# Patient Record
Sex: MALE | Race: WHITE
[De-identification: names, ages, dates, MRNs, and addresses within clinical notes are randomized per-mention and may not be internally consistent; named-entity substitution may affect disease eponyms.]

---

## 2017-02-15 ENCOUNTER — HOSPITAL ENCOUNTER (INPATIENT)
Dept: HOSPITAL 53 - M ED | Age: 77
LOS: 18 days | Discharge: HOME HEALTH | DRG: 25 | End: 2017-03-05
Attending: INTERNAL MEDICINE | Admitting: INTERNAL MEDICINE
Payer: MEDICARE

## 2017-02-15 VITALS — SYSTOLIC BLOOD PRESSURE: 121 MMHG | DIASTOLIC BLOOD PRESSURE: 66 MMHG

## 2017-02-15 VITALS — DIASTOLIC BLOOD PRESSURE: 65 MMHG | SYSTOLIC BLOOD PRESSURE: 133 MMHG

## 2017-02-15 VITALS — HEIGHT: 70 IN | BODY MASS INDEX: 26.29 KG/M2 | WEIGHT: 183.65 LBS

## 2017-02-15 VITALS — DIASTOLIC BLOOD PRESSURE: 84 MMHG | SYSTOLIC BLOOD PRESSURE: 175 MMHG

## 2017-02-15 DIAGNOSIS — I95.9: ICD-10-CM

## 2017-02-15 DIAGNOSIS — R26.9: ICD-10-CM

## 2017-02-15 DIAGNOSIS — R32: ICD-10-CM

## 2017-02-15 DIAGNOSIS — Z79.84: ICD-10-CM

## 2017-02-15 DIAGNOSIS — F02.80: ICD-10-CM

## 2017-02-15 DIAGNOSIS — E55.9: ICD-10-CM

## 2017-02-15 DIAGNOSIS — M47.892: ICD-10-CM

## 2017-02-15 DIAGNOSIS — E86.0: ICD-10-CM

## 2017-02-15 DIAGNOSIS — E78.00: ICD-10-CM

## 2017-02-15 DIAGNOSIS — Z79.899: ICD-10-CM

## 2017-02-15 DIAGNOSIS — E11.42: ICD-10-CM

## 2017-02-15 DIAGNOSIS — N17.9: ICD-10-CM

## 2017-02-15 DIAGNOSIS — Z87.891: ICD-10-CM

## 2017-02-15 DIAGNOSIS — G93.5: ICD-10-CM

## 2017-02-15 DIAGNOSIS — G30.9: ICD-10-CM

## 2017-02-15 DIAGNOSIS — I10: ICD-10-CM

## 2017-02-15 DIAGNOSIS — E78.5: ICD-10-CM

## 2017-02-15 DIAGNOSIS — G93.0: Primary | ICD-10-CM

## 2017-02-15 DIAGNOSIS — M47.896: ICD-10-CM

## 2017-02-15 LAB
ALBUMIN SERPL BCG-MCNC: 3.8 GM/DL (ref 3.2–5.2)
ALBUMIN/GLOB SERPL: 1.03 {RATIO} (ref 1–1.93)
ALP SERPL-CCNC: 84 U/L (ref 45–117)
ALT SERPL W P-5'-P-CCNC: 30 U/L (ref 12–78)
ANION GAP SERPL CALC-SCNC: 6 MEQ/L (ref 8–16)
AST SERPL-CCNC: 23 U/L (ref 15–37)
BASOPHILS # BLD AUTO: 0 K/MM3 (ref 0–0.2)
BASOPHILS NFR BLD AUTO: 0.4 % (ref 0–1)
BILIRUB CONJ SERPL-MCNC: 0.1 MG/DL (ref 0–0.2)
BILIRUB SERPL-MCNC: 0.7 MG/DL (ref 0.2–1)
BUN SERPL-MCNC: 50 MG/DL (ref 7–18)
CALCIUM SERPL-MCNC: 9 MG/DL (ref 8.8–10.2)
CHLORIDE SERPL-SCNC: 101 MEQ/L (ref 98–107)
CO2 SERPL-SCNC: 33 MEQ/L (ref 21–32)
CREAT SERPL-MCNC: 1.58 MG/DL (ref 0.7–1.3)
EOSINOPHIL # BLD AUTO: 0.2 K/MM3 (ref 0–0.5)
EOSINOPHIL NFR BLD AUTO: 2.7 % (ref 0–3)
ERYTHROCYTE [DISTWIDTH] IN BLOOD BY AUTOMATED COUNT: 12.6 % (ref 11.5–14.5)
EST. AVERAGE GLUCOSE BLD GHB EST-MCNC: 134 MG/DL (ref 60–110)
GFR SERPL CREATININE-BSD FRML MDRD: 45.7 ML/MIN/{1.73_M2} (ref 42–?)
GLUCOSE SERPL-MCNC: 120 MG/DL (ref 83–110)
INR PPP: 0.95
LARGE UNSTAINED CELL #: 0.1 K/MM3 (ref 0–0.4)
LARGE UNSTAINED CELL %: 1 % (ref 0–4)
LYMPHOCYTES # BLD AUTO: 1.4 K/MM3 (ref 1.5–4.5)
LYMPHOCYTES NFR BLD AUTO: 18.3 % (ref 24–44)
MCH RBC QN AUTO: 28.9 PG (ref 27–33)
MCHC RBC AUTO-ENTMCNC: 33.6 G/DL (ref 32–36.5)
MCV RBC AUTO: 85.9 FL (ref 80–96)
MONOCYTES # BLD AUTO: 0.4 K/MM3 (ref 0–0.8)
MONOCYTES NFR BLD AUTO: 5.7 % (ref 0–5)
NEUTROPHILS # BLD AUTO: 5.2 K/MM3 (ref 1.8–7.7)
NEUTROPHILS NFR BLD AUTO: 72 % (ref 36–66)
PLATELET # BLD AUTO: 232 K/MM3 (ref 150–450)
POTASSIUM SERPL-SCNC: 4.4 MEQ/L (ref 3.5–5.1)
PROT SERPL-MCNC: 7.5 GM/DL (ref 6.4–8.2)
SODIUM SERPL-SCNC: 140 MEQ/L (ref 136–145)
T4 FREE SERPL-MCNC: 1.04 NG/DL (ref 0.76–1.46)
WBC # BLD AUTO: 7.2 K/MM3 (ref 4–10)

## 2017-02-15 RX ADMIN — SODIUM CHLORIDE SCH MLS/HR: 9 INJECTION, SOLUTION INTRAVENOUS at 18:20

## 2017-02-15 RX ADMIN — INSULIN LISPRO SCH UNITS: 100 INJECTION, SOLUTION INTRAVENOUS; SUBCUTANEOUS at 17:30

## 2017-02-15 RX ADMIN — DOCUSATE SODIUM SCH MG: 100 CAPSULE, LIQUID FILLED ORAL at 21:04

## 2017-02-15 RX ADMIN — SIMVASTATIN SCH MG: 20 TABLET, FILM COATED ORAL at 21:04

## 2017-02-15 RX ADMIN — INSULIN LISPRO SCH UNITS: 100 INJECTION, SOLUTION INTRAVENOUS; SUBCUTANEOUS at 20:51

## 2017-02-15 NOTE — HPE
DATE OF ADMISSION:  02/15/2017

 

PRIMARY CARE PROVIDER:  Dr. Ryan Gleason MD

 

HISTORY OF PRESENT ILLNESS:  This patient is a 76-year-old male with a past

medical history significant for hypertension, hypercholesterolemia, diabetes,

dementia, presented to [facility] on 02/15/2017, after a fall.

Patient was visiting his wife in [facility] and patient started

feeling weak and fell on the floor and patient was brought into [facility] for further evaluation.  Information was also obtained from the two

daughters present during encounter.  Patient started having multiple falls in the

past month, at least three witnessed falls so far, and on 02/08/2017, patient

fell and landed on the right ribs and resulted in rib fractures.  Patient does

not remember any specific triggers for the fall.  He said that he just started to

have sudden weakness.  Denied any lightheadedness.  Denies any loss of

consciousness.  Denies any numbness or tingling.  Per family members, patient

does not have a diagnosis of dementia, however they have noticed patient

demonstrating some slowing of memory issues.  Patient lives with older daughter.

 

When patient arrived in the emergency room, patient had a blood pressure of

91/50.  Patient also was found to have a heart rate of 49.  Gentle intravenous

(IV) fluids given which temporarily increased the blood pressure.  Atropine 0.5

mg IV times one was given, however patient's heart rate maintained around 50s and

hospitalist team was called for admission.

 

ALLERGIES:  No known drug allergies.

 

HOME MEDICATIONS:

- metformin 500 mg by mouth twice a day

- lisinopril 10 mg by mouth daily

- hydrochlorothiazide 6.25 mg daily

- simvastatin 20 mg by mouth daily

- Namenda 28 mg by mouth daily

 

PAST MEDICAL HISTORY:

1.  Hypertension.

2.  Hypercholesterolemia.

3.  Dementia.

4.  Diabetes.

 

PAST SURGICAL HISTORY:  Right knee surgery.

 

SOCIAL HISTORY:  Patient smoked one pack daily for approximately 25 years, quit

11 years ago.  Drinks 1-2 beers every week.  Denies any recreational drug use.

Patient is a [employer].

 

REVIEW OF SYSTEMS:

GENERAL:  No fever, no chills.

HEENT:  No vision changes, no auditory changes.

CARDIOVASCULAR:  No chest pain, no palpitations.

RESPIRATORY:  No shortness of breath, no cough, no sputum production.

GASTROINTESTINAL (GI):  No abdominal pain, no nausea, no vomiting, no diarrhea.

GENITOURINARY ():  Denies any dysuria or hematuria.

 

OBJECTIVE:

VITAL SIGNS:  Blood pressure 95/51, pulse 48, respirations 16, temperature 97.4,

pulse oximetry 97% in room air.  Body weight is 83.91 kg, body height 175.26 cm.

GENERAL:  Fatigued, alert and awake, oriented to name, place, and the president,

however patient demonstrates some signs of difficulty with long-term memory.

HEENT:  Normocephalic, atraumatic.  Extraocular motors grossly intact.

CARDIOVASCULAR:  Distant heart sounds, positive S1, S2, but bradycardic with

heart rate wandering around 50s.

RESPIRATORY:  Clear to auscultation bilaterally.  No wheezes or rhonchi.

ABDOMEN:  Soft, nontender, nondistended.  Bowel sounds present.  No rebound, no

guarding.

EXTREMITIES:  No edema.  No sign of cyanosis.

NEUROLOGICAL:  Sensation to fine touch grossly intact.  Muscle strength 5/5.

 

LABORATORY DATA:

WBC 7.2, hemoglobin 14, hematocrit 41.5, platelet count 232.

 

Sodium 140, potassium 4.4, chloride 101, carbon dioxide 33, BUN 50, creatinine

1.58, GFR 45.7, fasting glucose 120, calcium 9, total bilirubin 0.7, direct

bilirubin 0.1, AST 23, ALT 30, alkaline phosphatase 83, total , troponin I

less than 0.02, total protein 7.5, albumin 3.8, TSH 0.315.  PT 0.8, INR 0.95.

 

CT of the head without contrast shows small vessel ischemic disease.  Mild volume

loss.  4.1 cm cystic lesion in the right frontal and parietal lobe.

 

ASSESSMENT AND PLAN:

1.  Frequent falls.  Patient will be admitted to the progressive care unit (PCU)

under observation status.  Patient does have bradycardia with a heart rate

wandering around 50s.  We will obtain outpatient records to see patient's

baseline heart rate.  We will also try to rule out any possible cardiac

arrhythmia.  Follow with orthostatic vital signs measurements.  Follow with

physical therapy evaluation and treatment.

 

2.  Acute kidney injury.  We do not know if patient has baseline chronic kidney

disease.  At this moment, patient has a BUN of 50, creatinine 1.58.  Patient was

started on gentle hydration which will also help with patient's hypotension.

 

3.  Hypotension.  Responds briefly with 0.5 liters bolus times two.  Currently

patient will be on continuous gentle hydration.

 

4.  Cyst lesion in the right frontal and parietal lobe.  The case has been

discussed with Dr. Patel.  Patient will have an MRI of the brain without

contrast followed by with contrast.

 

5.  Diabetes.  Follow with A1c.  Continue with consistent carbohydrate diet.

Continue with insulin sliding scale.

 

6.  History of hypercholesterolemia.  Continue statin.

 

7.  History of hypertension.  Currently patient is hypotensive.  Will hold the

blood pressure medications.

 

8.  Dementia.  Continue Namenda.

 

9.  Elevated thyroid stimulating hormone (TSH).  Will follow with free T4.

Patient does not have a history of thyroid disease.

 

10.  Deep venous thrombosis (DVT) prophylaxis.  Due to frequent falls, currently

patient will be on thromboembolism deterrents (TEDs) and sequential compression

device.

## 2017-02-15 NOTE — ECGEPIP
Stationary ECG Study

                           [facility] - ED

                                       

                                       Test Date:    2017-02-15

Pat Name:     CHET OSWALD          Department:   

Patient ID:   [id number]                 Room:         -

Gender:       M                        Technician:   THEE

:          1940               Requested By: MARIUSZ SCOTT

Order Number: [id number]     Reading MD:   Geronimo Sue

                                 Measurements

Intervals                              Axis          

Rate:         46                       P:            35

MI:           189                      QRS:          1

QRSD:         105                      T:            15

QT:           431                                    

QTc:          381                                    

                           Interpretive Statements

SINUS BRADYCARDIA

MODERATE VOLTAGE CRITERIA FOR LVH, CONSIDER NORMAL VARIANT

ANTEROLATERAL ST ELEVATION, INFERIOR ST DEPRESSION CONSIDER ACUTE INJURY

Electronically Signed On 2- 12:36:52 EST by Geronimo Sue

## 2017-02-15 NOTE — EDDOCDS
Nurse's Notes                                                                                     

[facility]                                                                         

Name: Chet Mayfield                                                                             

Age: 76 yrs                                                                                       

Sex: Male                                                                                         

: 1940                                                                                   

MRN: [id number]                                                                                     

Arrival Date: 02/15/2017                                                                          

Time: 11:33                                                                                       

Account#: [id number]                                                                              

Bed Admit Hold                                                                                    

Private MD: Ryan Gleason M.D.                                                                    

Diagnosis: Gastrointestinal hemorrhage, unspecified;Hypotension;Bradycardia, unspecified;Abrasion,

left knee;Abnormal brain scan-likely arachnoid cyst                                             

                                                                                                  

Presentation:                                                                                     

02/15                                                                                             

11:40 Presenting complaint: EMS states: states was up visiting wife at [facility], became disorient  ml6 

      and fell x 3. Adult Sepsis Screening: Patient has new or worsening altered mentation (1     

      point). Patient's respiratory rate is less than 22. Systolic blood pressure is less         

      than or equal to 100 (1 point). Patient has a qSOFA score of 2. No known or suspected       

      infection- Negative Sepsis Screen. Suicide/Homicide risk assessment- the patient denies     

      having any suicidal and/or homicidal ideations and does not present with any other          

      emotional, behavioral or mental health complaints.  Status: Patient is not a        

       or  dependent. Transition of care: patient was not received from     

      another setting of care.                                                                    

11:40 Acuity: NILESH Level 2                                                                     ml6 

11:40 Method Of Arrival: Ambulance                                                            ml6 

                                                                                                  

Triage Assessment:                                                                                

11:40 General: Appears in no apparent distress, Behavior is appropriate for age, cooperative. ml6 

      Pain: Denies pain. Neurological: No deficits noted. Level of Consciousness is awake,        

      alert, Oriented to person, place, time. Cardiovascular: No deficits noted. Capillary        

      refill < 3 seconds is brisk in bilateral fingers toes. Respiratory: No deficits noted.      

      GI: No deficits noted. Musculoskeletal: Circulation, motion, and sensation intact           

      Capillary refill < 3 seconds is brisk in bilateral fingers toes Range of motion intact      

      in all extremities. No deformity noted Swelling absent Signs and Symptoms of                

      Compartment Syndrome: no signs of compartment syndrome.                                     

                                                                                                  

Historical:                                                                                       

- Allergies: no known allergies;                                                                  

- Home Meds:                                                                                      

1. metformin 500 mg Oral tab 1 tab 2 times per day                                              

     (Last dose: Unknown)                                                                         

2. lisinopril 10 mg Oral tab 1 tab once daily                                                   

     (Last dose: Unknown)                                                                         

3. hydrochlorothiazide 12.5 mg Oral cap 6.25 mg once daily                                      

     (Last dose: Unknown)                                                                         

4. simvastatin 20 mg Oral tab 1 tab once daily                                                  

     (Last dose: Unknown)                                                                         

5. Namenda XR 28 mg oral CSpX 1 cap once daily                                                  

     (Last dose: Unknown)                                                                         

- PMHx: Hypertension; Hypercholesterolemia; Dementia;                                             

- PSHx: Knee surgery- Right;                                                                      

- Social history: Smoking status: Patient states former smoker of tobacco. No barriers            

to communication noted.                                                                         

- : The pt / caregiver states he / she is not on anticoagulants. Home medication list             

is obtained from patients' pharmacy.                                                            

- Exposure Risk Screening:: None identified.                                                      

                                                                                                  

                                                                                                  

Screenin:47 Screening information is obtained from the patient. Fall risk: At risk due to apparent  ml6 

      cognitive impairment, prior history of falls. Assistance ADL's: requires no assistance      

      with activities of daily living. Abuse/DV Screen: The patient / caregiver reports           

      he/she is: not in a situation that causes fear, pain or injury. Nutritional screening:      

      No deficits noted. Advance Directives: Currently, there is no health care proxy. home       

      support is adequate.                                                                        

                                                                                                  

Assessment:                                                                                       

11:40 General: Appears in no apparent distress, see triage assessment.                        ml6 

12:40 General: Appears in no apparent distress, comfortable, Behavior is appropriate for age, ml6 

      cooperative. Pain: Denies pain. Neurological: Level of Consciousness is awake, alert,       

      Oriented to person, place, time,  are equal bilaterally Moves all extremities.         

      Full function Gait is steady, Speech with expressive aphasia noted, Facial symmetry         

      appears normal, Pupils are PERRLA, per daughter patient normally has expressive aphasia     

      .                                                                                           

13:40 Reassessment: Patient appears in no apparent distress at this time. Patient denies pain ml6 

      at this time. Patient states feeling better. Patient states symptoms have improved.         

      patient states that fatigue and weakness have improved after IVF.                           

14:40 Reassessment: Patient appears in no apparent distress at this time. Patient denies pain ml6 

      at this time. Patient states feeling better. Patient states symptoms have improved. no      

      change from previous assessment.                                                            

15:40 General: Appears in no apparent distress, comfortable, Behavior is appropriate for age, ml6 

      cooperative. Pain: Denies pain. Neurological: No deficits noted. Level of Consciousness     

      is awake, alert, Oriented to person, place, time,  are equal bilaterally Moves all     

      extremities. Full function Gait is steady, Speech with expressive aphasia noted.            

      Cardiovascular: No deficits noted. Capillary refill < 3 seconds is brisk in bilateral       

      fingers toes. Respiratory: No deficits noted. GI: No deficits noted.                        

16:29 General: patient transported to MRI.                                                    ml6 

                                                                                                  

Vital Signs:                                                                                      

11:45 BP 92 / 53 (auto/);                                                                     ml6 

11:45 Pulse 54 MON; Resp 16; Temp 97.4(O); Pulse Ox 98% on R/A; Weight 83.91 kg (R); Height 5 ml6 

      ft. 9 in. (175.26 cm) (R); Pain 0/10;                                                       

12:01 BP 91 / 50 (auto/);                                                                     ml6 

12:01 Pulse 50 MON; Resp 16; Pulse Ox 99% on R/A;                                             ml6 

12:16 BP 99 / 73 (auto/);                                                                     ml6 

12:16 Pulse 57 MON; Resp 16; Pulse Ox 97% on R/A;                                             ml6 

12:23 BP 92 / 54 (auto/);                                                                     ml6 

12:23 Pulse 49 MON; Resp 16; Pulse Ox 98% on R/A; Pain 0/10;                                  ml6 

12:28  / 68 (auto/);                                                                    ml6 

12:28 Pulse 51; Resp 16; Pulse Ox 94% on R/A;                                                 ml6 

12:33  / 57 (auto/);                                                                    ml6 

12:33 Pulse 50; Resp 16; Pulse Ox 98% on R/A;                                                 ml6 

12:38 BP 95 / 51 (auto/);                                                                     ml6 

12:38 Pulse 48; Resp 16; Pulse Ox 97% on R/A;                                                 ml6 

12:58 BP 85 / 53 (auto/);                                                                     ml6 

12:58 Pulse 51 MON; Resp 16; Pulse Ox 97% on R/A;                                             ml6 

13:03 BP 85 / 52 (auto/);                                                                     ml6 

13:03 Pulse 50 MON; Resp 16; Pulse Ox 96% on R/A;                                             ml6 

13:08 BP 88 / 53 (auto/);                                                                     ml6 

13:08 Pulse 50 MON; Resp 16; Pulse Ox 98% on R/A;                                             ml6 

13:13 BP 86 / 52 (auto/);                                                                     ml6 

13:13 Pulse 50 MON; Resp 16; Pulse Ox 98% on R/A; Pain 0/10;                                  ml6 

13:18 BP 74 / 45 (auto/);                                                                     ml6 

13:18 Pulse 52 MON; Resp 16; Pulse Ox 98% on R/A; Pain 0/10;                                  ml6 

13:23 BP 73 / 43 (auto/);                                                                     ml6 

13:23 Pulse 50 MON; Resp 16; Pulse Ox 97% on R/A;                                             ml6 

13:28 BP 73 / 40 (auto/);                                                                     ml6 

13:28 Pulse 50 MON; Resp 16; Pulse Ox 94% on R/A; Pain 0/10;                                  ml6 

13:38 BP 81 / 45 (auto/);                                                                     ml6 

13:38 Pulse 52 MON; Resp 16; Pulse Ox 97% on R/A;                                             ml6 

13:48  / 57 (auto/);                                                                    ml6 

13:48 Pulse 61 MON; Resp 16; Pulse Ox 98% on R/A; Pain 0/10;                                  ml6 

14:30  / 62 (auto/);                                                                    ml6 

16:29  / 65 (auto/);                                                                    sls1

16:31 Pulse 60 MON; Pulse Ox 97% ;                                                            sls1

20:28  / 56 (auto/);                                                                    sls1

20:30 Pulse 52 MON; Pulse Ox 93% ;                                                            sls1

11:45 Body Mass Index 27.32 (83.91 kg, 175.26 cm)                                             ml6 

                                                                                                  

Vitals:                                                                                           

11:51 Log In Time N/A - ambulance arrival.                                                    ml6 

                                                                                                  

ED Course:                                                                                        

11:34 Patient visited by Veronica Piedra, Unit Clerk.                                             lbd 

11:34 Patient moved to Waiting                                                                lbd 

11:35 Ryan Gleason is Private Physician.                                                      lbd 

11:35 Patient moved to 3                                                                      lbd 

11:41 Triage Initiated                                                                        ml6 

11:47 The patient / caregiver is instructed regarding the plan of care and ED course. Cardiac ml6 

      monitor on. Pulse ox on. NIBP on.                                                           

11:47 Inserted peripheral IV: 16gauge IV in left antecubital area and blood collected.        ml6 

      Patient tolerated the procedure well. Inserted peripheral IV: 18gauge IV in right           

      antecubital area and blood collected. Patient tolerated the procedure well. No              

      procedures done that require assistance. Labs drawn. (by ED staff). Sent per order to       

      lab.                                                                                        

11:49 Patient visited by Archie Duron.                                                       jml1

11:49 EKG done. (by ED staff). Reviewed by Antelmo Robbins MD.                                   jml1

12:04 Antelmo Robbins MD is Attending Physician.                                               br1 

12:17 Patient visited by Antelmo Robbins MD.                                                   br1 

12:47 EKG-ADULT Returned.                                                                     EDMS

12:49 Patient name changed from Chet\S\\S\Mayfield\S\ to Chet\S\T\S\Mayfield.                    
   EDMS

12:51 Patient visited by Lam Gallegos RN.                                                   ml6 

12:51 [facility] Payment Agreement was scanned into [employer] and attached to record.               lg  

12:51 Chest, 1 View Returned.                                                                 EDMS

13:14 Patient visited by Lam Gallegos RN.                                                   ml6 

13:34 CT Head Without Contrast Returned.                                                      EDMS

13:45 Patient visited by Lam Gallegos RN.                                                   ml6 

13:47 Patient moved to CT                                                                     ml6 

13:48 Patient moved to 3                                                                      ek2 

14:05 Tierney Barrientos .                                                                 ys2 

14:06 Patient visited by Antelmo Robbins MD.                                                   br1 

14:12 Tierney Barrientos is Hospitalizing Provider.                                                     br1 

15:12 T-Sheet-- Draft Copy was scanned into [employer] and attached to record.                   gb  

15:48 Knee, Complete Returned.                                                                EDMS

16:28 Patient moved to 18                                                                     ml6 

19:06 CT Head Without Contrast Returned.                                                      EDMS

19:07 MRI Brain W/O FOLL BY WITH Returned.                                                    EDMS

19:34 Patient moved to Admit Hold                                                             sls1

                                                                                                  

Administered Medications:                                                                         

12:19 Drug: NS 0.9% 500 ml [sodium chloride 0.9 % intravenous solution] Route: IV; Rate:      ml6 

      bolus; Site: right forearm;                                                                 

13:15 Follow up: IV Status: Completed infusion; Infusion discontinued; IV Intake: 500ml       ml6 

12:27 Drug: Atropine 0.5 mg [atropine 0.1 mg/mL injection syringe (5 mL)] Route: IVP; Site:   ml6 

      right antecubital;                                                                          

13:42 Drug: NS 0.9% 500 ml [sodium chloride 0.9 % intravenous solution] Route: IV; Rate:      ml6 

      bolus; Site: right antecubital;                                                             

16:35 Follow up: IV Status: Completed infusion; Infusion discontinued; IV Intake: 500ml       ml6 

                                                                                                  

                                                                                                  

Intake:                                                                                           

13:15 IV: 500.00ml; Total: 500.00ml.                                                          ml6 

16:35 IV: 500.00ml; Total: 1000.00ml.                                                         ml6 

                                                                                                  

Order Results:                                                                                    

Lab Order: CBC with Diff; SPEC'M 02/15/17 11:46                                                   

      Test: WHITE BLOOD COUNT; Value: 7.2; Range: 4.0-10.0; Units: K/mm3; Status: F               

      Test: RED BLOOD COUNT; Value: 4.83; Range: 4.30-6.10; Units: M/mm3; Status: F               

      Test: HEMOGLOBIN; Value: 14.0; Range: 14.0-18.0; Units: g/dl; Status: F                     

      Test: HEMATOCRIT; Value: 41.5; Range: 42.0-52.0; Abnormal: Below low normal; Units: %;      

      Status: F                                                                                   

      Test: MEAN CORPUSCULAR VOLUME; Value: 85.9; Range: 80.0-96.0; Units: fl; Status: F          

      Test: MEAN CORPUSCULAR HEMOGLOBIN; Value: 28.9; Range: 27.0-33.0; Units: pg; Status: F      

      Test: MEAN CORPUSCULAR HGB CONC; Value: 33.6; Range: 32.0-36.5; Units: g/dl; Status: F      

      Test: RED CELL DISTRIBUTION WIDTH; Value: 12.6; Range: 11.5-14.5; Units: %; Status: F       

      Test: PLATELET COUNT, AUTOMATED; Value: 232; Range: 150-450; Units: k/mm3; Status: F        

      Test: NEUTROPHILS %; Value: 72.0; Range: 36.0-66.0; Abnormal: Above high normal; Units:     

      %; Status: F                                                                                

      Test: LYMPH %; Value: 18.3; Range: 24.0-44.0; Abnormal: Below low normal; Units: %;         

      Status: F                                                                                   

      Test: MONO %; Value: 5.7; Range: 0.0-5.0; Abnormal: Above high normal; Units: %;            

      Status: F                                                                                   

      Test: EOS %; Value: 2.7; Range: 0.0-3.0; Units: %; Status: F                                

      Test: BASO %; Value: 0.4; Range: 0.0-1.0; Units: %; Status: F                               

      Test: LARGE UNSTAINED CELL %; Value: 1.0; Range: 0.0-4.0; Units: %; Status: F               

      Test: NEUTROPHILS #; Value: 5.2; Range: 1.8-7.7; Units: K/mm3; Status: F                    

      Test: LYMPH #; Value: 1.4; Range: 1.5-4.5; Abnormal: Below low normal; Units: K/mm3;        

      Status: F                                                                                   

      Test: MONO #; Value: 0.4; Range: 0.0-0.8; Units: K/mm3; Status: F                           

      Test: EOS #; Value: 0.2; Range: 0.0-0.50; Units: K/mm3; Status: F                           

      Test: BASO #; Value: 0.0; Range: 0.0-0.2; Units: K/mm3; Status: F                           

      Test: LARGE UNSTAINED CELL #; Value: 0.1; Range: 0.0-0.4; Units: K/mm3; Status: F           

Lab Order: Cardiac Injury Profile; SPEC'M 02/15/17 11:46                                          

      Test: CPK CREATINE PHOSPHOKINASE; Value: 208; Range: ; Units: U/L; Status: F          

      Test: CK-MB VALUE MASS; Value: 4.3; Range: 0.0-3.6; Abnormal: Above high normal; Units:     

      NG/ML; Status: F                                                                            

      Test: MB/CK RELATIVE INDEX; Value: 2.06; Range: < OR =4; Status: F                          

      Test Note: &nbsp;; DIAGNOSIS CRITERIA MMB ng/ml Relative Index (RI) NON-AMI < or = 5      

      N/A GRAY ZONE > 5 < or = 4 AMI > 5 > 4                                                      

Lab Order: Liver Profile; SPEC'M 02/15/17 11:46                                                   

      Test: AST/SGOT; Value: 23; Range: 15-37; Units: U/L; Status: F                              

      Test: ALT/SGPT; Value: 30; Range: 12-78; Units: U/L; Status: F                              

      Test: ALKALINE PHOSPHATASE; Value: 84; Range: ; Units: U/L; Status: F                 

      Test: BILIRUBIN,TOTAL; Value: 0.7; Range: 0.2-1.0; Units: MG/DL; Status: F                  

      Test: BILIRUBIN,DIRECT; Value: 0.1; Range: 0.0-0.2; Units: MG/DL; Status: F                 

      Test: TOTAL PROTEIN; Value: 7.5; Range: 6.4-8.2; Units: GM/DL; Status: F                    

      Test: ALBUMIN; Value: 3.8; Range: 3.2-5.2; Units: GM/DL; Status: F                          

      Test: ALBUMIN/GLOBULIN RATIO; Value: 1.03; Range: 1.00-1.93; Status: F                      

Lab Order: MED Profile; SPEC'M 02/15/17 11:46                                                     

      Test: GLUCOSE, FASTING; Value: 120; Range: ; Abnormal: Above high normal; Units:      

      MG/DL; Status: F                                                                            

      Test: BLOOD UREA NITROGEN; Value: 50; Range: 7-18; Abnormal: Above high normal; Units:      

      MG/DL; Status: F                                                                            

      Test: CREATININE FOR GFR; Value: 1.58; Range: 0.70-1.30; Abnormal: Above high normal;       

      Units: MG/DL; Status: F                                                                     

      Test: GLOMERULAR FILTRATION RATE; Value: 45.7; Range: >42; Status: F                        

      Test: SODIUM LEVEL; Value: 140; Range: 136-145; Units: MEQ/L; Status: F                     

      Test: POTASSIUM SERUM; Value: 4.4; Range: 3.5-5.1; Units: MEQ/L; Status: F                  

      Test: CHLORIDE LEVEL; Value: 101; Range: ; Units: MEQ/L; Status: F                    

      Test: CARBON DIOXIDE LEVEL; Value: 33; Range: 21-32; Abnormal: Above high normal;           

      Units: MEQ/L; Status: F                                                                     

      Test: ANION GAP; Value: 6; Range: 8-16; Abnormal: Below low normal; Units: MEQ/L;           

      Status: F                                                                                   

      Test: CALCIUM LEVEL; Value: 9.0; Range: 8.8-10.2; Units: MG/DL; Status: F                   

      Test Note: &nbsp;; Units are mL/min/1.73 m2 Chronic Kidney Disease Staging per NKF:       

      Stage I & II GFR >=60 Normal to Mildly Decreased Stage III GFR 30-59 Moderately           

      Decreased Stage IV GFR 15-29 Severely Decreased Stage V GFR <15 Very Little GFR Left        

      ESRD GFR <15 on RRT                                                                         

Lab Order: Thyroid Stimulating Hormone; SPEC'M 02/15/17 11:46                                     

      Test: THYROID STIMULATING HORMONE; Value: 0.315; Range: 0.358-3.740; Abnormal: Below        

      low normal; Units: uIU/ML; Status: F                                                        

Lab Order: Troponin; SPEC'M 02/15/17 11:46                                                        

      Test: TROPONIN I; Value: < 0.02; Range: < 0.10; Units: NG/ML; Status: F                     

      Test Note: &nbsp;; Troponin I Reference Interval for [employer] [place] LOCI: 99th             

      Percentile= 0.00-0.045 ng/ml Risk Stratification: <= 0.10 ng/ml Decreased Risk for          

      Adverse Clinical Events. 0.10-1.50 ng/ml Increased Risk for Adverse Clinical Events.        

      Evaluation of additional criterion and/or repeat testing in 2-6 hours is suggested to       

      rule out myocardial damage. >= 1.50 ng/ml Indicative of Myocardial Injury.                  

Lab Order: Type & Screen; SPEC'M 02/15/17 11:46                                                 

      Test: BLOOD TYPE; Value: O POS; Status: F                                                   

      Test: AB SCREEN (INDIRECT SHAD)VIS; Value: NEGATIVE; Status: F                            

Lab Order: PT/INR; SPEC'M 02/15/17 11:46                                                          

      Test: PROTHROMBIN TIME; Value: 12.8; Range: 12.3-14.5; Units: SECONDS; Status: F            

      Test: INR; Value: 0.95; Status: F                                                           

      Test Note: &nbsp;; THERAPUTIC HUMAN INR VALUES INDICATIONS NORMAL RANGES                  

      PROPHYLAXIS/TREATMENT OF: VENOUS THROMBOSIS 2.0-3.0 PULMONARY EMBOLISM 2.0-3.0              

      PREVENTION OF SYSTEMIC EMBOLISM FROM: TISSUE HEART VALVES 2.0-3.0 ACUTE MYOCARDIAL          

      INFARCTION 2.0-3.0 VALVULAR HEART DISEASE 2.0-3.0 ATRIAL FIBRILLATION 2.0-3.0               

      MECHANICAL VALVES(HIGH RISK) 2.5-3.5 RECURRENT MYOCARDIAL INFARCTION 2.5-3.5                

Lab Order: PTT; SPEC 02/15/17 11:46                                                             

      Test: PARTIAL THROMBOPLASTIN TIME; Value: 27.6; Range: 26.6-37.1; Units: SECONDS;           

      Status: F                                                                                   

Lab Order: FREE T4; SPEC 02/15/17 11:46                                                         

      Test: FREE T4; Value: 1.04; Range: 0.76-1.46; Units: NG/DL; Status: F                       

Lab Order: HEMOGLOBIN A1C; SPEC 02/15/17 11:46                                                  

      Test: HEMOGLOBIN A1c; Value: 6.3; Range: 4.5-6.2; Abnormal: Above high normal; Units:       

      %; Status: F                                                                                

      Test: ESTIMATED AVERAGE GLUCOSE; Value: 134; Range: ; Abnormal: Above high            

      normal; Units: MG/DL; Status: F                                                             

Lab Order: Fingerstick Blood Sugar; SPEC'M 02/15/17 18:07                                         

      Test: BEDSIDE GLUCOSE; Value: 97; Range: ; Units: MG/DL; Status: F                    

Lab Order: Fingerstick Blood Sugar; SPEC'M 02/15/17 20:27                                         

      Test: BEDSIDE GLUCOSE; Value: 141; Range: ; Abnormal: Above high normal; Units:       

      MG/DL; Status: F                                                                            

                                                                                                  

Radiology Order: EKG-ADULT                                                                        

      Test: EKG-ADULT                                                                             

      REASON FOR EXAMINATION: hypotension; Stationary ECG Study; [facility] - ED; ;       

      Test Date: 2017-02-15; Pat Name: CHET MAYFIELD Department:; Patient ID: [id number]          

      Room: -; Gender: M Technician: JT; : 1940 Requested By: ANTELMO SCOTT; Order     

      Number: [id number] Reading MD: Geronimo Sue; Measurements; Intervals Axis;        

      Rate: 46 P: 35; MO: 189 QRS: 1; QRSD: 105 T: 15; QT: 431; QTc: 381; Interpretive            

      Statements; SINUS BRADYCARDIA; MODERATE VOLTAGE CRITERIA FOR LVH, CONSIDER NORMAL           

      VARIANT; ANTEROLATERAL ST ELEVATION, INFERIOR ST DEPRESSION CONSIDER ACUTE INJURY;          

      Electronically Signed On 2- 12:36:52 EST by Geronimo Sue;                            

Radiology Order: Chest, 1 View                                                                    

      Test: Chest, 1 View                                                                         

      REASON FOR EXAMINATION: CVA <4.5hrs; AP PORTABLE CHEST: 02/15/2017; ; Comparison: Right     

      ribs 2017.; ; Clinical history: CVA less than 4.5 hours.; ; Findings: Lungs are       

      well inflated. The CP angles sharply defined without; effusion, lateral pleural             

      thickening or apical scarring. There is no definite; infiltrate. Some minor basilar         

      fibrotic changes are seen. No gross; cardiomegaly, vascular redistribution or pulmonary     

      edema. The aorta is mildly; tortuous but normal for age. Airway intact. There are           

      degenerative changes in; the spine and shoulders.; ; Impression:; ; 1. Minor basilar        

      fibrotic change without dense consolidation or effusion,; cardiomegaly, edema or other      

      acute finding.; ; ; Signed by; Matthias Gallardo MD 02/15/2017 12:48 P;                      

Radiology Order: CT Head Without Contrast                                                         

      Test: CT Head Without Contrast                                                              

      REASON FOR EXAMINATION: CVA <4.5hrs; CT HEAD WITHOUT CONTRAST:; ; HISTORY: Infarction.;     

      ; Areas of decreased attenuation are present in the periventricular white matter.; This     

      represents small vessel ischemic disease. A 4.1 cm cystic lesion is present; in the         

      right frontal and parietal lobes. There is mass effect with partial; effacement of the      

      overlying cortical sulci. There is no intraparenchymal; hemorrhage or midline shift.        

      The ventricular system and cortical sulci as well; as subarachnoid space in the             

      posterior fossa are dilated consistent with mild; volume loss. There is no                  

      extracerebral collection. Mucosal thickening is; present in the ethmoid sinuses. A 3 mm     

      osteoma is present in the right ethmoid; sinus.; ; IMPRESSION:; ; 1. Small vessel           

      ischemic disease.; ; 2. Mild volume loss.; ; 3. There is a 4.1 cm cystic lesion in the      

      right frontal and parietal lobes.; This most likely represents an arachnoid cyst,           

      however, contrast enhanced CT or; MR may be helpful for further evaluation.; ; ; Signed     

      by; Angus Salcido MD 02/15/2017 06:38 P;                                                    

Radiology Order: Knee, Complete                                                                   

      Test: Knee, Complete                                                                        

      REASON FOR EXAMINATION: Trauma; LEFT KNEE SERIES, COMPLETE: 02/15/2017.; ; Clinical         

      history: Trauma. Patient fell with knee contusion and pain.; ; Findings: No prior           

      studies. [place] view shows narrowing of the patellofemoral; joint laterally and a few      

      millimeters of lateral subluxation of the marginal; osteophytes patellofemoral joint        

      calcifications and ossifications just above the; patella at its articular margin on the     

      lateral view were a fabella is also seen.; There are small ossific densities posterior      

      to the proximal tibiofibular; articulation and another along the posterior margin of        

      the tibial plafond on the; lateral view. Medial compartment shows marginal osteophytes      

      and tibial spine; spurs without joint space narrowing. The medial and lateral               

      compartments are; without narrowing.; ; Impression:; ; 1. Tricompartment osteoarthritis     

      with narrowing at the patellofemoral joint and; lateral patellar subluxation. This          

      suggests chondromalacia. No definite joint; effusion. Some marginal osteophytes and         

      small ossific densities adjacent to the; upper pole of the patella and posterior margin     

      of the tibial plateau suggesting; avulsions or osteochondral defects, likewise 3-4          

      small calcifications along the; posterior margin of the proximal tibiofibular joint. No     

      definite fractures.; ; ; Signed by; Matthias Gallardo MD 02/15/2017 03:30 P;                 

Radiology Order: MRI Brain W/O FOLL BY WITH                                                       

      Test: MRI Brain W/O FOLL BY WITH                                                            

      REASON FOR EXAMINATION: brain cystic lesion; MR BRAIN WITHOUT AND WITH CONTRAST:; ;         

      HISTORY: Cystic lesion.; ; CONTRAST: ProHance 8 mL.; ; COMPARISON: CT 02/15/2017; ;         

      Areas of increased signal intensity on T2 weighted images are present in the;               

      periventricular and subcortical white matter. This represents small vessel; ischemic        

      disease. There is no intraparenchymal hemorrhage, infarct or mass. The; ventricular         

      system and cortical sulci are dilated consistent with mild volume; loss. An arachnoid       

      cyst is present overlying the posterior right parietal lobe.; The arachnoid cyst            

      measure 4.4 cm in transverse x 5.3 cm in AP x 4.1 cm in; cephalocaudal dimensions.          

      There is mass effect on the adjacent right parietal; lobe without midline shift. There      

      is no abnormal enhancement . There is no; extracerebral collection. Mucosal thickening      

      is present in the maxillary and left; sphenoid sinuses.; ; IMPRESSION:; ; 1. Small          

      vessel ischemic disease.; ; 2. Mild volume loss.; ; 3. There is an arachnoid cyst           

      overlying the posterior right parietal lobe. There; is no midline shift.; ; ; Signed        

      by; Angus Salcido MD 02/15/2017 06:43 P;                                                    

Outcome:                                                                                          

14:13 Decision to Hospitalize by Provider.                                                    br1 

23:18 Patient left the ED.                                                                    sls1

                                                                                                  

Signatures:                                                                                       

Dispatcher [employer]                           EDMS                                                 

Veronica Piedra, Unit Clerk                 Unit lbd                                                  

Norma Cooley, Reg                  Reg  gb                                                   

Melisa Sommers, Reg                    Reg  lg                                                   

Antelmo Robbins MD MD   br1                                                  

Lam Gallegos RN                       RN   ml6                                                  

Josie Rashid RN                     RN   sls1                                                 

Archie Duronl1                                                 

Elliott Johnson                                ek2                                                  

Tierney Barrientos                                     ys2                                                  

                                                                                                  

**************************************************************************************************

MTDD

## 2017-02-15 NOTE — REP
LEFT KNEE SERIES, COMPLETE:  02/15/2017.

 

Clinical history:  Trauma.  Patient fell with knee contusion and pain.

 

Findings:  No prior studies. [place] view shows narrowing of the patellofemoral

joint laterally and a few millimeters of lateral subluxation of the marginal

osteophytes patellofemoral joint calcifications and ossifications just above the

patella at its articular margin on the lateral view were a fabella is also seen.

There are small ossific densities posterior to the proximal tibiofibular

articulation and another along the posterior margin of the tibial plafond on the

lateral view.  Medial compartment shows marginal osteophytes and tibial spine

spurs without joint space narrowing.  The medial and lateral compartments are

without narrowing.

 

Impression:

 

1.  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

lateral patellar subluxation.  This suggests chondromalacia.  No definite joint

effusion.  Some marginal osteophytes and small ossific densities adjacent to the

upper pole of the patella and posterior margin of the tibial plateau suggesting

avulsions or osteochondral defects, likewise 3-4 small calcifications along the

posterior margin of the proximal tibiofibular joint.  No definite fractures.

 

 

Signed by

Matthias Gallardo MD 02/15/2017 03:30 P

## 2017-02-15 NOTE — REP
AP PORTABLE CHEST:  02/15/2017

 

Comparison:  Right ribs 02/08/2017.

 

Clinical history:  CVA less than 4.5 hours.

 

Findings:  Lungs are well inflated.  The CP angles sharply defined without

effusion, lateral pleural thickening or apical scarring.  There is no definite

infiltrate.  Some minor basilar fibrotic changes are seen.  No gross

cardiomegaly, vascular redistribution or pulmonary edema.  The aorta is mildly

tortuous but normal for age.  Airway intact.  There are degenerative changes in

the spine and shoulders.

 

Impression:

 

1.  Minor basilar fibrotic change without dense consolidation or effusion,

cardiomegaly, edema or other acute finding.

 

 

Signed by

Matthias Gallardo MD 02/15/2017 12:48 P

## 2017-02-15 NOTE — REP
MR BRAIN WITHOUT AND WITH CONTRAST:

 

HISTORY: Cystic lesion.

 

CONTRAST: ProHance 8 mL.

 

COMPARISON: CT 02/15/2017

 

Areas of increased signal intensity on T2 weighted images are present in the

periventricular and subcortical white matter. This represents small vessel

ischemic disease. There is no intraparenchymal hemorrhage, infarct or mass. The

ventricular system and cortical sulci are dilated consistent with mild volume

loss. An arachnoid cyst is present overlying the posterior right parietal lobe.

The arachnoid cyst measure 4.4 cm in transverse x 5.3 cm in AP x 4.1 cm in

cephalocaudal dimensions. There is mass effect on the adjacent right parietal

lobe without midline shift. There is no abnormal enhancement . There is no

extracerebral collection. Mucosal thickening is present in the maxillary and left

sphenoid sinuses.

 

IMPRESSION:

 

1. Small vessel ischemic disease.

 

2. Mild volume loss.

 

3. There is an arachnoid cyst overlying the posterior right parietal lobe. There

is no midline shift.

 

 

Signed by

Angus Salcido MD 02/15/2017 06:43 P

## 2017-02-15 NOTE — EDDOCDS
Physician Documentation                                                                           

[facility]                                                                         

Name: Josiah Mayfield                                                                             

Age: 76 yrs                                                                                       

Sex: Male                                                                                         

: 1940                                                                                   

MRN: [id number]                                                                                     

Arrival Date: 02/15/2017                                                                          

Time: 11:33                                                                                       

Account#: [id number]                                                                              

Bed Admit Hold                                                                                    

Private MD: Ryan Gleason M.D.                                                                    

Disposition:                                                                                      

02/15/17 14:13 Hospitalization ordered by Tienrey Barrientos for Inpatient Admission. Preliminary           

diagnosis are Gastrointestinal hemorrhage, unspecified, Hypotension,                            

Bradycardia, unspecified, Abrasion, left knee, Abnormal brain scan - likely                     

arachnoid cyst.                                                                                 

- Bed requested for  PCU.                                                                        

- Status is Inpatient Admission.                                                              sls1

- Condition is Stable.                                                                            

- Problem is new.                                                                                 

- Symptoms are unchanged.                                                                         

                                                                                                  

                                                                                                  

                                                                                                  

Historical:                                                                                       

- Allergies: no known allergies;                                                                  

- Home Meds:                                                                                      

1. metformin 500 mg Oral tab 1 tab 2 times per day                                              

     (Last dose: Unknown)                                                                         

2. lisinopril 10 mg Oral tab 1 tab once daily                                                   

     (Last dose: Unknown)                                                                         

3. hydrochlorothiazide 12.5 mg Oral cap 6.25 mg once daily                                      

     (Last dose: Unknown)                                                                         

4. simvastatin 20 mg Oral tab 1 tab once daily                                                  

     (Last dose: Unknown)                                                                         

5. Namenda XR 28 mg oral CSpX 1 cap once daily                                                  

     (Last dose: Unknown)                                                                         

- PMHx: Hypertension; Hypercholesterolemia; Dementia;                                             

- PSHx: Knee surgery- Right;                                                                      

- Social history: Smoking status: Patient states former smoker of tobacco. No barriers            

to communication noted.                                                                         

- : The pt / caregiver states he / she is not on anticoagulants. Home medication list             

is obtained from patients' pharmacy.                                                            

- Exposure Risk Screening:: None identified.                                                      

                                                                                                  

                                                                                                  

Vital Signs:                                                                                      

02/15                                                                                             

11:45 BP 92 / 53 (auto/);                                                                     ml6 

11:45 Pulse 54 MON; Resp 16; Temp 97.4(O); Pulse Ox 98% on R/A; Weight 83.91 kg / 184.99 lbs  ml6 

      (R); Height 5 ft. 9 in. (175.26 cm) (R); Pain 0/10;                                         

12:01 BP 91 / 50 (auto/);                                                                     ml6 

12:01 Pulse 50 MON; Resp 16; Pulse Ox 99% on R/A;                                             ml6 

12:16 BP 99 / 73 (auto/);                                                                     ml6 

12:16 Pulse 57 MON; Resp 16; Pulse Ox 97% on R/A;                                             ml6 

12:23 BP 92 / 54 (auto/);                                                                     ml6 

12:23 Pulse 49 MON; Resp 16; Pulse Ox 98% on R/A; Pain 0/10;                                  ml6 

12:28  / 68 (auto/);                                                                    ml6 

12:28 Pulse 51; Resp 16; Pulse Ox 94% on R/A;                                                 ml6 

12:33  / 57 (auto/);                                                                    ml6 

12:33 Pulse 50; Resp 16; Pulse Ox 98% on R/A;                                                 ml6 

12:38 BP 95 / 51 (auto/);                                                                     ml6 

12:38 Pulse 48; Resp 16; Pulse Ox 97% on R/A;                                                 ml6 

12:58 BP 85 / 53 (auto/);                                                                     ml6 

12:58 Pulse 51 MON; Resp 16; Pulse Ox 97% on R/A;                                             ml6 

13:03 BP 85 / 52 (auto/);                                                                     ml6 

13:03 Pulse 50 MON; Resp 16; Pulse Ox 96% on R/A;                                             ml6 

13:08 BP 88 / 53 (auto/);                                                                     ml6 

13:08 Pulse 50 MON; Resp 16; Pulse Ox 98% on R/A;                                             ml6 

13:13 BP 86 / 52 (auto/);                                                                     ml6 

13:13 Pulse 50 MON; Resp 16; Pulse Ox 98% on R/A; Pain 0/10;                                  ml6 

13:18 BP 74 / 45 (auto/);                                                                     ml6 

13:18 Pulse 52 MON; Resp 16; Pulse Ox 98% on R/A; Pain 0/10;                                  ml6 

13:23 BP 73 / 43 (auto/);                                                                     ml6 

13:23 Pulse 50 MON; Resp 16; Pulse Ox 97% on R/A;                                             ml6 

13:28 BP 73 / 40 (auto/);                                                                     ml6 

13:28 Pulse 50 MON; Resp 16; Pulse Ox 94% on R/A; Pain 0/10;                                  ml6 

13:38 BP 81 / 45 (auto/);                                                                     ml6 

13:38 Pulse 52 MON; Resp 16; Pulse Ox 97% on R/A;                                             ml6 

13:48  / 57 (auto/);                                                                    ml6 

13:48 Pulse 61 MON; Resp 16; Pulse Ox 98% on R/A; Pain 0/10;                                  ml6 

14:30  / 62 (auto/);                                                                    ml6 

16:29  / 65 (auto/);                                                                    sls1

16:31 Pulse 60 MON; Pulse Ox 97% ;                                                            sls1

20:28  / 56 (auto/);                                                                    sls1

20:30 Pulse 52 MON; Pulse Ox 93% ;                                                            sls1

11:45 Body Mass Index 27.32 (83.91 kg, 175.26 cm)                                             ml6 

                                                                                                  

MDM:                                                                                              

11:43 ECG WITH READING ER PHYS+CARDIAG ordered.                                               EDMS

11:43 Cardiac Monitor/Pulse Ox/q 15 min VS ordered.                                           ml6 

11:43 IV Saline Lock ordered.                                                                 ml6 

11:43 Oxygen at 4L/Min NC or Home dosage ordered.                                             ml6 

11:43 Rhythm Strip to chart ordered.                                                          ml6 

11:44 CBC with Diff Ordered.                                                                  EDMS

11:44 Cardiac Injury Profile Ordered.                                                         EDMS

11:44 Liver Profile Ordered.                                                                  EDMS

11:44 MED Profile Ordered.                                                                    EDMS

11:44 Thyroid Stimulating Hormone Ordered.                                                    EDMS

11:44 Troponin Ordered.                                                                       EDMS

11:46 Chest, 1 View Ordered.                                                                  EDMS

12:18 NS 0.9% 500 ml IV at bolus once ordered.                                                br1 

12:18 Atropine 0.5 mg IVP once ordered.                                                       br1 

12:18 CT Head Without Contrast Ordered.                                                       EDMS

12:18 PT/INR Ordered.                                                                         EDMS

12:18 PTT Ordered.                                                                            EDMS

12:19 Knee, Complete Ordered.                                                                 EDMS

12:19 Type & Screen Ordered.                                                                  EDMS

12:38 Financial registration complete.                                                        lg  

12:51 [facility] Payment Agreement was scanned into [employer] and attached to record.               lg  

13:36 CBC with Diff Reviewed.                                                                 br1 

13:36 Cardiac Injury Profile Reviewed.                                                        br1 

13:36 MED Profile Reviewed.                                                                   br1 

13:36 Thyroid Stimulating Hormone Reviewed.                                                   br1 

13:36 Liver Profile Reviewed.                                                                 br1 

13:36 Troponin Reviewed.                                                                      br1 

13:36 Type & Screen Reviewed.                                                                 br1

13:36 PT/INR Reviewed.                                                                        br1 

13:36 PTT Reviewed.                                                                           br1 

13:36 EKG-ADULT Reviewed.                                                                     br1 

13:36 Chest, 1 View Reviewed.                                                                 br1 

13:36 CT Head Without Contrast Reviewed.                                                      br1 

13:38 NS 0.9% 500 ml IV at bolus once ordered.                                                br1 

13:38 BED REQUEST+ADM ordered.                                                                EDMS

14:30 Admission / Observation Status ordered.                                                 EDMS

14:30 CONSISTENT CARBOHYDRATES ordered.                                                       EDMS

14:31 PHYSICAL THERAPY EVAL & TREAT ordered.                                                  EDMS

14:41 FREE T4 Ordered.                                                                        EDMS

14:45 HEMOGLOBIN A1C Ordered.                                                                 EDMS

14:46 MRI Brain W/O FOLL BY WITH Ordered.                                                     EDMS

14:48 URINALYSIS Ordered.                                                                     EDMS

15:12 T-Sheet-- Draft Copy was scanned into [employer] and attached to record.                   gb  

18:15 Fingerstick Blood Sugar Ordered.                                                        EDMS

19:31 COMPLETE BLOOD COUNT Ordered.                                                           EDMS

19:31 BASIC METABOLIC PROFILE Ordered.                                                        EDMS

20:45 Fingerstick Blood Sugar Ordered.                                                        EDMS

                                                                                                  

Administered Medications:                                                                         

12:19 Drug: NS 0.9% 500 ml [sodium chloride 0.9 % intravenous solution] Route: IV; Rate:      ml6 

      bolus; Site: right forearm;                                                                 

13:15 Follow up: IV Status: Completed infusion; Infusion discontinued; IV Intake: 500ml       ml6 

12:27 Drug: Atropine 0.5 mg [atropine 0.1 mg/mL injection syringe (5 mL)] Route: IVP; Site:   ml6 

      right antecubital;                                                                          

13:42 Drug: NS 0.9% 500 ml [sodium chloride 0.9 % intravenous solution] Route: IV; Rate:      ml6 

      bolus; Site: right antecubital;                                                             

16:35 Follow up: IV Status: Completed infusion; Infusion discontinued; IV Intake: 500ml       ml6 

                                                                                                  

                                                                                                  

Signatures:                                                                                       

Dispatcher [employer]                           EDMS                                                 

Ellyn Alonso, RN            RN   daq                                                  

Norma Cooley, Reg                  Reg  gb                                                   

Melisa Sommers, Reg                    Reg  lg                                                   

Antelmo Robbins MD MD   br1                                                  

Lam Gallegos RN RN   ml6                                                  

Josie Rashid RN                     RN   sls1                                                 

                                                                                                  

The chart was reviewed and I authenticate all verbal orders and agree with the evaluation and 
treatment provided.Corrections: (The following items were deleted from the chart)

14:41 14:36 FREE T4 ordered. EDMS                                                             EDMS

14:48 14:41 HEMOGLOBIN A1C ordered. EDMS                                                      EDMS

                                                                                                  

Attachments:                                                                                      

12:51 [facility] Payment Agreement                                                                lg  

15:12 T-Sheet-- Draft Copy                                                                    gb  

                                                                                                  

**************************************************************************************************

MTDD

## 2017-02-15 NOTE — REP
CT HEAD WITHOUT CONTRAST:

 

HISTORY:  Infarction.

 

Areas of decreased attenuation are present in the periventricular white matter.

This represents small vessel ischemic disease.  A 4.1 cm cystic lesion is present

in the right frontal and parietal lobes.  There is mass effect with partial

effacement of the overlying cortical sulci.  There is no intraparenchymal

hemorrhage or midline shift.  The ventricular system and cortical sulci as well

as subarachnoid space in the posterior fossa are dilated consistent with mild

volume loss.  There is no extracerebral collection.  Mucosal thickening is

present in the ethmoid sinuses.  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

sinus.

 

IMPRESSION:

 

1.  Small vessel ischemic disease.

 

2.  Mild volume loss.

 

3.  There is a 4.1 cm cystic lesion in the right frontal and parietal lobes.

This most likely represents an arachnoid cyst, however, contrast enhanced CT or

MR may be helpful for further evaluation.

 

 

Signed by

Angus Salcido MD 02/15/2017 06:38 P

## 2017-02-16 VITALS — SYSTOLIC BLOOD PRESSURE: 128 MMHG | DIASTOLIC BLOOD PRESSURE: 70 MMHG

## 2017-02-16 VITALS — SYSTOLIC BLOOD PRESSURE: 114 MMHG | DIASTOLIC BLOOD PRESSURE: 60 MMHG

## 2017-02-16 VITALS — DIASTOLIC BLOOD PRESSURE: 89 MMHG | SYSTOLIC BLOOD PRESSURE: 177 MMHG

## 2017-02-16 VITALS — DIASTOLIC BLOOD PRESSURE: 72 MMHG | SYSTOLIC BLOOD PRESSURE: 132 MMHG

## 2017-02-16 VITALS — SYSTOLIC BLOOD PRESSURE: 128 MMHG | DIASTOLIC BLOOD PRESSURE: 128 MMHG

## 2017-02-16 LAB
ANION GAP SERPL CALC-SCNC: 8 MEQ/L (ref 8–16)
BUN SERPL-MCNC: 37 MG/DL (ref 7–18)
CALCIUM SERPL-MCNC: 9.1 MG/DL (ref 8.8–10.2)
CHLORIDE SERPL-SCNC: 103 MEQ/L (ref 98–107)
CO2 SERPL-SCNC: 29 MEQ/L (ref 21–32)
CREAT SERPL-MCNC: 0.88 MG/DL (ref 0.7–1.3)
ERYTHROCYTE [DISTWIDTH] IN BLOOD BY AUTOMATED COUNT: 12.6 % (ref 11.5–14.5)
GFR SERPL CREATININE-BSD FRML MDRD: > 60 ML/MIN/{1.73_M2} (ref 42–?)
GLUCOSE SERPL-MCNC: 105 MG/DL (ref 83–110)
MCH RBC QN AUTO: 29.2 PG (ref 27–33)
MCHC RBC AUTO-ENTMCNC: 34 G/DL (ref 32–36.5)
MCV RBC AUTO: 85.8 FL (ref 80–96)
PLATELET # BLD AUTO: 194 K/MM3 (ref 150–450)
POTASSIUM SERPL-SCNC: 3.8 MEQ/L (ref 3.5–5.1)
SODIUM SERPL-SCNC: 140 MEQ/L (ref 136–145)
WBC # BLD AUTO: 6.4 K/MM3 (ref 4–10)

## 2017-02-16 RX ADMIN — MULTIPLE VITAMINS W/ MINERALS TAB SCH TAB: TAB at 09:43

## 2017-02-16 RX ADMIN — INSULIN LISPRO SCH UNITS: 100 INJECTION, SOLUTION INTRAVENOUS; SUBCUTANEOUS at 12:12

## 2017-02-16 RX ADMIN — MEMANTINE SCH MG: 5 TABLET ORAL at 20:25

## 2017-02-16 RX ADMIN — DOCUSATE SODIUM SCH MG: 100 CAPSULE, LIQUID FILLED ORAL at 09:43

## 2017-02-16 RX ADMIN — DOCUSATE SODIUM SCH MG: 100 CAPSULE, LIQUID FILLED ORAL at 20:24

## 2017-02-16 RX ADMIN — INSULIN LISPRO SCH UNITS: 100 INJECTION, SOLUTION INTRAVENOUS; SUBCUTANEOUS at 09:42

## 2017-02-16 RX ADMIN — SODIUM CHLORIDE SCH MLS/HR: 9 INJECTION, SOLUTION INTRAVENOUS at 12:11

## 2017-02-16 RX ADMIN — INSULIN LISPRO SCH UNITS: 100 INJECTION, SOLUTION INTRAVENOUS; SUBCUTANEOUS at 20:22

## 2017-02-16 RX ADMIN — Medication SCH UNITS: at 14:59

## 2017-02-16 RX ADMIN — INSULIN LISPRO SCH UNITS: 100 INJECTION, SOLUTION INTRAVENOUS; SUBCUTANEOUS at 17:34

## 2017-02-16 RX ADMIN — SIMVASTATIN SCH MG: 20 TABLET, FILM COATED ORAL at 20:25

## 2017-02-16 RX ADMIN — ASPIRIN SCH MG: 81 TABLET ORAL at 09:42

## 2017-02-16 RX ADMIN — SODIUM CHLORIDE SCH MLS/HR: 9 INJECTION, SOLUTION INTRAVENOUS at 06:12

## 2017-02-16 RX ADMIN — LISINOPRIL SCH MG: 10 TABLET ORAL at 14:59

## 2017-02-16 NOTE — IPNPDOC
Text Note


Date of Service


The patient was seen on 2/16/17.





NOTE


Subjective:


Patient is a 76 year old  male with a PMHx of HTN, DLP, DM2, Dementia, 

who presented to the ER with complaints of frequent falls (at least 3) for the 

last 3-4 weeks. He had a recent history of right rib fractures because of a 

fall. He notes that he does not have any LOC or symptoms with the falls. 

Patient denied chest pain or head trauma.





Patient was seen and examined at the bedside.





Objective:


Vitals (See below)


General: Lying in bed, no acute distress, comfortable, AAOx3


HEENT: NC, AT


CVS: RRR, +S1S2


Lungs: Fair air entry b/l, -e/r/r


Abdomen: Soft, ND, NT, +BSx4


Extremities: +PPx4, - edema, - calf tenderness


Neuro: 5/5 strength in upper / lower extremities bilaterally





Assessment and plan:


1. Frequent falls - possibly 2/2 arachnoid cyst, possibly 2/2 electrolyte / 

vitamin deficiency, possibly 2/2 dehydration 


- Presented with at least 3 falls over 1 month duration


- Physical reveals no focal weakness


- Orthostatic vital signs have been negative; however was hypotensive in ER 


- Vitamin D level low


- CT reveals arachnoid cyst; MRI 2/15 revealed 4.4*5.3*4.1 cm arachnoid cyst in 

posterior right parietal lobe


- Discussed case with Dr. Patel - will get Cine View MRI to evaluate CSF flow; 

base on imaging will determine need for intervention


- Will supplement vitamin D orally 





2. Acute kidney injury - likely 2/2 pre-renal etiology


- Cr has trended back to normal


- s/p IV fluid hydration 





3. Hypotension - possibly 2/2 dehydration


- s/p IV fluid hydration 





4. DM2


- A1c of 6.3


- c/w ISS





5. DLP


- c/w simvastatin 





6. HTN


- BP is now hypertensive


- will restart BP medications; only Lisinopril 10 mg PO at this time





7. Dementia


- Will restart memantine 


 


8. Subclinical hyperthyroidism 





9. DVT prophylaxis


- will c/w SCDs





VS,Fishbone, I+O


VS, Fishbone, I+O


 Laboratory Tests


2/16/17 06:01








Calcium Level 9.1, Red Blood Count 4.60, Mean Corpuscular Volume 85.8, Mean 

Corpuscular Hemoglobin 29.2, Mean Corpuscular Hemoglobin Concent 34.0, Red Cell 

Distribution Width 12.6








 Vital Signs








  Date Time  Temp Pulse Resp B/P Pulse Ox O2 Delivery O2 Flow Rate FiO2


 


2/16/17 08:00 96.8 55 18 132/72 98 Room Air  














 I&O- Last 24 Hours up to 6 AM 


 


 2/16/17





 06:00


 


Intake Total 600 ml


 


Output Total 1775 ml


 


Balance -1175 ml














HYACINTH CULVER MD Feb 16, 2017 14:18

## 2017-02-16 NOTE — REP
MR CINE VIEWS:

 

HISTORY:  Arachnoid cyst.

 

A 2D phase contrast CSF flow study was performed with a velocity encoding of 10

cm per second.  CSF flow is present in the pre-pontine and medullary cisterns   ,

Aqueduct of Sylvius and outlet foramina.  CSF flow is present in the subarachnoid

space posterior to the cerebellar tonsils and in the anterior and posterior

subarachnoid space at the     cervicomedullary       junction.  The CSF flow

appears normal.

 

IMPRESSION:

 

CSF flow study as described above.

 

 

Signed by

Angus Salcido MD 02/16/2017 03:35 P

## 2017-02-17 VITALS — SYSTOLIC BLOOD PRESSURE: 111 MMHG | DIASTOLIC BLOOD PRESSURE: 64 MMHG

## 2017-02-17 VITALS — DIASTOLIC BLOOD PRESSURE: 71 MMHG | SYSTOLIC BLOOD PRESSURE: 123 MMHG

## 2017-02-17 VITALS — DIASTOLIC BLOOD PRESSURE: 56 MMHG | SYSTOLIC BLOOD PRESSURE: 109 MMHG

## 2017-02-17 VITALS — DIASTOLIC BLOOD PRESSURE: 77 MMHG | SYSTOLIC BLOOD PRESSURE: 151 MMHG

## 2017-02-17 VITALS — SYSTOLIC BLOOD PRESSURE: 136 MMHG | DIASTOLIC BLOOD PRESSURE: 71 MMHG

## 2017-02-17 LAB
ANION GAP SERPL CALC-SCNC: 8 MEQ/L (ref 8–16)
BUN SERPL-MCNC: 21 MG/DL (ref 7–18)
CALCIUM SERPL-MCNC: 8.8 MG/DL (ref 8.8–10.2)
CHLORIDE SERPL-SCNC: 104 MEQ/L (ref 98–107)
CO2 SERPL-SCNC: 29 MEQ/L (ref 21–32)
CREAT SERPL-MCNC: 0.8 MG/DL (ref 0.7–1.3)
ERYTHROCYTE [DISTWIDTH] IN BLOOD BY AUTOMATED COUNT: 12.6 % (ref 11.5–14.5)
GFR SERPL CREATININE-BSD FRML MDRD: > 60 ML/MIN/{1.73_M2} (ref 42–?)
GLUCOSE SERPL-MCNC: 111 MG/DL (ref 83–110)
MCH RBC QN AUTO: 29 PG (ref 27–33)
MCHC RBC AUTO-ENTMCNC: 33.9 G/DL (ref 32–36.5)
MCV RBC AUTO: 85.4 FL (ref 80–96)
PLATELET # BLD AUTO: 202 K/MM3 (ref 150–450)
POTASSIUM SERPL-SCNC: 3.4 MEQ/L (ref 3.5–5.1)
SODIUM SERPL-SCNC: 141 MEQ/L (ref 136–145)
WBC # BLD AUTO: 5.6 K/MM3 (ref 4–10)

## 2017-02-17 RX ADMIN — INSULIN LISPRO SCH UNITS: 100 INJECTION, SOLUTION INTRAVENOUS; SUBCUTANEOUS at 07:40

## 2017-02-17 RX ADMIN — Medication SCH UNITS: at 09:45

## 2017-02-17 RX ADMIN — SIMVASTATIN SCH MG: 20 TABLET, FILM COATED ORAL at 21:12

## 2017-02-17 RX ADMIN — LISINOPRIL SCH MG: 10 TABLET ORAL at 09:45

## 2017-02-17 RX ADMIN — DOCUSATE SODIUM SCH MG: 100 CAPSULE, LIQUID FILLED ORAL at 09:46

## 2017-02-17 RX ADMIN — ASPIRIN SCH MG: 81 TABLET ORAL at 09:45

## 2017-02-17 RX ADMIN — INSULIN LISPRO SCH UNITS: 100 INJECTION, SOLUTION INTRAVENOUS; SUBCUTANEOUS at 12:41

## 2017-02-17 RX ADMIN — INSULIN LISPRO SCH UNITS: 100 INJECTION, SOLUTION INTRAVENOUS; SUBCUTANEOUS at 21:17

## 2017-02-17 RX ADMIN — INSULIN LISPRO SCH UNITS: 100 INJECTION, SOLUTION INTRAVENOUS; SUBCUTANEOUS at 17:30

## 2017-02-17 RX ADMIN — DOCUSATE SODIUM SCH MG: 100 CAPSULE, LIQUID FILLED ORAL at 21:12

## 2017-02-17 RX ADMIN — MULTIPLE VITAMINS W/ MINERALS TAB SCH TAB: TAB at 09:46

## 2017-02-17 RX ADMIN — SODIUM CHLORIDE SCH MLS/HR: 9 INJECTION, SOLUTION INTRAVENOUS at 19:35

## 2017-02-17 RX ADMIN — MEMANTINE SCH MG: 5 TABLET ORAL at 21:12

## 2017-02-17 NOTE — IPNPDOC
Text Note


Date of Service


The patient was seen on 2/17/17.





NOTE


Subjective:


Patient is a 76 year old  male with a PMHx of HTN, DLP, DM2, Dementia, 

who presented to the ER with complaints of frequent falls (at least 3) for the 

last 3-4 weeks. He had a recent history of right rib fractures because of a 

fall. He notes that he does not have any LOC or symptoms with the falls. 

Patient denied chest pain or head trauma.





Patient was seen and examined at the bedside. Patient's daughter was available 

at the bedside. Patient has no concerns today. I have answered all of their 

questions at the bedside. 





Objective:


Vitals (See below)


General: Lying in bed, no acute distress, comfortable, AAOx3


HEENT: NC, AT


CVS: RRR, +S1S2


Lungs: Fair air entry b/l, -e/r/r


Abdomen: Soft, ND, NT, +BSx4


Extremities: +PPx4, - edema, - calf tenderness


Neuro: 5/5 strength in upper / lower extremities bilaterally





Assessment and plan:


1. Frequent falls - possibly 2/2 arachnoid cyst with compression of adjacent 

parietal lobe, possibly 2/2 electrolyte / vitamin deficiency, possibly 2/2 

dehydration 


- Presented with at least 3 falls over 1 month duration


- Physical reveals no focal weakness


- Orthostatic vital signs have been negative; however was hypotensive in ER 


- Vitamin D level low


- CT reveals arachnoid cyst; MRI 2/15 revealed 4.4*5.3*4.1 cm arachnoid cyst in 

posterior right parietal lobe


- Discussed with Dr. Patel - will get Cisternogram completed on Monday (

pending arrival of intrathecal contrast)


- Risks and benefits have been expressed to the patient by Dr. Patel 


- Will consult Neurology (Dr. Warren) - appreciate their input 


- c/w vitamin D supplementation orally 


- Will continue with physical therapy 





2. Acute kidney injury - likely 2/2 pre-renal etiology


- Cr has trended back to normal


- s/p IV fluid hydration 





3. Hypotension - possibly 2/2 dehydration


- s/p IV fluid hydration 





4. DM2


- A1c of 6.3


- c/w ISS





5. DLP


- c/w simvastatin 





6. HTN


- BP well controlled


- c/w Lisinopril 10 mg PO at this time





7. Dementia


- c/w memantine 


 


8. Subclinical hyperthyroidism 





9. DVT prophylaxis


- c/w SCDs





VS,Fishbone, I+O


VS, Fishbone, I+O


 Laboratory Tests


2/17/17 05:15








Calcium Level 8.8, Red Blood Count 4.67, Mean Corpuscular Volume 85.4, Mean 

Corpuscular Hemoglobin 29.0, Mean Corpuscular Hemoglobin Concent 33.9, Red Cell 

Distribution Width 12.6








 Vital Signs








  Date Time  Temp Pulse Resp B/P Pulse Ox O2 Delivery O2 Flow Rate FiO2


 


2/17/17 09:45    123/71    


 


2/17/17 07:55 96.6 73 20  98 Room Air  














 I&O- Last 24 Hours up to 6 AM 


 


 2/17/17





 06:00


 


Intake Total 2490 ml


 


Output Total 3100 ml


 


Balance -610 ml














HYACINTH CULVER MD Feb 17, 2017 13:00

## 2017-02-18 VITALS — DIASTOLIC BLOOD PRESSURE: 86 MMHG | SYSTOLIC BLOOD PRESSURE: 178 MMHG

## 2017-02-18 VITALS — DIASTOLIC BLOOD PRESSURE: 63 MMHG | SYSTOLIC BLOOD PRESSURE: 137 MMHG

## 2017-02-18 VITALS — SYSTOLIC BLOOD PRESSURE: 141 MMHG | DIASTOLIC BLOOD PRESSURE: 68 MMHG

## 2017-02-18 LAB
ANION GAP SERPL CALC-SCNC: 7 MEQ/L (ref 8–16)
BUN SERPL-MCNC: 18 MG/DL (ref 7–18)
CALCIUM SERPL-MCNC: 8.6 MG/DL (ref 8.8–10.2)
CHLORIDE SERPL-SCNC: 107 MEQ/L (ref 98–107)
CO2 SERPL-SCNC: 28 MEQ/L (ref 21–32)
CREAT SERPL-MCNC: 0.76 MG/DL (ref 0.7–1.3)
ERYTHROCYTE [DISTWIDTH] IN BLOOD BY AUTOMATED COUNT: 12.6 % (ref 11.5–14.5)
GFR SERPL CREATININE-BSD FRML MDRD: > 60 ML/MIN/{1.73_M2} (ref 42–?)
GLUCOSE SERPL-MCNC: 98 MG/DL (ref 83–110)
MCH RBC QN AUTO: 29 PG (ref 27–33)
MCHC RBC AUTO-ENTMCNC: 33.9 G/DL (ref 32–36.5)
MCV RBC AUTO: 85.6 FL (ref 80–96)
PLATELET # BLD AUTO: 202 K/MM3 (ref 150–450)
POTASSIUM SERPL-SCNC: 3.9 MEQ/L (ref 3.5–5.1)
SODIUM SERPL-SCNC: 142 MEQ/L (ref 136–145)
WBC # BLD AUTO: 5.8 K/MM3 (ref 4–10)

## 2017-02-18 RX ADMIN — SIMVASTATIN SCH MG: 20 TABLET, FILM COATED ORAL at 21:08

## 2017-02-18 RX ADMIN — INSULIN LISPRO SCH UNITS: 100 INJECTION, SOLUTION INTRAVENOUS; SUBCUTANEOUS at 17:54

## 2017-02-18 RX ADMIN — INSULIN LISPRO SCH UNITS: 100 INJECTION, SOLUTION INTRAVENOUS; SUBCUTANEOUS at 11:49

## 2017-02-18 RX ADMIN — DOCUSATE SODIUM SCH MG: 100 CAPSULE, LIQUID FILLED ORAL at 21:08

## 2017-02-18 RX ADMIN — ASPIRIN SCH MG: 81 TABLET ORAL at 08:46

## 2017-02-18 RX ADMIN — Medication SCH UNITS: at 08:46

## 2017-02-18 RX ADMIN — ACETAMINOPHEN PRN MG: 325 TABLET ORAL at 00:50

## 2017-02-18 RX ADMIN — DOCUSATE SODIUM SCH MG: 100 CAPSULE, LIQUID FILLED ORAL at 08:45

## 2017-02-18 RX ADMIN — ACETAMINOPHEN PRN MG: 325 TABLET ORAL at 22:32

## 2017-02-18 RX ADMIN — LISINOPRIL SCH MG: 10 TABLET ORAL at 08:46

## 2017-02-18 RX ADMIN — MEMANTINE SCH MG: 5 TABLET ORAL at 21:09

## 2017-02-18 RX ADMIN — INSULIN LISPRO SCH UNITS: 100 INJECTION, SOLUTION INTRAVENOUS; SUBCUTANEOUS at 21:00

## 2017-02-18 RX ADMIN — MULTIPLE VITAMINS W/ MINERALS TAB SCH TAB: TAB at 08:46

## 2017-02-18 RX ADMIN — INSULIN LISPRO SCH UNITS: 100 INJECTION, SOLUTION INTRAVENOUS; SUBCUTANEOUS at 07:30

## 2017-02-18 NOTE — EDDOCDS
Nurse's Notes                                                                                     

[facility]                                                                         

Name: Chet Mayfield                                                                             

Age: 76 yrs                                                                                       

Sex: Male                                                                                         

: 1940                                                                                   

MRN: [id number]                                                                                     

Arrival Date: 02/15/2017                                                                          

Time: 11:33                                                                                       

Account#: [id number]                                                                              

Bed Admit Hold                                                                                    

Private MD: Ryan Gleason M.D.                                                                    

Diagnosis: Gastrointestinal hemorrhage, unspecified;Hypotension;Bradycardia, unspecified;Abrasion,

left knee;Abnormal brain scan-likely arachnoid cyst                                             

                                                                                                  

Presentation:                                                                                     

02/15                                                                                             

11:40 Presenting complaint: EMS states: states was up visiting wife at [facility], became disorient  ml6 

      and fell x 3. Adult Sepsis Screening: Patient has new or worsening altered mentation (1     

      point). Patient's respiratory rate is less than 22. Systolic blood pressure is less         

      than or equal to 100 (1 point). Patient has a qSOFA score of 2. No known or suspected       

      infection- Negative Sepsis Screen. Suicide/Homicide risk assessment- the patient denies     

      having any suicidal and/or homicidal ideations and does not present with any other          

      emotional, behavioral or mental health complaints.  Status: Patient is not a        

       or  dependent. Transition of care: patient was not received from     

      another setting of care.                                                                    

11:40 Acuity: NILESH Level 2                                                                     ml6 

11:40 Method Of Arrival: Ambulance                                                            ml6 

                                                                                                  

Triage Assessment:                                                                                

11:40 General: Appears in no apparent distress, Behavior is appropriate for age, cooperative. ml6 

      Pain: Denies pain. Neurological: No deficits noted. Level of Consciousness is awake,        

      alert, Oriented to person, place, time. Cardiovascular: No deficits noted. Capillary        

      refill < 3 seconds is brisk in bilateral fingers toes. Respiratory: No deficits noted.      

      GI: No deficits noted. Musculoskeletal: Circulation, motion, and sensation intact           

      Capillary refill < 3 seconds is brisk in bilateral fingers toes Range of motion intact      

      in all extremities. No deformity noted Swelling absent Signs and Symptoms of                

      Compartment Syndrome: no signs of compartment syndrome.                                     

                                                                                                  

Historical:                                                                                       

- Allergies: no known allergies;                                                                  

- Home Meds:                                                                                      

1. metformin 500 mg Oral tab 1 tab 2 times per day                                              

     (Last dose: Unknown)                                                                         

2. lisinopril 10 mg Oral tab 1 tab once daily                                                   

     (Last dose: Unknown)                                                                         

3. hydrochlorothiazide 12.5 mg Oral cap 6.25 mg once daily                                      

     (Last dose: Unknown)                                                                         

4. simvastatin 20 mg Oral tab 1 tab once daily                                                  

     (Last dose: Unknown)                                                                         

5. Namenda XR 28 mg oral CSpX 1 cap once daily                                                  

     (Last dose: Unknown)                                                                         

- PMHx: Hypertension; Hypercholesterolemia; Dementia;                                             

- PSHx: Knee surgery- Right;                                                                      

- Social history: Smoking status: Patient states former smoker of tobacco. No barriers            

to communication noted.                                                                         

- : The pt / caregiver states he / she is not on anticoagulants. Home medication list             

is obtained from patients' pharmacy.                                                            

- Exposure Risk Screening:: None identified.                                                      

                                                                                                  

                                                                                                  

Screenin:47 Screening information is obtained from the patient. Fall risk: At risk due to apparent  ml6 

      cognitive impairment, prior history of falls. Assistance ADL's: requires no assistance      

      with activities of daily living. Abuse/DV Screen: The patient / caregiver reports           

      he/she is: not in a situation that causes fear, pain or injury. Nutritional screening:      

      No deficits noted. Advance Directives: Currently, there is no health care proxy. home       

      support is adequate.                                                                        

                                                                                                  

Assessment:                                                                                       

11:40 General: Appears in no apparent distress, see triage assessment.                        ml6 

12:40 General: Appears in no apparent distress, comfortable, Behavior is appropriate for age, ml6 

      cooperative. Pain: Denies pain. Neurological: Level of Consciousness is awake, alert,       

      Oriented to person, place, time,  are equal bilaterally Moves all extremities.         

      Full function Gait is steady, Speech with expressive aphasia noted, Facial symmetry         

      appears normal, Pupils are PERRLA, per daughter patient normally has expressive aphasia     

      .                                                                                           

13:40 Reassessment: Patient appears in no apparent distress at this time. Patient denies pain ml6 

      at this time. Patient states feeling better. Patient states symptoms have improved.         

      patient states that fatigue and weakness have improved after IVF.                           

14:40 Reassessment: Patient appears in no apparent distress at this time. Patient denies pain ml6 

      at this time. Patient states feeling better. Patient states symptoms have improved. no      

      change from previous assessment.                                                            

15:40 General: Appears in no apparent distress, comfortable, Behavior is appropriate for age, ml6 

      cooperative. Pain: Denies pain. Neurological: No deficits noted. Level of Consciousness     

      is awake, alert, Oriented to person, place, time,  are equal bilaterally Moves all     

      extremities. Full function Gait is steady, Speech with expressive aphasia noted.            

      Cardiovascular: No deficits noted. Capillary refill < 3 seconds is brisk in bilateral       

      fingers toes. Respiratory: No deficits noted. GI: No deficits noted.                        

16:29 General: patient transported to MRI.                                                    ml6 

                                                                                                  

Vital Signs:                                                                                      

11:45 BP 92 / 53 (auto/);                                                                     ml6 

11:45 Pulse 54 MON; Resp 16; Temp 97.4(O); Pulse Ox 98% on R/A; Weight 83.91 kg (R); Height 5 ml6 

      ft. 9 in. (175.26 cm) (R); Pain 0/10;                                                       

12:01 BP 91 / 50 (auto/);                                                                     ml6 

12:01 Pulse 50 MON; Resp 16; Pulse Ox 99% on R/A;                                             ml6 

12:16 BP 99 / 73 (auto/);                                                                     ml6 

12:16 Pulse 57 MON; Resp 16; Pulse Ox 97% on R/A;                                             ml6 

12:23 BP 92 / 54 (auto/);                                                                     ml6 

12:23 Pulse 49 MON; Resp 16; Pulse Ox 98% on R/A; Pain 0/10;                                  ml6 

12:28  / 68 (auto/);                                                                    ml6 

12:28 Pulse 51; Resp 16; Pulse Ox 94% on R/A;                                                 ml6 

12:33  / 57 (auto/);                                                                    ml6 

12:33 Pulse 50; Resp 16; Pulse Ox 98% on R/A;                                                 ml6 

12:38 BP 95 / 51 (auto/);                                                                     ml6 

12:38 Pulse 48; Resp 16; Pulse Ox 97% on R/A;                                                 ml6 

12:58 BP 85 / 53 (auto/);                                                                     ml6 

12:58 Pulse 51 MON; Resp 16; Pulse Ox 97% on R/A;                                             ml6 

13:03 BP 85 / 52 (auto/);                                                                     ml6 

13:03 Pulse 50 MON; Resp 16; Pulse Ox 96% on R/A;                                             ml6 

13:08 BP 88 / 53 (auto/);                                                                     ml6 

13:08 Pulse 50 MON; Resp 16; Pulse Ox 98% on R/A;                                             ml6 

13:13 BP 86 / 52 (auto/);                                                                     ml6 

13:13 Pulse 50 MON; Resp 16; Pulse Ox 98% on R/A; Pain 0/10;                                  ml6 

13:18 BP 74 / 45 (auto/);                                                                     ml6 

13:18 Pulse 52 MON; Resp 16; Pulse Ox 98% on R/A; Pain 0/10;                                  ml6 

13:23 BP 73 / 43 (auto/);                                                                     ml6 

13:23 Pulse 50 MON; Resp 16; Pulse Ox 97% on R/A;                                             ml6 

13:28 BP 73 / 40 (auto/);                                                                     ml6 

13:28 Pulse 50 MON; Resp 16; Pulse Ox 94% on R/A; Pain 0/10;                                  ml6 

13:38 BP 81 / 45 (auto/);                                                                     ml6 

13:38 Pulse 52 MON; Resp 16; Pulse Ox 97% on R/A;                                             ml6 

13:48  / 57 (auto/);                                                                    ml6 

13:48 Pulse 61 MON; Resp 16; Pulse Ox 98% on R/A; Pain 0/10;                                  ml6 

14:30  / 62 (auto/);                                                                    ml6 

16:29  / 65 (auto/);                                                                    sls1

16:31 Pulse 60 MON; Pulse Ox 97% ;                                                            sls1

20:28  / 56 (auto/);                                                                    sls1

20:30 Pulse 52 MON; Pulse Ox 93% ;                                                            sls1

11:45 Body Mass Index 27.32 (83.91 kg, 175.26 cm)                                             ml6 

                                                                                                  

Vitals:                                                                                           

11:51 Log In Time N/A - ambulance arrival.                                                    ml6 

                                                                                                  

ED Course:                                                                                        

11:34 Patient visited by Veronica Piedra, Unit Clerk.                                             lbd 

11:34 Patient moved to Waiting                                                                lbd 

11:35 Ryan Gleason is Private Physician.                                                      lbd 

11:35 Patient moved to 3                                                                      lbd 

11:41 Triage Initiated                                                                        ml6 

11:47 The patient / caregiver is instructed regarding the plan of care and ED course. Cardiac ml6 

      monitor on. Pulse ox on. NIBP on.                                                           

11:47 Inserted peripheral IV: 16gauge IV in left antecubital area and blood collected.        ml6 

      Patient tolerated the procedure well. Inserted peripheral IV: 18gauge IV in right           

      antecubital area and blood collected. Patient tolerated the procedure well. No              

      procedures done that require assistance. Labs drawn. (by ED staff). Sent per order to       

      lab.                                                                                        

11:49 Patient visited by Archie Duron.                                                       jml1

11:49 EKG done. (by ED staff). Reviewed by Antelmo Robbins MD.                                   jml1

12:04 Antelmo Robbins MD is Attending Physician.                                               br1 

12:17 Patient visited by Antelmo Robbins MD.                                                   br1 

12:47 EKG-ADULT Returned.                                                                     EDMS

12:49 Patient name changed from Chet\S\\S\Mayfield\S\ to Chet\S\T\S\Mayfield.                    
   EDMS

12:51 Patient visited by Lam Gallegos RN.                                                   ml6 

12:51 [facility] Payment Agreement was scanned into hulu and attached to record.               lg  

12:51 Chest, 1 View Returned.                                                                 EDMS

13:14 Patient visited by Lam Gallegos RN.                                                   ml6 

13:34 CT Head Without Contrast Returned.                                                      EDMS

13:45 Patient visited by Lam Gallegos RN.                                                   ml6 

13:47 Patient moved to CT                                                                     ml6 

13:48 Patient moved to 3                                                                      ek2 

14:05 Tierney Barrientos .                                                                 ys2 

14:06 Patient visited by Antelmo Robbins MD.                                                   br1 

14:12 Tierney Barrientos is Hospitalizing Provider.                                                     br1 

15:12 T-Sheet-- Draft Copy was scanned into hulu and attached to record.                   gb  

15:48 Knee, Complete Returned.                                                                EDMS

16:28 Patient moved to 18                                                                     ml6 

19:06 CT Head Without Contrast Returned.                                                      EDMS

19:07 MRI Brain W/O FOLL BY WITH Returned.                                                    EDMS

19:34 Patient moved to Admit Hold                                                             sls1

                                                                                                  

Administered Medications:                                                                         

12:19 Drug: NS 0.9% 500 ml [sodium chloride 0.9 % intravenous solution] Route: IV; Rate:      ml6 

      bolus; Site: right forearm;                                                                 

13:15 Follow up: IV Status: Completed infusion; Infusion discontinued; IV Intake: 500ml       ml6 

12:27 Drug: Atropine 0.5 mg [atropine 0.1 mg/mL injection syringe (5 mL)] Route: IVP; Site:   ml6 

      right antecubital;                                                                          

13:42 Drug: NS 0.9% 500 ml [sodium chloride 0.9 % intravenous solution] Route: IV; Rate:      ml6 

      bolus; Site: right antecubital;                                                             

16:35 Follow up: IV Status: Completed infusion; Infusion discontinued; IV Intake: 500ml       ml6 

                                                                                                  

                                                                                                  

Intake:                                                                                           

13:15 IV: 500.00ml; Total: 500.00ml.                                                          ml6 

16:35 IV: 500.00ml; Total: 1000.00ml.                                                         ml6 

                                                                                                  

Order Results:                                                                                    

Lab Order: CBC with Diff; SPEC'M 02/15/17 11:46                                                   

      Test: WHITE BLOOD COUNT; Value: 7.2; Range: 4.0-10.0; Units: K/mm3; Status: F               

      Test: RED BLOOD COUNT; Value: 4.83; Range: 4.30-6.10; Units: M/mm3; Status: F               

      Test: HEMOGLOBIN; Value: 14.0; Range: 14.0-18.0; Units: g/dl; Status: F                     

      Test: HEMATOCRIT; Value: 41.5; Range: 42.0-52.0; Abnormal: Below low normal; Units: %;      

      Status: F                                                                                   

      Test: MEAN CORPUSCULAR VOLUME; Value: 85.9; Range: 80.0-96.0; Units: fl; Status: F          

      Test: MEAN CORPUSCULAR HEMOGLOBIN; Value: 28.9; Range: 27.0-33.0; Units: pg; Status: F      

      Test: MEAN CORPUSCULAR HGB CONC; Value: 33.6; Range: 32.0-36.5; Units: g/dl; Status: F      

      Test: RED CELL DISTRIBUTION WIDTH; Value: 12.6; Range: 11.5-14.5; Units: %; Status: F       

      Test: PLATELET COUNT, AUTOMATED; Value: 232; Range: 150-450; Units: k/mm3; Status: F        

      Test: NEUTROPHILS %; Value: 72.0; Range: 36.0-66.0; Abnormal: Above high normal; Units:     

      %; Status: F                                                                                

      Test: LYMPH %; Value: 18.3; Range: 24.0-44.0; Abnormal: Below low normal; Units: %;         

      Status: F                                                                                   

      Test: MONO %; Value: 5.7; Range: 0.0-5.0; Abnormal: Above high normal; Units: %;            

      Status: F                                                                                   

      Test: EOS %; Value: 2.7; Range: 0.0-3.0; Units: %; Status: F                                

      Test: BASO %; Value: 0.4; Range: 0.0-1.0; Units: %; Status: F                               

      Test: LARGE UNSTAINED CELL %; Value: 1.0; Range: 0.0-4.0; Units: %; Status: F               

      Test: NEUTROPHILS #; Value: 5.2; Range: 1.8-7.7; Units: K/mm3; Status: F                    

      Test: LYMPH #; Value: 1.4; Range: 1.5-4.5; Abnormal: Below low normal; Units: K/mm3;        

      Status: F                                                                                   

      Test: MONO #; Value: 0.4; Range: 0.0-0.8; Units: K/mm3; Status: F                           

      Test: EOS #; Value: 0.2; Range: 0.0-0.50; Units: K/mm3; Status: F                           

      Test: BASO #; Value: 0.0; Range: 0.0-0.2; Units: K/mm3; Status: F                           

      Test: LARGE UNSTAINED CELL #; Value: 0.1; Range: 0.0-0.4; Units: K/mm3; Status: F           

Lab Order: Cardiac Injury Profile; SPEC'M 02/15/17 11:46                                          

      Test: CPK CREATINE PHOSPHOKINASE; Value: 208; Range: ; Units: U/L; Status: F          

      Test: CK-MB VALUE MASS; Value: 4.3; Range: 0.0-3.6; Abnormal: Above high normal; Units:     

      NG/ML; Status: F                                                                            

      Test: MB/CK RELATIVE INDEX; Value: 2.06; Range: < OR =4; Status: F                          

      Test Note: &nbsp;; DIAGNOSIS CRITERIA MMB ng/ml Relative Index (RI) NON-AMI < or = 5      

      N/A GRAY ZONE > 5 < or = 4 AMI > 5 > 4                                                      

Lab Order: Liver Profile; SPEC'M 02/15/17 11:46                                                   

      Test: AST/SGOT; Value: 23; Range: 15-37; Units: U/L; Status: F                              

      Test: ALT/SGPT; Value: 30; Range: 12-78; Units: U/L; Status: F                              

      Test: ALKALINE PHOSPHATASE; Value: 84; Range: ; Units: U/L; Status: F                 

      Test: BILIRUBIN,TOTAL; Value: 0.7; Range: 0.2-1.0; Units: MG/DL; Status: F                  

      Test: BILIRUBIN,DIRECT; Value: 0.1; Range: 0.0-0.2; Units: MG/DL; Status: F                 

      Test: TOTAL PROTEIN; Value: 7.5; Range: 6.4-8.2; Units: GM/DL; Status: F                    

      Test: ALBUMIN; Value: 3.8; Range: 3.2-5.2; Units: GM/DL; Status: F                          

      Test: ALBUMIN/GLOBULIN RATIO; Value: 1.03; Range: 1.00-1.93; Status: F                      

Lab Order: MED Profile; SPEC'M 02/15/17 11:46                                                     

      Test: GLUCOSE, FASTING; Value: 120; Range: ; Abnormal: Above high normal; Units:      

      MG/DL; Status: F                                                                            

      Test: BLOOD UREA NITROGEN; Value: 50; Range: 7-18; Abnormal: Above high normal; Units:      

      MG/DL; Status: F                                                                            

      Test: CREATININE FOR GFR; Value: 1.58; Range: 0.70-1.30; Abnormal: Above high normal;       

      Units: MG/DL; Status: F                                                                     

      Test: GLOMERULAR FILTRATION RATE; Value: 45.7; Range: >42; Status: F                        

      Test: SODIUM LEVEL; Value: 140; Range: 136-145; Units: MEQ/L; Status: F                     

      Test: POTASSIUM SERUM; Value: 4.4; Range: 3.5-5.1; Units: MEQ/L; Status: F                  

      Test: CHLORIDE LEVEL; Value: 101; Range: ; Units: MEQ/L; Status: F                    

      Test: CARBON DIOXIDE LEVEL; Value: 33; Range: 21-32; Abnormal: Above high normal;           

      Units: MEQ/L; Status: F                                                                     

      Test: ANION GAP; Value: 6; Range: 8-16; Abnormal: Below low normal; Units: MEQ/L;           

      Status: F                                                                                   

      Test: CALCIUM LEVEL; Value: 9.0; Range: 8.8-10.2; Units: MG/DL; Status: F                   

      Test Note: &nbsp;; Units are mL/min/1.73 m2 Chronic Kidney Disease Staging per NKF:       

      Stage I & II GFR >=60 Normal to Mildly Decreased Stage III GFR 30-59 Moderately           

      Decreased Stage IV GFR 15-29 Severely Decreased Stage V GFR <15 Very Little GFR Left        

      ESRD GFR <15 on RRT                                                                         

Lab Order: Thyroid Stimulating Hormone; SPEC'M 02/15/17 11:46                                     

      Test: THYROID STIMULATING HORMONE; Value: 0.315; Range: 0.358-3.740; Abnormal: Below        

      low normal; Units: uIU/ML; Status: F                                                        

Lab Order: Troponin; SPEC'M 02/15/17 11:46                                                        

      Test: TROPONIN I; Value: < 0.02; Range: < 0.10; Units: NG/ML; Status: F                     

      Test Note: &nbsp;; Troponin I Reference Interval for [employer] LOCI: 99th             

      Percentile= 0.00-0.045 ng/ml Risk Stratification: <= 0.10 ng/ml Decreased Risk for          

      Adverse Clinical Events. 0.10-1.50 ng/ml Increased Risk for Adverse Clinical Events.        

      Evaluation of additional criterion and/or repeat testing in 2-6 hours is suggested to       

      rule out myocardial damage. >= 1.50 ng/ml Indicative of Myocardial Injury.                  

Lab Order: Type & Screen; SPEC'M 02/15/17 11:46                                                 

      Test: BLOOD TYPE; Value: O POS; Status: F                                                   

      Test: AB SCREEN (INDIRECT SHAD)VIS; Value: NEGATIVE; Status: F                            

Lab Order: PT/INR; SPEC'M 02/15/17 11:46                                                          

      Test: PROTHROMBIN TIME; Value: 12.8; Range: 12.3-14.5; Units: SECONDS; Status: F            

      Test: INR; Value: 0.95; Status: F                                                           

      Test Note: &nbsp;; THERAPUTIC HUMAN INR VALUES INDICATIONS NORMAL RANGES                  

      PROPHYLAXIS/TREATMENT OF: VENOUS THROMBOSIS 2.0-3.0 PULMONARY EMBOLISM 2.0-3.0              

      PREVENTION OF SYSTEMIC EMBOLISM FROM: TISSUE HEART VALVES 2.0-3.0 ACUTE MYOCARDIAL          

      INFARCTION 2.0-3.0 VALVULAR HEART DISEASE 2.0-3.0 ATRIAL FIBRILLATION 2.0-3.0               

      MECHANICAL VALVES(HIGH RISK) 2.5-3.5 RECURRENT MYOCARDIAL INFARCTION 2.5-3.5                

Lab Order: PTT; SPEC 02/15/17 11:46                                                             

      Test: PARTIAL THROMBOPLASTIN TIME; Value: 27.6; Range: 26.6-37.1; Units: SECONDS;           

      Status: F                                                                                   

Lab Order: FREE T4; SPEC 02/15/17 11:46                                                         

      Test: FREE T4; Value: 1.04; Range: 0.76-1.46; Units: NG/DL; Status: F                       

Lab Order: HEMOGLOBIN A1C; SPEC 02/15/17 11:46                                                  

      Test: HEMOGLOBIN A1c; Value: 6.3; Range: 4.5-6.2; Abnormal: Above high normal; Units:       

      %; Status: F                                                                                

      Test: ESTIMATED AVERAGE GLUCOSE; Value: 134; Range: ; Abnormal: Above high            

      normal; Units: MG/DL; Status: F                                                             

Lab Order: Fingerstick Blood Sugar; SPEC'M 02/15/17 18:07                                         

      Test: BEDSIDE GLUCOSE; Value: 97; Range: ; Units: MG/DL; Status: F                    

Lab Order: Fingerstick Blood Sugar; SPEC'M 02/15/17 20:27                                         

      Test: BEDSIDE GLUCOSE; Value: 141; Range: ; Abnormal: Above high normal; Units:       

      MG/DL; Status: F                                                                            

                                                                                                  

Radiology Order: EKG-ADULT                                                                        

      Test: EKG-ADULT                                                                             

      REASON FOR EXAMINATION: hypotension; Stationary ECG Study; [facility] - ED; ;       

      Test Date: 2017-02-15; Pat Name: CHET MAYFIELD Department:; Patient ID: [id number]          

      Room: -; Gender: M Technician: JT; : 1940 Requested By: ANTELMO SCOTT; Order     

      Number: [id number] Reading MD: Geronimo Sue; Measurements; Intervals Axis;        

      Rate: 46 P: 35; IA: 189 QRS: 1; QRSD: 105 T: 15; QT: 431; QTc: 381; Interpretive            

      Statements; SINUS BRADYCARDIA; MODERATE VOLTAGE CRITERIA FOR LVH, CONSIDER NORMAL           

      VARIANT; ANTEROLATERAL ST ELEVATION, INFERIOR ST DEPRESSION CONSIDER ACUTE INJURY;          

      Electronically Signed On 2- 12:36:52 EST by Geronimo Sue;                            

Radiology Order: Chest, 1 View                                                                    

      Test: Chest, 1 View                                                                         

      REASON FOR EXAMINATION: CVA <4.5hrs; AP PORTABLE CHEST: 02/15/2017; ; Comparison: Right     

      ribs 2017.; ; Clinical history: CVA less than 4.5 hours.; ; Findings: Lungs are       

      well inflated. The CP angles sharply defined without; effusion, lateral pleural             

      thickening or apical scarring. There is no definite; infiltrate. Some minor basilar         

      fibrotic changes are seen. No gross; cardiomegaly, vascular redistribution or pulmonary     

      edema. The aorta is mildly; tortuous but normal for age. Airway intact. There are           

      degenerative changes in; the spine and shoulders.; ; Impression:; ; 1. Minor basilar        

      fibrotic change without dense consolidation or effusion,; cardiomegaly, edema or other      

      acute finding.; ; ; Signed by; Matthias Gallardo MD 02/15/2017 12:48 P;                      

Radiology Order: CT Head Without Contrast                                                         

      Test: CT Head Without Contrast                                                              

      REASON FOR EXAMINATION: CVA <4.5hrs; CT HEAD WITHOUT CONTRAST:; ; HISTORY: Infarction.;     

      ; Areas of decreased attenuation are present in the periventricular white matter.; This     

      represents small vessel ischemic disease. A 4.1 cm cystic lesion is present; in the         

      right frontal and parietal lobes. There is mass effect with partial; effacement of the      

      overlying cortical sulci. There is no intraparenchymal; hemorrhage or midline shift.        

      The ventricular system and cortical sulci as well; as subarachnoid space in the             

      posterior fossa are dilated consistent with mild; volume loss. There is no                  

      extracerebral collection. Mucosal thickening is; present in the ethmoid sinuses. A 3 mm     

      osteoma is present in the right ethmoid; sinus.; ; IMPRESSION:; ; 1. Small vessel           

      ischemic disease.; ; 2. Mild volume loss.; ; 3. There is a 4.1 cm cystic lesion in the      

      right frontal and parietal lobes.; This most likely represents an arachnoid cyst,           

      however, contrast enhanced CT or; MR may be helpful for further evaluation.; ; ; Signed     

      by; Angus Salcido MD 02/15/2017 06:38 P;                                                    

Radiology Order: Knee, Complete                                                                   

      Test: Knee, Complete                                                                        

      REASON FOR EXAMINATION: Trauma; LEFT KNEE SERIES, COMPLETE: 02/15/2017.; ; Clinical         

      history: Trauma. Patient fell with knee contusion and pain.; ; Findings: No prior           

      studies. [place] view shows narrowing of the patellofemoral; joint laterally and a few      

      millimeters of lateral subluxation of the marginal; osteophytes patellofemoral joint        

      calcifications and ossifications just above the; patella at its articular margin on the     

      lateral view were a fabella is also seen.; There are small ossific densities posterior      

      to the proximal tibiofibular; articulation and another along the posterior margin of        

      the tibial plafond on the; lateral view. Medial compartment shows marginal osteophytes      

      and tibial spine; spurs without joint space narrowing. The medial and lateral               

      compartments are; without narrowing.; ; Impression:; ; 1. Tricompartment osteoarthritis     

      with narrowing at the patellofemoral joint and; lateral patellar subluxation. This          

      suggests chondromalacia. No definite joint; effusion. Some marginal osteophytes and         

      small ossific densities adjacent to the; upper pole of the patella and posterior margin     

      of the tibial plateau suggesting; avulsions or osteochondral defects, likewise 3-4          

      small calcifications along the; posterior margin of the proximal tibiofibular joint. No     

      definite fractures.; ; ; Signed by; Matthias Gallardo MD 02/15/2017 03:30 P;                 

Radiology Order: MRI Brain W/O FOLL BY WITH                                                       

      Test: MRI Brain W/O FOLL BY WITH                                                            

      REASON FOR EXAMINATION: brain cystic lesion; MR BRAIN WITHOUT AND WITH CONTRAST:; ;         

      HISTORY: Cystic lesion.; ; CONTRAST: ProHance 8 mL.; ; COMPARISON: CT 02/15/2017; ;         

      Areas of increased signal intensity on T2 weighted images are present in the;               

      periventricular and subcortical white matter. This represents small vessel; ischemic        

      disease. There is no intraparenchymal hemorrhage, infarct or mass. The; ventricular         

      system and cortical sulci are dilated consistent with mild volume; loss. An arachnoid       

      cyst is present overlying the posterior right parietal lobe.; The arachnoid cyst            

      measure 4.4 cm in transverse x 5.3 cm in AP x 4.1 cm in; cephalocaudal dimensions.          

      There is mass effect on the adjacent right parietal; lobe without midline shift. There      

      is no abnormal enhancement . There is no; extracerebral collection. Mucosal thickening      

      is present in the maxillary and left; sphenoid sinuses.; ; IMPRESSION:; ; 1. Small          

      vessel ischemic disease.; ; 2. Mild volume loss.; ; 3. There is an arachnoid cyst           

      overlying the posterior right parietal lobe. There; is no midline shift.; ; ; Signed        

      by; Angus Salcido MD 02/15/2017 06:43 P;                                                    

Outcome:                                                                                          

14:13 Decision to Hospitalize by Provider.                                                    br1 

23:18 Patient left the ED.                                                                    sls1

                                                                                                  

Signatures:                                                                                       

Dispatcher [employer]                           EDMS                                                 

Veronica Piedra, Unit Clerk                 Unit lbd                                                  

Norma Cooley, Reg                  Reg  gb                                                   

Melisa Sommers, Reg                    Reg  lg                                                   

Antelmo Robbins MD MD   br1                                                  

Lam Gallegos RN                       RN   ml6                                                  

Josie Rashid RN                     RN   sls1                                                 

Archie Duronl1                                                 

Elliott Johnosn                                ek2                                                  

Tierney Barrientos                                     ys2                                                  

                                                                                                  

**************************************************************************************************



*** Chart Complete ***
MTDD

## 2017-02-18 NOTE — EDDOCDS
Physician Documentation                                                                           

[facility]                                                                         

Name: Josiah Mayfield                                                                             

Age: 76 yrs                                                                                       

Sex: Male                                                                                         

: 1940                                                                                   

MRN: [id number]                                                                                     

Arrival Date: 02/15/2017                                                                          

Time: 11:33                                                                                       

Account#: [id number]                                                                              

Bed Admit Hold                                                                                    

Private MD: Ryan lGeason M.D.                                                                    

Disposition:                                                                                      

02/15/17 14:13 Hospitalization ordered by Tierney Barrientos for Inpatient Admission. Preliminary           

diagnosis are Gastrointestinal hemorrhage, unspecified, Hypotension,                            

Bradycardia, unspecified, Abrasion, left knee, Abnormal brain scan - likely                     

arachnoid cyst.                                                                                 

- Bed requested for  PCU.                                                                        

- Status is Inpatient Admission.                                                              sls1

- Condition is Stable.                                                                            

- Problem is new.                                                                                 

- Symptoms are unchanged.                                                                         

                                                                                                  

                                                                                                  

                                                                                                  

Historical:                                                                                       

- Allergies: no known allergies;                                                                  

- Home Meds:                                                                                      

1. metformin 500 mg Oral tab 1 tab 2 times per day                                              

     (Last dose: Unknown)                                                                         

2. lisinopril 10 mg Oral tab 1 tab once daily                                                   

     (Last dose: Unknown)                                                                         

3. hydrochlorothiazide 12.5 mg Oral cap 6.25 mg once daily                                      

     (Last dose: Unknown)                                                                         

4. simvastatin 20 mg Oral tab 1 tab once daily                                                  

     (Last dose: Unknown)                                                                         

5. Namenda XR 28 mg oral CSpX 1 cap once daily                                                  

     (Last dose: Unknown)                                                                         

- PMHx: Hypertension; Hypercholesterolemia; Dementia;                                             

- PSHx: Knee surgery- Right;                                                                      

- Social history: Smoking status: Patient states former smoker of tobacco. No barriers            

to communication noted.                                                                         

- : The pt / caregiver states he / she is not on anticoagulants. Home medication list             

is obtained from patients' pharmacy.                                                            

- Exposure Risk Screening:: None identified.                                                      

                                                                                                  

                                                                                                  

Vital Signs:                                                                                      

02/15                                                                                             

11:45 BP 92 / 53 (auto/);                                                                     ml6 

11:45 Pulse 54 MON; Resp 16; Temp 97.4(O); Pulse Ox 98% on R/A; Weight 83.91 kg / 184.99 lbs  ml6 

      (R); Height 5 ft. 9 in. (175.26 cm) (R); Pain 0/10;                                         

12:01 BP 91 / 50 (auto/);                                                                     ml6 

12:01 Pulse 50 MON; Resp 16; Pulse Ox 99% on R/A;                                             ml6 

12:16 BP 99 / 73 (auto/);                                                                     ml6 

12:16 Pulse 57 MON; Resp 16; Pulse Ox 97% on R/A;                                             ml6 

12:23 BP 92 / 54 (auto/);                                                                     ml6 

12:23 Pulse 49 MON; Resp 16; Pulse Ox 98% on R/A; Pain 0/10;                                  ml6 

12:28  / 68 (auto/);                                                                    ml6 

12:28 Pulse 51; Resp 16; Pulse Ox 94% on R/A;                                                 ml6 

12:33  / 57 (auto/);                                                                    ml6 

12:33 Pulse 50; Resp 16; Pulse Ox 98% on R/A;                                                 ml6 

12:38 BP 95 / 51 (auto/);                                                                     ml6 

12:38 Pulse 48; Resp 16; Pulse Ox 97% on R/A;                                                 ml6 

12:58 BP 85 / 53 (auto/);                                                                     ml6 

12:58 Pulse 51 MON; Resp 16; Pulse Ox 97% on R/A;                                             ml6 

13:03 BP 85 / 52 (auto/);                                                                     ml6 

13:03 Pulse 50 MON; Resp 16; Pulse Ox 96% on R/A;                                             ml6 

13:08 BP 88 / 53 (auto/);                                                                     ml6 

13:08 Pulse 50 MON; Resp 16; Pulse Ox 98% on R/A;                                             ml6 

13:13 BP 86 / 52 (auto/);                                                                     ml6 

13:13 Pulse 50 MON; Resp 16; Pulse Ox 98% on R/A; Pain 0/10;                                  ml6 

13:18 BP 74 / 45 (auto/);                                                                     ml6 

13:18 Pulse 52 MON; Resp 16; Pulse Ox 98% on R/A; Pain 0/10;                                  ml6 

13:23 BP 73 / 43 (auto/);                                                                     ml6 

13:23 Pulse 50 MON; Resp 16; Pulse Ox 97% on R/A;                                             ml6 

13:28 BP 73 / 40 (auto/);                                                                     ml6 

13:28 Pulse 50 MON; Resp 16; Pulse Ox 94% on R/A; Pain 0/10;                                  ml6 

13:38 BP 81 / 45 (auto/);                                                                     ml6 

13:38 Pulse 52 MON; Resp 16; Pulse Ox 97% on R/A;                                             ml6 

13:48  / 57 (auto/);                                                                    ml6 

13:48 Pulse 61 MON; Resp 16; Pulse Ox 98% on R/A; Pain 0/10;                                  ml6 

14:30  / 62 (auto/);                                                                    ml6 

16:29  / 65 (auto/);                                                                    sls1

16:31 Pulse 60 MON; Pulse Ox 97% ;                                                            sls1

20:28  / 56 (auto/);                                                                    sls1

20:30 Pulse 52 MON; Pulse Ox 93% ;                                                            sls1

11:45 Body Mass Index 27.32 (83.91 kg, 175.26 cm)                                             ml6 

                                                                                                  

MDM:                                                                                              

11:43 ECG WITH READING ER PHYS+CARDIAG ordered.                                               EDMS

11:43 Cardiac Monitor/Pulse Ox/q 15 min VS ordered.                                           ml6 

11:43 IV Saline Lock ordered.                                                                 ml6 

11:43 Oxygen at 4L/Min NC or Home dosage ordered.                                             ml6 

11:43 Rhythm Strip to chart ordered.                                                          ml6 

11:44 CBC with Diff Ordered.                                                                  EDMS

11:44 Cardiac Injury Profile Ordered.                                                         EDMS

11:44 Liver Profile Ordered.                                                                  EDMS

11:44 MED Profile Ordered.                                                                    EDMS

11:44 Thyroid Stimulating Hormone Ordered.                                                    EDMS

11:44 Troponin Ordered.                                                                       EDMS

11:46 Chest, 1 View Ordered.                                                                  EDMS

12:18 NS 0.9% 500 ml IV at bolus once ordered.                                                br1 

12:18 Atropine 0.5 mg IVP once ordered.                                                       br1 

12:18 CT Head Without Contrast Ordered.                                                       EDMS

12:18 PT/INR Ordered.                                                                         EDMS

12:18 PTT Ordered.                                                                            EDMS

12:19 Knee, Complete Ordered.                                                                 EDMS

12:19 Type & Screen Ordered.                                                                  EDMS

12:38 Financial registration complete.                                                        lg  

12:51 [facility] Payment Agreement was scanned into [employer] and attached to record.               lg  

13:36 CBC with Diff Reviewed.                                                                 br1 

13:36 Cardiac Injury Profile Reviewed.                                                        br1 

13:36 MED Profile Reviewed.                                                                   br1 

13:36 Thyroid Stimulating Hormone Reviewed.                                                   br1 

13:36 Liver Profile Reviewed.                                                                 br1 

13:36 Troponin Reviewed.                                                                      br1 

13:36 Type & Screen Reviewed.                                                                 br1

13:36 PT/INR Reviewed.                                                                        br1 

13:36 PTT Reviewed.                                                                           br1 

13:36 EKG-ADULT Reviewed.                                                                     br1 

13:36 Chest, 1 View Reviewed.                                                                 br1 

13:36 CT Head Without Contrast Reviewed.                                                      br1 

13:38 NS 0.9% 500 ml IV at bolus once ordered.                                                br1 

13:38 BED REQUEST+ADM ordered.                                                                EDMS

14:30 Admission / Observation Status ordered.                                                 EDMS

14:30 CONSISTENT CARBOHYDRATES ordered.                                                       EDMS

14:31 PHYSICAL THERAPY EVAL & TREAT ordered.                                                  EDMS

14:41 FREE T4 Ordered.                                                                        EDMS

14:45 HEMOGLOBIN A1C Ordered.                                                                 EDMS

14:46 MRI Brain W/O FOLL BY WITH Ordered.                                                     EDMS

14:48 URINALYSIS Ordered.                                                                     EDMS

15:12 T-Sheet-- Draft Copy was scanned into [employer] and attached to record.                   gb  

18:15 Fingerstick Blood Sugar Ordered.                                                        EDMS

19:31 COMPLETE BLOOD COUNT Ordered.                                                           EDMS

19:31 BASIC METABOLIC PROFILE Ordered.                                                        EDMS

20:45 Fingerstick Blood Sugar Ordered.                                                        EDMS

                                                                                                  

Administered Medications:                                                                         

12:19 Drug: NS 0.9% 500 ml [sodium chloride 0.9 % intravenous solution] Route: IV; Rate:      ml6 

      bolus; Site: right forearm;                                                                 

13:15 Follow up: IV Status: Completed infusion; Infusion discontinued; IV Intake: 500ml       ml6 

12:27 Drug: Atropine 0.5 mg [atropine 0.1 mg/mL injection syringe (5 mL)] Route: IVP; Site:   ml6 

      right antecubital;                                                                          

13:42 Drug: NS 0.9% 500 ml [sodium chloride 0.9 % intravenous solution] Route: IV; Rate:      ml6 

      bolus; Site: right antecubital;                                                             

16:35 Follow up: IV Status: Completed infusion; Infusion discontinued; IV Intake: 500ml       ml6 

                                                                                                  

                                                                                                  

Signatures:                                                                                       

Dispatcher [employer]                           EDMS                                                 

Ellyn Alonso, RN            RN   daq                                                  

Norma Cooley, Reg                  Reg  gb                                                   

Melisa Sommers, Reg                    Reg  lg                                                   

Antelmo Robbins MD MD   br1                                                  

Lam Gallegos RN RN   ml6                                                  

Josie Rashid RN                     RN   sls1                                                 

                                                                                                  

The chart was reviewed and I authenticate all verbal orders and agree with the evaluation and 
treatment provided.Corrections: (The following items were deleted from the chart)

14:41 14:36 FREE T4 ordered. EDMS                                                             EDMS

14:48 14:41 HEMOGLOBIN A1C ordered. EDMS                                                      EDMS

                                                                                                  

Attachments:                                                                                      

12:51 [facility] Payment Agreement                                                                lg  

15:12 T-Sheet-- Draft Copy                                                                    gb  

                                                                                                  

**************************************************************************************************



*** Chart Complete ***
MTDD

## 2017-02-18 NOTE — EDDOCDS
Physician Documentation                                                                           

                                                                         

Name: Josiah Mayfield                                                                             

Age: 76 yrs                                                                                       

Sex: Male                                                                                         

: 1940                                                                                   

MRN: [id number]                                                                                     

Arrival Date: 02/15/2017                                                                          

Time: 11:33                                                                                       

Account#: [id number]                                                                              

Bed Admit Hold                                                                                    

Private MD: Ryan Gleason M.D.                                                                    

Disposition:                                                                                      

02/15/17 14:13 Hospitalization ordered by Tierney Barrientos for Inpatient Admission. Preliminary           

diagnosis are Gastrointestinal hemorrhage, unspecified, Hypotension,                            

Bradycardia, unspecified, Abrasion, left knee, Abnormal brain scan - likely                     

arachnoid cyst.                                                                                 

- Bed requested for  PCU.                                                                        

- Status is Inpatient Admission.                                                              sls1

- Condition is Stable.                                                                            

- Problem is new.                                                                                 

- Symptoms are unchanged.                                                                         

                                                                                                  

                                                                                                  

                                                                                                  

Historical:                                                                                       

- Allergies: no known allergies;                                                                  

- Home Meds:                                                                                      

1. metformin 500 mg Oral tab 1 tab 2 times per day                                              

     (Last dose: Unknown)                                                                         

2. lisinopril 10 mg Oral tab 1 tab once daily                                                   

     (Last dose: Unknown)                                                                         

3. hydrochlorothiazide 12.5 mg Oral cap 6.25 mg once daily                                      

     (Last dose: Unknown)                                                                         

4. simvastatin 20 mg Oral tab 1 tab once daily                                                  

     (Last dose: Unknown)                                                                         

5. Namenda XR 28 mg oral CSpX 1 cap once daily                                                  

     (Last dose: Unknown)                                                                         

- PMHx: Hypertension; Hypercholesterolemia; Dementia;                                             

- PSHx: Knee surgery- Right;                                                                      

- Social history: Smoking status: Patient states former smoker of tobacco. No barriers            

to communication noted.                                                                         

- : The pt / caregiver states he / she is not on anticoagulants. Home medication list             

is obtained from patients' pharmacy.                                                            

- Exposure Risk Screening:: None identified.                                                      

                                                                                                  

                                                                                                  

Vital Signs:                                                                                      

02/15                                                                                             

11:45 BP 92 / 53 (auto/);                                                                     ml6 

11:45 Pulse 54 MON; Resp 16; Temp 97.4(O); Pulse Ox 98% on R/A; Weight 83.91 kg / 184.99 lbs  ml6 

      (R); Height 5 ft. 9 in. (175.26 cm) (R); Pain 0/10;                                         

12:01 BP 91 / 50 (auto/);                                                                     ml6 

12:01 Pulse 50 MON; Resp 16; Pulse Ox 99% on R/A;                                             ml6 

12:16 BP 99 / 73 (auto/);                                                                     ml6 

12:16 Pulse 57 MON; Resp 16; Pulse Ox 97% on R/A;                                             ml6 

12:23 BP 92 / 54 (auto/);                                                                     ml6 

12:23 Pulse 49 MON; Resp 16; Pulse Ox 98% on R/A; Pain 0/10;                                  ml6 

12:28  / 68 (auto/);                                                                    ml6 

12:28 Pulse 51; Resp 16; Pulse Ox 94% on R/A;                                                 ml6 

12:33  / 57 (auto/);                                                                    ml6 

12:33 Pulse 50; Resp 16; Pulse Ox 98% on R/A;                                                 ml6 

12:38 BP 95 / 51 (auto/);                                                                     ml6 

12:38 Pulse 48; Resp 16; Pulse Ox 97% on R/A;                                                 ml6 

12:58 BP 85 / 53 (auto/);                                                                     ml6 

12:58 Pulse 51 MON; Resp 16; Pulse Ox 97% on R/A;                                             ml6 

13:03 BP 85 / 52 (auto/);                                                                     ml6 

13:03 Pulse 50 MON; Resp 16; Pulse Ox 96% on R/A;                                             ml6 

13:08 BP 88 / 53 (auto/);                                                                     ml6 

13:08 Pulse 50 MON; Resp 16; Pulse Ox 98% on R/A;                                             ml6 

13:13 BP 86 / 52 (auto/);                                                                     ml6 

13:13 Pulse 50 MON; Resp 16; Pulse Ox 98% on R/A; Pain 0/10;                                  ml6 

13:18 BP 74 / 45 (auto/);                                                                     ml6 

13:18 Pulse 52 MON; Resp 16; Pulse Ox 98% on R/A; Pain 0/10;                                  ml6 

13:23 BP 73 / 43 (auto/);                                                                     ml6 

13:23 Pulse 50 MON; Resp 16; Pulse Ox 97% on R/A;                                             ml6 

13:28 BP 73 / 40 (auto/);                                                                     ml6 

13:28 Pulse 50 MON; Resp 16; Pulse Ox 94% on R/A; Pain 0/10;                                  ml6 

13:38 BP 81 / 45 (auto/);                                                                     ml6 

13:38 Pulse 52 MON; Resp 16; Pulse Ox 97% on R/A;                                             ml6 

13:48  / 57 (auto/);                                                                    ml6 

13:48 Pulse 61 MON; Resp 16; Pulse Ox 98% on R/A; Pain 0/10;                                  ml6 

14:30  / 62 (auto/);                                                                    ml6 

16:29  / 65 (auto/);                                                                    sls1

16:31 Pulse 60 MON; Pulse Ox 97% ;                                                            sls1

20:28  / 56 (auto/);                                                                    sls1

20:30 Pulse 52 MON; Pulse Ox 93% ;                                                            sls1

11:45 Body Mass Index 27.32 (83.91 kg, 175.26 cm)                                             ml6 

                                                                                                  

MDM:                                                                                              

11:43 ECG WITH READING ER PHYS+CARDIAG ordered.                                               EDMS

11:43 Cardiac Monitor/Pulse Ox/q 15 min VS ordered.                                           ml6 

11:43 IV Saline Lock ordered.                                                                 ml6 

11:43 Oxygen at 4L/Min NC or Home dosage ordered.                                             ml6 

11:43 Rhythm Strip to chart ordered.                                                          ml6 

11:44 CBC with Diff Ordered.                                                                  EDMS

11:44 Cardiac Injury Profile Ordered.                                                         EDMS

11:44 Liver Profile Ordered.                                                                  EDMS

11:44 MED Profile Ordered.                                                                    EDMS

11:44 Thyroid Stimulating Hormone Ordered.                                                    EDMS

11:44 Troponin Ordered.                                                                       EDMS

11:46 Chest, 1 View Ordered.                                                                  EDMS

12:18 NS 0.9% 500 ml IV at bolus once ordered.                                                br1 

12:18 Atropine 0.5 mg IVP once ordered.                                                       br1 

12:18 CT Head Without Contrast Ordered.                                                       EDMS

12:18 PT/INR Ordered.                                                                         EDMS

12:18 PTT Ordered.                                                                            EDMS

12:19 Knee, Complete Ordered.                                                                 EDMS

12:19 Type & Screen Ordered.                                                                  EDMS

12:38 Financial registration complete.                                                        lg  

12:51 [facility] Payment Agreement was scanned into [employer] and attached to record.               lg  

13:36 CBC with Diff Reviewed.                                                                 br1 

13:36 Cardiac Injury Profile Reviewed.                                                        br1 

13:36 MED Profile Reviewed.                                                                   br1 

13:36 Thyroid Stimulating Hormone Reviewed.                                                   br1 

13:36 Liver Profile Reviewed.                                                                 br1 

13:36 Troponin Reviewed.                                                                      br1 

13:36 Type & Screen Reviewed.                                                                 br1

13:36 PT/INR Reviewed.                                                                        br1 

13:36 PTT Reviewed.                                                                           br1 

13:36 EKG-ADULT Reviewed.                                                                     br1 

13:36 Chest, 1 View Reviewed.                                                                 br1 

13:36 CT Head Without Contrast Reviewed.                                                      br1 

13:38 NS 0.9% 500 ml IV at bolus once ordered.                                                br1 

13:38 BED REQUEST+ADM ordered.                                                                EDMS

14:30 Admission / Observation Status ordered.                                                 EDMS

14:30 CONSISTENT CARBOHYDRATES ordered.                                                       EDMS

14:31 PHYSICAL THERAPY EVAL & TREAT ordered.                                                  EDMS

14:41 FREE T4 Ordered.                                                                        EDMS

14:45 HEMOGLOBIN A1C Ordered.                                                                 EDMS

14:46 MRI Brain W/O FOLL BY WITH Ordered.                                                     EDMS

14:48 URINALYSIS Ordered.                                                                     EDMS

15:12 T-Sheet-- Draft Copy was scanned into [employer] and attached to record.                   gb  

18:15 Fingerstick Blood Sugar Ordered.                                                        EDMS

19:31 COMPLETE BLOOD COUNT Ordered.                                                           EDMS

19:31 BASIC METABOLIC PROFILE Ordered.                                                        EDMS

20:45 Fingerstick Blood Sugar Ordered.                                                        EDMS

                                                                                                  

Administered Medications:                                                                         

12:19 Drug: NS 0.9% 500 ml [sodium chloride 0.9 % intravenous solution] Route: IV; Rate:      ml6 

      bolus; Site: right forearm;                                                                 

13:15 Follow up: IV Status: Completed infusion; Infusion discontinued; IV Intake: 500ml       ml6 

12:27 Drug: Atropine 0.5 mg [atropine 0.1 mg/mL injection syringe (5 mL)] Route: IVP; Site:   ml6 

      right antecubital;                                                                          

13:42 Drug: NS 0.9% 500 ml [sodium chloride 0.9 % intravenous solution] Route: IV; Rate:      ml6 

      bolus; Site: right antecubital;                                                             

16:35 Follow up: IV Status: Completed infusion; Infusion discontinued; IV Intake: 500ml       ml6 

                                                                                                  

                                                                                                  

Signatures:                                                                                       

Dispatcher [employer]                           EDMS                                                 

Ellyn Alonso, RN            RN   daq                                                  

Norma Cooley, Reg                  Reg  gb                                                   

Melisa Sommers, Reg                    Reg  lg                                                   

Antelmo Robbins MD MD   br1                                                  

Lam Gallegos RN RN   ml6                                                  

Josie Rashid RN                     RN   sls1                                                 

                                                                                                  

The chart was reviewed and I authenticate all verbal orders and agree with the evaluation and 
treatment provided.Corrections: (The following items were deleted from the chart)

14:41 14:36 FREE T4 ordered. EDMS                                                             EDMS

14:48 14:41 HEMOGLOBIN A1C ordered. EDMS                                                      EDMS

                                                                                                  

Attachments:                                                                                      

12:51 [facility] Payment Agreement                                                                lg  

15:12 T-Sheet-- Draft Copy                                                                    gb  

                                                                                                  

**************************************************************************************************



*** Chart Complete ***
MTDD

## 2017-02-18 NOTE — IPNPDOC
Text Note


Date of Service


The patient was seen on 2/18/17.





NOTE


Subjective:


Patient is a 76 year old  male with a PMHx of HTN, DLP, DM2, Dementia, 

who presented to the ER with complaints of frequent falls (at least 3) for the 

last 3-4 weeks. He had a recent history of right rib fractures because of a 

fall. He notes that he does not have any LOC or symptoms with the falls. 

Patient denied chest pain or head trauma.





Patient was seen and examined at the bedside. He has no concerns today - 

waiting for test to be performed on Monday. 





Objective:


Vitals (See below)


General: Lying in bed, no acute distress, comfortable, AAOx3


HEENT: NC, AT


CVS: RRR, +S1S2


Lungs: Fair air entry b/l, -e/r/r


Abdomen: Soft, ND, NT, +BSx4


Extremities: +PPx4, - edema, - calf tenderness


Neuro: 5/5 strength in upper / lower extremities bilaterally





Assessment and plan:


1. Frequent falls - possibly 2/2 arachnoid cyst with compression of adjacent 

parietal lobe, possibly 2/2 dehydration 


- Presented with at least 3 falls over 1 month duration


- Physical reveals no focal weakness


- Orthostatic vital signs have been negative; however was hypotensive in ER 


- CT reveals arachnoid cyst; MRI 2/15 revealed 4.4*5.3*4.1 cm arachnoid cyst in 

posterior right parietal lobe


- Discussed with Dr. Patel - will get Cisternogram completed on Monday (

pending arrival of intrathecal contrast)


- Risks and benefits have been expressed to the patient by Dr. Patel 


- Neurology (Dr. Warren) will evaluate today - appreciate their input 


- c/w physical therapy 





2. Vitamin D deficiency 


- c/w Vitamin D supplementation 





2. s/p Acute kidney injury - likely 2/2 pre-renal etiology


- Cr has trended back to normal


- s/p IV fluid hydration 





3. s/p Hypotension - possibly 2/2 dehydration


- s/p IV fluid hydration 





4. DM2


- A1c of 6.3


- c/w ISS





5. DLP


- c/w simvastatin 





6. HTN


- BP well controlled


- c/w Lisinopril 10 mg PO at this time





7. Dementia


- c/w memantine 


 


8. Subclinical hyperthyroidism 





9. DVT prophylaxis


- c/w SCDs





VS,Fishbone, I+O


VS, Fishbone, I+O


 Laboratory Tests


2/18/17 05:46








Calcium Level 8.6 L, Red Blood Count 4.44, Mean Corpuscular Volume 85.6, Mean 

Corpuscular Hemoglobin 29.0, Mean Corpuscular Hemoglobin Concent 33.9, Red Cell 

Distribution Width 12.6








 Vital Signs








  Date Time  Temp Pulse Resp B/P Pulse Ox O2 Delivery O2 Flow Rate FiO2


 


2/18/17 08:46    149/78    


 


2/18/17 06:00  58      





  61      





  70      


 


2/18/17 06:00 97.6  19  97 Room Air  














 I&O- Last 24 Hours up to 6 AM 


 


 2/18/17





 06:00


 


Intake Total 2620 ml


 


Output Total 1775 ml


 


Balance 845 ml














HYACINTH CULVER MD Feb 18, 2017 12:56

## 2017-02-19 VITALS — SYSTOLIC BLOOD PRESSURE: 137 MMHG | DIASTOLIC BLOOD PRESSURE: 73 MMHG

## 2017-02-19 VITALS — DIASTOLIC BLOOD PRESSURE: 78 MMHG | SYSTOLIC BLOOD PRESSURE: 159 MMHG

## 2017-02-19 VITALS — SYSTOLIC BLOOD PRESSURE: 164 MMHG | DIASTOLIC BLOOD PRESSURE: 83 MMHG

## 2017-02-19 LAB
ANION GAP SERPL CALC-SCNC: 7 MEQ/L (ref 8–16)
BUN SERPL-MCNC: 15 MG/DL (ref 7–18)
CALCIUM SERPL-MCNC: 8.5 MG/DL (ref 8.8–10.2)
CHLORIDE SERPL-SCNC: 107 MEQ/L (ref 98–107)
CO2 SERPL-SCNC: 27 MEQ/L (ref 21–32)
CREAT SERPL-MCNC: 0.7 MG/DL (ref 0.7–1.3)
ERYTHROCYTE [DISTWIDTH] IN BLOOD BY AUTOMATED COUNT: 12.5 % (ref 11.5–14.5)
GFR SERPL CREATININE-BSD FRML MDRD: > 60 ML/MIN/{1.73_M2} (ref 42–?)
GLUCOSE SERPL-MCNC: 101 MG/DL (ref 83–110)
MCH RBC QN AUTO: 29.1 PG (ref 27–33)
MCHC RBC AUTO-ENTMCNC: 34.2 G/DL (ref 32–36.5)
MCV RBC AUTO: 85.2 FL (ref 80–96)
PLATELET # BLD AUTO: 212 K/MM3 (ref 150–450)
POTASSIUM SERPL-SCNC: 3.7 MEQ/L (ref 3.5–5.1)
PROT SERPL-MCNC: 6.8 GM/DL (ref 6.4–8.2)
SODIUM SERPL-SCNC: 141 MEQ/L (ref 136–145)
WBC # BLD AUTO: 5.6 K/MM3 (ref 4–10)

## 2017-02-19 RX ADMIN — LISINOPRIL SCH MG: 10 TABLET ORAL at 09:54

## 2017-02-19 RX ADMIN — INSULIN LISPRO SCH UNITS: 100 INJECTION, SOLUTION INTRAVENOUS; SUBCUTANEOUS at 17:33

## 2017-02-19 RX ADMIN — ASPIRIN SCH MG: 81 TABLET ORAL at 09:54

## 2017-02-19 RX ADMIN — INSULIN LISPRO SCH UNITS: 100 INJECTION, SOLUTION INTRAVENOUS; SUBCUTANEOUS at 11:37

## 2017-02-19 RX ADMIN — MULTIPLE VITAMINS W/ MINERALS TAB SCH TAB: TAB at 09:54

## 2017-02-19 RX ADMIN — ACETAMINOPHEN PRN MG: 325 TABLET ORAL at 22:51

## 2017-02-19 RX ADMIN — INSULIN LISPRO SCH UNITS: 100 INJECTION, SOLUTION INTRAVENOUS; SUBCUTANEOUS at 07:30

## 2017-02-19 RX ADMIN — Medication SCH UNITS: at 09:54

## 2017-02-19 RX ADMIN — SIMVASTATIN SCH MG: 20 TABLET, FILM COATED ORAL at 20:23

## 2017-02-19 RX ADMIN — MEMANTINE SCH MG: 5 TABLET ORAL at 20:23

## 2017-02-19 RX ADMIN — DOCUSATE SODIUM SCH MG: 100 CAPSULE, LIQUID FILLED ORAL at 20:23

## 2017-02-19 RX ADMIN — DOCUSATE SODIUM SCH MG: 100 CAPSULE, LIQUID FILLED ORAL at 09:54

## 2017-02-19 RX ADMIN — INSULIN LISPRO SCH UNITS: 100 INJECTION, SOLUTION INTRAVENOUS; SUBCUTANEOUS at 20:25

## 2017-02-19 NOTE — IPNPDOC
Text Note


Date of Service


The patient was seen on 2/19/17.





NOTE


Subjective:


Patient is a 76 year old  male with a PMHx of HTN, DLP, DM2, Dementia, 

who presented to the ER with complaints of frequent falls (at least 3) for the 

last 3-4 weeks. He had a recent history of right rib fractures because of a 

fall. He notes that he does not have any LOC or symptoms with the falls. 

Patient denied chest pain or head trauma.





Patient was seen and examined at the bedside. Patient wants to be taken off of 

fall precautions so that he can walk.





Objective:


Vitals (See below)


General: Lying in bed, no acute distress, comfortable, AAOx3


HEENT: NC, AT


CVS: RRR, +S1S2


Lungs: Fair air entry b/l, -e/r/r


Abdomen: Soft, ND, NT, +BSx4


Extremities: +PPx4, - edema, - calf tenderness





Assessment and plan:


1. Frequent falls - possibly 2/2 arachnoid cyst with compression of adjacent 

parietal lobe, possibly 2/2 dehydration 


- Presented with at least 3 falls over 1 month duration; found to be 

hypotensive in ER


- Physical reveals no focal weakness


- CT reveals arachnoid cyst; MRI 2/15 revealed 4.4*5.3*4.1 cm arachnoid cyst in 

posterior right parietal lobe


- Neurosurgery (Dr. Patel) following - will get Cisternogram completed on 

Monday (pending arrival of intrathecal contrast)


- Risks and benefits have been expressed to the patient by Dr. Patel 


- Neurology (Dr. Warren) to evaluate - appreciate their input 


- c/w physical therapy 





2. Vitamin D deficiency 


- c/w Vitamin D supplementation 





2. s/p Acute kidney injury - likely 2/2 pre-renal etiology


- Cr has trended back to normal


- s/p IV fluid hydration 





3. s/p Hypotension - possibly 2/2 dehydration


- s/p IV fluid hydration 





4. DM2


- A1c of 6.3


- c/w ISS





5. DLP


- c/w simvastatin 





6. HTN


- BP well controlled


- c/w Lisinopril 10 mg PO at this time





7. Dementia


- c/w memantine 


 


8. Subclinical hyperthyroidism 





9. DVT prophylaxis


- c/w SCDs





Disposition: 


- Physical therapy recommends home with prior level of care 


- Advised that we can ambulate with assistance while inpatient





Amandeep LOFTON, I+O


VS, Amandeep I+O


 Laboratory Tests


2/19/17 06:09








Calcium Level 8.5 L, Red Blood Count 4.57, Mean Corpuscular Volume 85.2, Mean 

Corpuscular Hemoglobin 29.1, Mean Corpuscular Hemoglobin Concent 34.2, Red Cell 

Distribution Width 12.5








 Vital Signs








  Date Time  Temp Pulse Resp B/P Pulse Ox O2 Delivery O2 Flow Rate FiO2


 


2/19/17 09:54    158/90    


 


2/19/17 06:00 96.8 59 19  98 Room Air  














 I&O- Last 24 Hours up to 6 AM 


 


 2/19/17





 06:00


 


Intake Total 1830 ml


 


Output Total 2150 ml


 


Balance -320 ml














HYACINTH CULVER MD Feb 19, 2017 13:10

## 2017-02-19 NOTE — CR
DATE OF CONSULTATION:  02/19/2017

 

CONSULTATION REPORT FOR:  Dr. Дмитрий Black

 

REASON FOR CONSULTATION:  Imbalance.

 

HISTORY OF PRESENT ILLNESS:  Mr. Mayfield is a 76-year-old man with a history of

hypertension, diabetes, and dementia who presented to [facility]

due to three or four falls over the last two months.  The patient states that he

tends to go to his left side whenever he walks.  He cannot walk straight.  During

his last fall, he felt that his leg started feeling weak and he fell to the

ground.  He has fallen three or four times over the last two months.  He had a

rib fracture in one of these falls.  He denies any headaches, neck or back pain.

He denies any loss of consciousness, seizures, dysphagia, dysarthria, diplopia or

urinary incontinence.

 

PAST MEDICAL HISTORY:

1.  Type 2 diabetes.

2.  Dyslipidemia.

3.  Hypertension.

4.  Dementia.

 

CURRENT MEDICATIONS:

-  metformin 500 mg by mouth twice a day

-  lisinopril 10 mg by mouth daily

-  hydrochlorothiazide 6.25 mg by mouth daily

-  simvastatin 40 mg by mouth daily

 

SOCIAL HISTORY:  He quit smoking 11 years ago.  He used to smoke one pack per

day.  He drinks one or two beers every week.  He denies illicit drug use.

 

FAMILY HISTORY:  Father had dementia.

 

ALLERGIES:  None.

 

REVIEW OF SYSTEMS:  All systems were reviewed and were found to be

noncontributory except as mentioned in the history present illness.

 

PHYSICAL EXAMINATION:

VITAL SIGNS:  Temperature 97.6, pulse 58, respiratory 19, blood pressure 141/68,

97% saturation on room air.

HEART:  Regular rate and rhythm.

LUNGS:  Clear to auscultation.

ABDOMEN:  Soft, nontender, nondistended.

EXTREMITIES:  No pedal edema.  Peripheral pulses are palpable.

EARS, NOSE, AND THROAT:  Examination is within normal limits.

NEUROLOGIC EXAMINATION:  The patient is awake, alert and oriented to place,

person, month and president's name.  He is unable to tell me the day, date and

year.  His speech is intact with normal comprehension and repetition.

Extraocular muscles are intact.  No facial weakness.  Tongue and uvula are

midline.  He has 5/5 strength in all four extremities.  Deep tendon flexes 2+

throughout.  He has decreased cold, pinprick, vibration sensation in his feet.

His gait is mildly unsteady.  He cannot do tandem walking.  There is no

dysmetria.

 

DIAGNOSTIC STUDIES: His CT scan of head and MRI scan of brain were reviewed and

showed a 4 cm right parietal lobe cyst which most likely represents an arachnoid

cyst which shows post central gyrus without any midline shift. There is no

evidence of normal pressure hydrocephalus.

 

ASSESSMENT:

1. Multifactorial gait difficulty.

2. 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

3. Suspected diabetic peripheral neuropathy.

 

PLAN:

1. Check vitamin B12, serum copper, SPEP to rule out reversible causes,

peripheral neuropathy and imbalance.

 

2. Surgical intervention for right arachnoid cyst and parietal lobe per

discretion of Dr. Patel, his neurosurgeon.  Cystic pleomorphic xanthoastrocytoma

would be a less likely essential diagnosis for his right parietal lobe cyst. I do

not see any evidence of normal pressure hydrocephalus.

 

3. Physical and occupational therapy along with use of his walker. Arachnoid cyst

can affect his cortical sensations, coordination and depth perception. This may

add to his gait difficulty.

 

 

Edited:  02/19/2017, navin

## 2017-02-20 VITALS — DIASTOLIC BLOOD PRESSURE: 75 MMHG | SYSTOLIC BLOOD PRESSURE: 148 MMHG

## 2017-02-20 VITALS — SYSTOLIC BLOOD PRESSURE: 149 MMHG | DIASTOLIC BLOOD PRESSURE: 79 MMHG

## 2017-02-20 VITALS — DIASTOLIC BLOOD PRESSURE: 84 MMHG | SYSTOLIC BLOOD PRESSURE: 164 MMHG

## 2017-02-20 VITALS — SYSTOLIC BLOOD PRESSURE: 149 MMHG | DIASTOLIC BLOOD PRESSURE: 72 MMHG

## 2017-02-20 VITALS — SYSTOLIC BLOOD PRESSURE: 176 MMHG | DIASTOLIC BLOOD PRESSURE: 84 MMHG

## 2017-02-20 LAB
ANION GAP SERPL CALC-SCNC: 5 MEQ/L (ref 8–16)
BUN SERPL-MCNC: 16 MG/DL (ref 7–18)
CALCIUM SERPL-MCNC: 9.1 MG/DL (ref 8.8–10.2)
CHLORIDE SERPL-SCNC: 108 MEQ/L (ref 98–107)
CO2 SERPL-SCNC: 31 MEQ/L (ref 21–32)
CREAT SERPL-MCNC: 0.76 MG/DL (ref 0.7–1.3)
ERYTHROCYTE [DISTWIDTH] IN BLOOD BY AUTOMATED COUNT: 12.6 % (ref 11.5–14.5)
GFR SERPL CREATININE-BSD FRML MDRD: > 60 ML/MIN/{1.73_M2} (ref 42–?)
GLUCOSE SERPL-MCNC: 105 MG/DL (ref 83–110)
MCH RBC QN AUTO: 29.3 PG (ref 27–33)
MCHC RBC AUTO-ENTMCNC: 34.1 G/DL (ref 32–36.5)
MCV RBC AUTO: 85.9 FL (ref 80–96)
PLATELET # BLD AUTO: 218 K/MM3 (ref 150–450)
POTASSIUM SERPL-SCNC: 5.5 MEQ/L (ref 3.5–5.1)
SODIUM SERPL-SCNC: 144 MEQ/L (ref 136–145)
VIT B12 SERPL-MCNC: 465 PG/ML (ref 247–911)
WBC # BLD AUTO: 6.4 K/MM3 (ref 4–10)

## 2017-02-20 PROCEDURE — 3E0R3KZ INTRODUCTION OF OTHER DIAGNOSTIC SUBSTANCE INTO SPINAL CANAL, PERCUTANEOUS APPROACH: ICD-10-PCS | Performed by: INTERNAL MEDICINE

## 2017-02-20 RX ADMIN — ASPIRIN SCH MG: 81 TABLET ORAL at 08:05

## 2017-02-20 RX ADMIN — INSULIN LISPRO SCH UNITS: 100 INJECTION, SOLUTION INTRAVENOUS; SUBCUTANEOUS at 08:04

## 2017-02-20 RX ADMIN — INSULIN LISPRO SCH UNITS: 100 INJECTION, SOLUTION INTRAVENOUS; SUBCUTANEOUS at 13:25

## 2017-02-20 RX ADMIN — ACETAMINOPHEN PRN MG: 325 TABLET ORAL at 23:06

## 2017-02-20 RX ADMIN — LISINOPRIL SCH MG: 10 TABLET ORAL at 08:05

## 2017-02-20 RX ADMIN — INSULIN LISPRO SCH UNITS: 100 INJECTION, SOLUTION INTRAVENOUS; SUBCUTANEOUS at 17:45

## 2017-02-20 RX ADMIN — MEMANTINE SCH MG: 5 TABLET ORAL at 21:21

## 2017-02-20 RX ADMIN — MULTIPLE VITAMINS W/ MINERALS TAB SCH TAB: TAB at 08:05

## 2017-02-20 RX ADMIN — DOCUSATE SODIUM SCH MG: 100 CAPSULE, LIQUID FILLED ORAL at 08:05

## 2017-02-20 RX ADMIN — SIMVASTATIN SCH MG: 20 TABLET, FILM COATED ORAL at 21:22

## 2017-02-20 RX ADMIN — Medication SCH UNITS: at 08:05

## 2017-02-20 RX ADMIN — INSULIN LISPRO SCH UNITS: 100 INJECTION, SOLUTION INTRAVENOUS; SUBCUTANEOUS at 21:00

## 2017-02-20 RX ADMIN — DOCUSATE SODIUM SCH MG: 100 CAPSULE, LIQUID FILLED ORAL at 21:22

## 2017-02-20 NOTE — IPNPDOC
Text Note


Date of Service


The patient was seen on 2/20/17.





NOTE


Subjective:


Patient is a 76 year old  male with a PMHx of HTN, DLP, DM2, Dementia, 

who presented to the ER with complaints of frequent falls (at least 3) for the 

last 3-4 weeks. He had a recent history of right rib fractures because of a 

fall. He notes that he does not have any LOC or symptoms with the falls. 

Patient denied chest pain or head trauma.





Patient was seen and examined at the bedside. Patient has no problems this 

morning.





Objective:


Vitals (See below)


General: Lying in bed, no acute distress, comfortable, AAOx3


HEENT: NC, AT


CVS: RRR, +S1S2


Lungs: Fair air entry b/l, -e/r/r


Abdomen: Soft, ND, NT, +BSx4


Extremities: +PPx4, - edema, - calf tenderness





Assessment and plan:


1. Frequent falls - possibly 2/2 arachnoid cyst with compression of adjacent 

parietal lobe, possibly 2/2 dehydration 


- Presented with at least 3 falls over 1 month duration; found to be 

hypotensive in ER


- Physical reveals no focal weakness


- CT reveals arachnoid cyst; MRI 2/15 revealed 4.4*5.3*4.1 cm arachnoid cyst in 

posterior right parietal lobe


- Cisternogram will be started today 


- Risks and benefits have been expressed to the patient by Dr. Patel 


- Neurosurgery (Dr. Patel) following


- c/w physical therapy 





2. Dementia - possibly Alzheimer's


- Evaluated by neurology


- Neurology (Dr. Warren) following - evaluating for reversible causes of dementia





3. Vitamin D deficiency 


- c/w Vitamin D supplementation 





4. s/p Acute kidney injury - likely 2/2 pre-renal etiology


- Cr has trended back to normal


- s/p IV fluid hydration 





5. s/p Hypotension - possibly 2/2 dehydration


- s/p IV fluid hydration 





6. DM2


- A1c of 6.3


- c/w ISS





7. DLP


- c/w simvastatin 





8. HTN


- BP uncontrolled 


- Will increase Lisinopril to 40 mg PO at this time; with holding parameters





9. Dementia


- c/w memantine 


 


10. Subclinical hyperthyroidism 





11. DVT prophylaxis


- c/w SCDs





Disposition: 


- Awaiting results of cisternogram to see if contrast does not drain from cyst 

as per Dr. Patel


- Based on findings may require intervention





VS,Fishbone, I+O


VS, Fishbone, I+O


 Laboratory Tests


2/20/17 05:47








Calcium Level 9.1, Red Blood Count 4.50, Mean Corpuscular Volume 85.9, Mean 

Corpuscular Hemoglobin 29.3, Mean Corpuscular Hemoglobin Concent 34.1, Red Cell 

Distribution Width 12.6








 Vital Signs








  Date Time  Temp Pulse Resp B/P Pulse Ox O2 Delivery O2 Flow Rate FiO2


 


2/20/17 13:00 96.8 60 20 176/84 97 Room Air  














 I&O- Last 24 Hours up to 6 AM 


 


 2/20/17





 06:00


 


Intake Total 2640 ml


 


Output Total 1100 ml


 


Balance 1540 ml














HYACINTH CULVER MD Feb 20, 2017 13:30

## 2017-02-20 NOTE — REP
Procedure:

For guidance for lumbar puncture.

 

History:

Unsteady gait, dementia

 

The procedure was performed under the direct supervision of Dr. Salcido.

 

The risks and benefits of the procedure were explained and informed consent was

obtained by the healthcare proxy.  The L3-4 interspace was localized using

fluoroscopic guidance.  The skin was prepped and draped in a sterile fashion.  1%

lidocaine was used as a local anesthetic.  Using fluoroscopic guidance a 22-gauge

spinal needle was inserted and advanced into the thecal sac.  12 ml of spinal

fluid was withdrawn and sent to lab.

 

The the patient tolerated the procedure well and there were no immediate

complications.

 

22 seconds of fluoro time was utilized for this procedure.

 

 

Reviewed by

ELLI Layne 02/20/2017 04:25 PSigned by

Angus Salcido MD 02/20/2017 04:28 P

## 2017-02-21 VITALS — DIASTOLIC BLOOD PRESSURE: 62 MMHG | SYSTOLIC BLOOD PRESSURE: 125 MMHG

## 2017-02-21 VITALS — SYSTOLIC BLOOD PRESSURE: 134 MMHG | DIASTOLIC BLOOD PRESSURE: 70 MMHG

## 2017-02-21 VITALS — SYSTOLIC BLOOD PRESSURE: 149 MMHG | DIASTOLIC BLOOD PRESSURE: 72 MMHG

## 2017-02-21 VITALS — SYSTOLIC BLOOD PRESSURE: 145 MMHG | DIASTOLIC BLOOD PRESSURE: 82 MMHG

## 2017-02-21 LAB
ALBUMIN MFR UR ELPH: 59.6 % (ref 55.8–66.1)
ALBUMIN SERPL-MCNC: 4.05 GM/DL (ref 3.29–5.55)
ANION GAP SERPL CALC-SCNC: 7 MEQ/L (ref 8–16)
BUN SERPL-MCNC: 14 MG/DL (ref 7–18)
CALCIUM SERPL-MCNC: 8.4 MG/DL (ref 8.8–10.2)
CHLORIDE SERPL-SCNC: 106 MEQ/L (ref 98–107)
CO2 SERPL-SCNC: 30 MEQ/L (ref 21–32)
CREAT SERPL-MCNC: 0.78 MG/DL (ref 0.7–1.3)
ERYTHROCYTE [DISTWIDTH] IN BLOOD BY AUTOMATED COUNT: 12.5 % (ref 11.5–14.5)
GAMMA GLOB 24H MFR UR ELPH: 13.3 % (ref 11.1–18.8)
GFR SERPL CREATININE-BSD FRML MDRD: > 60 ML/MIN/{1.73_M2} (ref 42–?)
GLUCOSE SERPL-MCNC: 100 MG/DL (ref 83–110)
MCH RBC QN AUTO: 28.9 PG (ref 27–33)
MCHC RBC AUTO-ENTMCNC: 34 G/DL (ref 32–36.5)
MCV RBC AUTO: 85.1 FL (ref 80–96)
PLATELET # BLD AUTO: 221 K/MM3 (ref 150–450)
POTASSIUM SERPL-SCNC: 3.9 MEQ/L (ref 3.5–5.1)
SODIUM SERPL-SCNC: 143 MEQ/L (ref 136–145)
WBC # BLD AUTO: 6 K/MM3 (ref 4–10)

## 2017-02-21 RX ADMIN — SIMVASTATIN SCH MG: 20 TABLET, FILM COATED ORAL at 20:34

## 2017-02-21 RX ADMIN — DOCUSATE SODIUM SCH MG: 100 CAPSULE, LIQUID FILLED ORAL at 20:34

## 2017-02-21 RX ADMIN — INSULIN LISPRO SCH UNITS: 100 INJECTION, SOLUTION INTRAVENOUS; SUBCUTANEOUS at 17:24

## 2017-02-21 RX ADMIN — MEMANTINE SCH MG: 5 TABLET ORAL at 20:34

## 2017-02-21 RX ADMIN — INSULIN LISPRO SCH UNITS: 100 INJECTION, SOLUTION INTRAVENOUS; SUBCUTANEOUS at 20:21

## 2017-02-21 RX ADMIN — ASPIRIN SCH MG: 81 TABLET ORAL at 10:15

## 2017-02-21 RX ADMIN — Medication SCH UNITS: at 10:15

## 2017-02-21 RX ADMIN — INSULIN LISPRO SCH UNITS: 100 INJECTION, SOLUTION INTRAVENOUS; SUBCUTANEOUS at 12:35

## 2017-02-21 RX ADMIN — LISINOPRIL SCH MG: 40 TABLET ORAL at 10:15

## 2017-02-21 RX ADMIN — INSULIN LISPRO SCH UNITS: 100 INJECTION, SOLUTION INTRAVENOUS; SUBCUTANEOUS at 07:30

## 2017-02-21 RX ADMIN — MULTIPLE VITAMINS W/ MINERALS TAB SCH TAB: TAB at 10:15

## 2017-02-21 RX ADMIN — DOCUSATE SODIUM SCH MG: 100 CAPSULE, LIQUID FILLED ORAL at 10:15

## 2017-02-21 RX ADMIN — ACETAMINOPHEN PRN MG: 325 TABLET ORAL at 23:31

## 2017-02-21 NOTE — IPNPDOC
Subjective


Date Seen


The patient was seen on 2/21/17.





Subjective


Chief Complaint/HPI


The patient is a 76-year-old male admitted with a reason for visit of FALL.


General:  Denies: Chills, Night Sweats


Constitutional:  Denies: Chills, Fever


Eyes:  Denies: Pain, Vision change


ENT:  Denies: Ear Pain, Head Aches


Skin:  Denies: Lesions, Rash


Pulmonary:  Denies: Cough, Dyspnea


Cardiovascular:  Denies: Chest Pain, Palpitations


Gastrointestinal:  Denies: Nausea, Vomiting


Hematologic:  Denies: Bleeding Excessively, Bruising


Musculoskeletal:  Denies: Back Pain, Neck Pain





Objective


Physical Examination


General Exam:  Positive: Alert, Cooperative, No Acute Distress


ENT Exam:  Positive: Atraumatic, Mucous membr. moist/pink


Neck Exam:  Negative: JVD


Chest Exam:  Positive: Clear to auscultation, Normal air movement


Heart Exam:  Positive: Normal S1, Normal S2, Rate Normal


Abdomen Exam:  Positive: Soft, 


   Negative: Tenderness


Extremity Exam:  Negative: Swelling, Tenderness


Neuro Exam:  Positive: Strength at 5/5 X4 ext





Assessment /Plan


Plan/VTE


VTE Prophylaxis Ordered?:  Yes





Plan


1. Frequent falls - possibly 2/2 arachnoid cyst with compression of adjacent 

parietal lobe versus dehydration 


CT Head revealed an arachnoid cyst; MRI 2/15 revealed 4.4 x 5.3 x 4.1 cm 

arachnoid cyst in posterior right parietal lobe


Patient with no acute focal neurological deficits


Cisternogram pending 


Dr. Patel of Neurosurgery 


Cleared by physical therapy


Will f/u with Cisternogram results and NeuroSx recommends 





2. Dementia - possibly Alzheimer's


Dr. Warren of Neurology on board for reversible causes of dementia





3. Vitamin D deficiency 


c/w Vitamin D supplementation 





4. s/p Acute kidney injury - likely 2/2 pre-renal etiology, resolved





5. s/p Hypotension - possibly 2/2 dehydration


s/p IV fluid hydration 





6. DM2


A1c of 6.3


c/w ISS





7. DLP


c/w simvastatin 





8. HTN


Cont Current Regimen





9. Dementia


c/w memantine 


 


10. Subclinical hyperthyroidism 





11. DVT prophylaxis


c/w SCDs





Dispo: We will f/u with Cisternogram results and NeuroSx recommendations





VS, I&O, 24H, Fishbone


Vital Signs/I&O





 Vital Signs








  Date Time  Temp Pulse Resp B/P Pulse Ox O2 Delivery O2 Flow Rate FiO2


 


2/21/17 10:08  63  134/70    


 


2/21/17 06:00 96.6  18  96 Room Air  














 I&O- Last 24 Hours up to 6 AM 


 


 2/21/17





 06:00


 


Intake Total 1800 ml


 


Output Total 300 ml


 


Balance 1500 ml











Laboratory Data


24H LABS


 Laboratory Tests 2


2/20/17 16:35: Bedside Glucose (Misc Panel) 108


2/20/17 20:55: Bedside Glucose (Misc Panel) 126H


2/21/17 05:41: 


Anion Gap 7L, Blood Urea Nitrogen 14, Creatinine 0.78, Sodium Level 143, 

Potassium Level 3.9#, Chloride Level 106, Carbon Dioxide Level 30, Calcium 

Level 8.4L, Glomerular Filtration Rate > 60.0


2/21/17 11:53: Bedside Glucose (Misc Panel) 104


CBC/BMP


 Laboratory Tests


2/21/17 05:41








Calcium Level 8.4 L, Red Blood Count 4.34, Mean Corpuscular Volume 85.1, Mean 

Corpuscular Hemoglobin 28.9, Mean Corpuscular Hemoglobin Concent 34.0, Red Cell 

Distribution Width 12.5








ESHA SMALLWOOD MD Feb 21, 2017 14:08

## 2017-02-22 VITALS — DIASTOLIC BLOOD PRESSURE: 64 MMHG | SYSTOLIC BLOOD PRESSURE: 118 MMHG

## 2017-02-22 VITALS — DIASTOLIC BLOOD PRESSURE: 70 MMHG | SYSTOLIC BLOOD PRESSURE: 145 MMHG

## 2017-02-22 VITALS — DIASTOLIC BLOOD PRESSURE: 66 MMHG | SYSTOLIC BLOOD PRESSURE: 137 MMHG

## 2017-02-22 LAB
ANION GAP SERPL CALC-SCNC: 7 MEQ/L (ref 8–16)
BUN SERPL-MCNC: 21 MG/DL (ref 7–18)
CALCIUM SERPL-MCNC: 8.3 MG/DL (ref 8.8–10.2)
CHLORIDE SERPL-SCNC: 106 MEQ/L (ref 98–107)
CO2 SERPL-SCNC: 27 MEQ/L (ref 21–32)
CREAT SERPL-MCNC: 0.72 MG/DL (ref 0.7–1.3)
ERYTHROCYTE [DISTWIDTH] IN BLOOD BY AUTOMATED COUNT: 13.1 % (ref 11.5–14.5)
GFR SERPL CREATININE-BSD FRML MDRD: > 60 ML/MIN/{1.73_M2} (ref 42–?)
GLUCOSE SERPL-MCNC: 98 MG/DL (ref 83–110)
MCH RBC QN AUTO: 29.6 PG (ref 27–33)
MCHC RBC AUTO-ENTMCNC: 34.3 G/DL (ref 32–36.5)
MCV RBC AUTO: 86.3 FL (ref 80–96)
PLATELET # BLD AUTO: 223 K/MM3 (ref 150–450)
POTASSIUM SERPL-SCNC: 3.9 MEQ/L (ref 3.5–5.1)
SODIUM SERPL-SCNC: 140 MEQ/L (ref 136–145)
WBC # BLD AUTO: 5.9 K/MM3 (ref 4–10)

## 2017-02-22 RX ADMIN — INSULIN LISPRO SCH UNITS: 100 INJECTION, SOLUTION INTRAVENOUS; SUBCUTANEOUS at 12:16

## 2017-02-22 RX ADMIN — ACETAMINOPHEN PRN MG: 325 TABLET ORAL at 23:20

## 2017-02-22 RX ADMIN — INSULIN LISPRO SCH UNITS: 100 INJECTION, SOLUTION INTRAVENOUS; SUBCUTANEOUS at 21:00

## 2017-02-22 RX ADMIN — DOCUSATE SODIUM SCH MG: 100 CAPSULE, LIQUID FILLED ORAL at 12:15

## 2017-02-22 RX ADMIN — LISINOPRIL SCH MG: 40 TABLET ORAL at 12:15

## 2017-02-22 RX ADMIN — MULTIPLE VITAMINS W/ MINERALS TAB SCH TAB: TAB at 12:15

## 2017-02-22 RX ADMIN — INSULIN LISPRO SCH UNITS: 100 INJECTION, SOLUTION INTRAVENOUS; SUBCUTANEOUS at 07:30

## 2017-02-22 RX ADMIN — DOCUSATE SODIUM SCH MG: 100 CAPSULE, LIQUID FILLED ORAL at 20:35

## 2017-02-22 RX ADMIN — INSULIN LISPRO SCH UNITS: 100 INJECTION, SOLUTION INTRAVENOUS; SUBCUTANEOUS at 17:20

## 2017-02-22 RX ADMIN — Medication SCH UNITS: at 12:15

## 2017-02-22 RX ADMIN — MEMANTINE SCH MG: 5 TABLET ORAL at 20:35

## 2017-02-22 RX ADMIN — ASPIRIN SCH MG: 81 TABLET ORAL at 12:15

## 2017-02-22 RX ADMIN — SIMVASTATIN SCH MG: 20 TABLET, FILM COATED ORAL at 20:34

## 2017-02-22 NOTE — EEG
DATE OF EE2017

 

REFERRING PHYSICIAN:  Дмитрий Black MD

 

DIAGNOSIS:  Recent onset dementia, rule out seizures.

 

EEG NUMBER:  [id number].

 

HISTORY:  Patient is a 76-year-old man who was admitted at [facility] due to recent falls and history of dementia.  This EEG was done to rule

out epileptic potential.  His current medication list was not provided.

 

TECHNICAL DESCRIPTION:  This digital EEG was recorded by 21 scalp, ear and two

EKG electrodes and was reviewed in bipolar and referential montages following

reformatting in 10-20 international electrode placement system.

 

INTERPRETATION:  The patient was noted to be in awake and drowsy states during

this EEG.  Resting awake background consisted of 7-8 Hz theta activity measuring

15-40 microvolts in amplitude.  Stage 1 and 2 sleep was reviewed and was

symmetric bilaterally.  Hyperventilation and photic stimulation remained

unremarkable.  EKG revealed normal sinus rhythm.  No focal, lateralizing or

epileptiform abnormalities were seen.  No clinical or electrographic seizures

were recorded.

 

CONCLUSION:  This EEG in awake, drowsy states, stage 1 and 2 sleep is mildly

abnormal due to presence of mild generalized slowing and disorganization of

background consistent with nonspecific diffuse cerebral dysfunction such as seen

in dementia and encephalopathy due to multiple potential causes.  No epileptiform

abnormalities were seen.  Clinical correlation is recommended.

## 2017-02-22 NOTE — IPNPDOC
Subjective


Date Seen


The patient was seen on 2/22/17.





Subjective


Chief Complaint/HPI


The patient is a 76-year-old male admitted with a reason for visit of FALL.


General:  Denies: Chills, Night Sweats


Constitutional:  Denies: Chills, Fever


Eyes:  Denies: Pain, Vision change


ENT:  Denies: Ear Pain, Head Aches


Skin:  Denies: Lesions, Rash


Pulmonary:  Denies: Cough, Dyspnea


Cardiovascular:  Denies: Chest Pain, Palpitations


Gastrointestinal:  Denies: Nausea, Vomiting


Genitourinary:  Denies: Dysuria, Frequency


Hematologic:  Denies: Bleeding Excessively, Bruising





Objective


Physical Examination


General Exam:  Positive: Alert, Cooperative, No Acute Distress


ENT Exam:  Positive: Atraumatic, Mucous membr. moist/pink


Neck Exam:  Negative: JVD


Chest Exam:  Positive: Clear to auscultation, Normal air movement


Heart Exam:  Positive: Normal S1, Normal S2, Rate Normal


Abdomen Exam:  Positive: Soft, 


   Negative: Tenderness


Extremity Exam:  Negative: Swelling, Tenderness


Neuro Exam:  Positive: Strength at 5/5 X4 ext





Assessment /Plan


Plan/VTE


VTE Prophylaxis Ordered?:  Yes





Plan


1. Frequent falls - possibly 2/2 arachnoid cyst with compression of adjacent 

parietal lobe versus dehydration 


CT Head revealed an arachnoid cyst; MRI 2/15 revealed 4.4 x 5.3 x 4.1 cm 

arachnoid cyst in posterior right parietal lobe


Patient with no acute focal neurological deficits


Cisternogram pending 


Dr. Patel of Neurosurgery 


Cleared by physical therapy


Will f/u with Cisternogram results and NeuroSx recommends 





2. Dementia - possibly Alzheimer's


Dr. Warren of Neurology on board for reversible causes of dementia





3. Vitamin D deficiency 


c/w Vitamin D supplementation 





4. s/p Acute kidney injury - likely 2/2 pre-renal etiology, resolved





5. s/p Hypotension - possibly 2/2 dehydration


s/p IV fluid hydration 





6. DM2


A1c of 6.3


c/w ISS





7. DLP


c/w simvastatin 





8. HTN


Cont Current Regimen





9. Dementia


c/w memantine 


 


10. Subclinical hyperthyroidism 





11. DVT prophylaxis


c/w SCDs





Dispo: We will f/u with Cisternogram results and NeuroSx recommendations





VS, I&O, 24H, Fishbone


Vital Signs/I&O





 Vital Signs








  Date Time  Temp Pulse Resp B/P Pulse Ox O2 Delivery O2 Flow Rate FiO2


 


2/22/17 06:00 97.1 67 15 137/66 97 Room Air  














 I&O- Last 24 Hours up to 6 AM 


 


 2/22/17





 06:00


 


Intake Total 1560 ml


 


Output Total 525 ml


 


Balance 1035 ml











Laboratory Data


24H LABS


 Laboratory Tests 2


2/21/17 16:48: Bedside Glucose (Misc Panel) 111H


2/21/17 20:19: Bedside Glucose (Misc Panel) 118H


2/22/17 06:08: 


Anion Gap 7L, Blood Urea Nitrogen 21H, Creatinine 0.72, Sodium Level 140, 

Potassium Level 3.9, Chloride Level 106, Carbon Dioxide Level 27, Calcium Level 

8.3L, Glomerular Filtration Rate > 60.0


2/22/17 11:45: Bedside Glucose (Misc Panel) 107


CBC/BMP


 Laboratory Tests


2/22/17 06:08








Calcium Level 8.3 L, Red Blood Count 4.30, Mean Corpuscular Volume 86.3, Mean 

Corpuscular Hemoglobin 29.6, Mean Corpuscular Hemoglobin Concent 34.3, Red Cell 

Distribution Width 13.1








ESHA SMALLWOOD MD Feb 22, 2017 14:20

## 2017-02-23 VITALS — SYSTOLIC BLOOD PRESSURE: 128 MMHG | DIASTOLIC BLOOD PRESSURE: 86 MMHG

## 2017-02-23 VITALS — DIASTOLIC BLOOD PRESSURE: 60 MMHG | SYSTOLIC BLOOD PRESSURE: 127 MMHG

## 2017-02-23 VITALS — SYSTOLIC BLOOD PRESSURE: 133 MMHG | DIASTOLIC BLOOD PRESSURE: 69 MMHG

## 2017-02-23 RX ADMIN — INSULIN LISPRO SCH UNITS: 100 INJECTION, SOLUTION INTRAVENOUS; SUBCUTANEOUS at 21:00

## 2017-02-23 RX ADMIN — INSULIN LISPRO SCH UNITS: 100 INJECTION, SOLUTION INTRAVENOUS; SUBCUTANEOUS at 09:40

## 2017-02-23 RX ADMIN — Medication SCH UNITS: at 09:39

## 2017-02-23 RX ADMIN — MEMANTINE SCH MG: 5 TABLET ORAL at 21:33

## 2017-02-23 RX ADMIN — ASPIRIN SCH MG: 81 TABLET ORAL at 09:39

## 2017-02-23 RX ADMIN — LISINOPRIL SCH MG: 40 TABLET ORAL at 09:40

## 2017-02-23 RX ADMIN — MULTIPLE VITAMINS W/ MINERALS TAB SCH TAB: TAB at 09:39

## 2017-02-23 RX ADMIN — DOCUSATE SODIUM SCH MG: 100 CAPSULE, LIQUID FILLED ORAL at 21:33

## 2017-02-23 RX ADMIN — SIMVASTATIN SCH MG: 20 TABLET, FILM COATED ORAL at 21:33

## 2017-02-23 RX ADMIN — INSULIN LISPRO SCH UNITS: 100 INJECTION, SOLUTION INTRAVENOUS; SUBCUTANEOUS at 12:41

## 2017-02-23 RX ADMIN — INSULIN LISPRO SCH UNITS: 100 INJECTION, SOLUTION INTRAVENOUS; SUBCUTANEOUS at 17:44

## 2017-02-23 RX ADMIN — DOCUSATE SODIUM SCH MG: 100 CAPSULE, LIQUID FILLED ORAL at 09:39

## 2017-02-23 NOTE — REP
NUCLEAR CISTERNOGRAM:  02/23/2017

 

CLINICAL HISTORY:  Frontoparietal cystic lesion right hemisphere of the brain,

evaluate for communication with cerebral spinal fluid.

 

TECHNIQUE:  The patient had fluoroscopic guidance for tracer injection via a

22-gauge spinal needle placed at the L3-4 level on 02/20/2017.  Injection of

0.895 mCi Indium-111 DTPA intrathecally via that lumbar puncture. 1-hour spine

images, 6-hour static images of the brain anterior, posterior, and bilateral

projections of 24- and 48-hour static images, and a final 72 hour set of static

images of the brain.

 

Tracer distribution was normal into the ventricular system by 6 hours with

activity extending over the convexities at 24, 48 and 72 hours.  There is a

photopenic zone in the right hemisphere in the posterior frontal anterior

parietal region correspond to the cyst.  There is no accumulation of tracer in

this region of the cyst.

 

IMPRESSION:

 

The intraparenchymal cyst in the right hemisphere does not communicate to the

cerebral spinal fluid with no tracer activity reaching it out to 72 hours.

Normal progression of the tracer through the spine into the ventricular system

and around the convexities without other finding present.

 

 

Signed by

Matthias Gallardo MD 02/23/2017 01:22 P

## 2017-02-23 NOTE — IPNPDOC
Subjective


Date Seen


The patient was seen on 2/23/17.





Subjective


Chief Complaint/HPI


The patient is a 76-year-old male admitted with a reason for visit of FALL.


General:  Denies: Chills, Night Sweats


Constitutional:  Denies: Chills, Fever


Eyes:  Denies: Pain, Vision change


ENT:  Denies: Ear Pain, Head Aches


Skin:  Denies: Lesions, Rash


Pulmonary:  Denies: Cough, Dyspnea


Cardiovascular:  Denies: Chest Pain, Palpitations


Gastrointestinal:  Denies: Abdominal Pain, Nausea, Vomiting


Genitourinary:  Denies: Dysuria, Frequency


Hematologic:  Denies: Bleeding Excessively, Bruising


Endocrine:  Denies: Polydipsia, Polyphagia





Objective


Physical Examination


General Exam:  Positive: Alert, Cooperative, No Acute Distress


ENT Exam:  Positive: Atraumatic, Mucous membr. moist/pink


Neck Exam:  Negative: JVD


Chest Exam:  Positive: Clear to auscultation, Normal air movement


Heart Exam:  Positive: Normal S1, Normal S2, Rate Normal


Abdomen Exam:  Positive: Soft, 


   Negative: Tenderness


Extremity Exam:  Negative: Swelling, Tenderness


Neuro Exam:  Positive: Strength at 5/5 X4 ext





Assessment /Plan


Plan/VTE


VTE Prophylaxis Ordered?:  Yes





Plan


1. Frequent falls - possibly 2/2 arachnoid cyst with compression of adjacent 

parietal lobe versus dehydration 


CT Head revealed an arachnoid cyst; MRI 2/15 revealed 4.4 x 5.3 x 4.1 cm 

arachnoid cyst in posterior right parietal lobe


Patient with no acute focal neurological deficits


Cisternogram pending 


Dr. Patel of Neurosurgery 


Cleared by physical therapy


Will f/u with Cisternogram results and NeuroSx recommends 





2. Dementia - possibly Alzheimer's


Dr. Warren of Neurology on board for reversible causes of dementia





3. Vitamin D deficiency 


c/w Vitamin D supplementation 





4. s/p Acute kidney injury - likely 2/2 pre-renal etiology, resolved





5. s/p Hypotension - possibly 2/2 dehydration


s/p IV fluid hydration 





6. DM2


A1c of 6.3


c/w ISS





7. DLP


c/w simvastatin 





8. HTN


Cont Current Regimen





9. Dementia


c/w memantine 


 


10. Subclinical hyperthyroidism 





11. DVT prophylaxis


c/w SCDs





Dispo: We will f/u with Cisternogram results and NeuroSx recommendations





VS, I&O, 24H, Fishbone


Vital Signs/I&O





 Vital Signs








  Date Time  Temp Pulse Resp B/P Pulse Ox O2 Delivery O2 Flow Rate FiO2


 


2/23/17 06:00 96.9 78 17 128/86 98 Room Air  














 I&O- Last 24 Hours up to 6 AM 


 


 2/23/17





 06:00


 


Intake Total 1800 ml


 


Output Total 700 ml


 


Balance 1100 ml











Laboratory Data


24H LABS


 Laboratory Tests 2


2/22/17 11:45: Bedside Glucose (Misc Panel) 107


2/22/17 16:47: Bedside Glucose (Misc Panel) 150H


2/22/17 20:42: Bedside Glucose (Misc Panel) 109


2/23/17 05:36: Bedside Glucose (Misc Panel) 107








ESHA SMALLWOOD MD Feb 23, 2017 10:18

## 2017-02-24 VITALS — SYSTOLIC BLOOD PRESSURE: 152 MMHG | DIASTOLIC BLOOD PRESSURE: 79 MMHG

## 2017-02-24 VITALS — SYSTOLIC BLOOD PRESSURE: 150 MMHG | DIASTOLIC BLOOD PRESSURE: 62 MMHG

## 2017-02-24 VITALS — SYSTOLIC BLOOD PRESSURE: 131 MMHG | DIASTOLIC BLOOD PRESSURE: 74 MMHG

## 2017-02-24 RX ADMIN — LISINOPRIL SCH MG: 40 TABLET ORAL at 08:08

## 2017-02-24 RX ADMIN — INSULIN LISPRO SCH UNITS: 100 INJECTION, SOLUTION INTRAVENOUS; SUBCUTANEOUS at 08:08

## 2017-02-24 RX ADMIN — ASPIRIN SCH MG: 81 TABLET ORAL at 08:08

## 2017-02-24 RX ADMIN — MULTIPLE VITAMINS W/ MINERALS TAB SCH TAB: TAB at 08:08

## 2017-02-24 RX ADMIN — SIMVASTATIN SCH MG: 20 TABLET, FILM COATED ORAL at 21:11

## 2017-02-24 RX ADMIN — INSULIN LISPRO SCH UNITS: 100 INJECTION, SOLUTION INTRAVENOUS; SUBCUTANEOUS at 17:26

## 2017-02-24 RX ADMIN — DOCUSATE SODIUM SCH MG: 100 CAPSULE, LIQUID FILLED ORAL at 08:08

## 2017-02-24 RX ADMIN — Medication SCH UNITS: at 08:08

## 2017-02-24 RX ADMIN — DOCUSATE SODIUM SCH MG: 100 CAPSULE, LIQUID FILLED ORAL at 21:11

## 2017-02-24 RX ADMIN — INSULIN LISPRO SCH UNITS: 100 INJECTION, SOLUTION INTRAVENOUS; SUBCUTANEOUS at 21:00

## 2017-02-24 RX ADMIN — MEMANTINE SCH MG: 5 TABLET ORAL at 21:11

## 2017-02-24 RX ADMIN — INSULIN LISPRO SCH UNITS: 100 INJECTION, SOLUTION INTRAVENOUS; SUBCUTANEOUS at 12:49

## 2017-02-24 RX ADMIN — ACETAMINOPHEN PRN MG: 325 TABLET ORAL at 00:08

## 2017-02-24 RX ADMIN — ACETAMINOPHEN PRN MG: 325 TABLET ORAL at 23:50

## 2017-02-24 NOTE — IPNPDOC
Subjective


Date Seen


The patient was seen on 2/24/17.





Subjective


Chief Complaint/HPI


The patient is a 76-year-old male admitted with a reason for visit of FALL.


General:  Denies: Chills, Night Sweats


Constitutional:  Denies: Chills, Fever


Eyes:  Denies: Pain, Vision change


ENT:  Denies: Ear Pain, Head Aches


Skin:  Denies: Lesions, Rash


Pulmonary:  Denies: Cough, Dyspnea


Cardiovascular:  Denies: Chest Pain, Palpitations


Gastrointestinal:  Denies: Nausea, Vomiting


Genitourinary:  Denies: Dysuria, Frequency


Hematologic:  Denies: Bleeding Excessively, Bruising





Objective


Physical Examination


General Exam:  Positive: Alert, Cooperative, No Acute Distress


ENT Exam:  Positive: Atraumatic, Mucous membr. moist/pink


Neck Exam:  Negative: JVD


Chest Exam:  Positive: Clear to auscultation, Normal air movement


Heart Exam:  Positive: Normal S1, Normal S2, Rate Normal


Abdomen Exam:  Positive: Soft, 


   Negative: Tenderness


Extremity Exam:  Negative: Swelling, Tenderness


Neuro Exam:  Positive: Strength at 5/5 X4 ext





Assessment /Plan


Plan/VTE


VTE Prophylaxis Ordered?:  Yes





Plan


1. Frequent falls - possibly 2/2 arachnoid cyst with compression of adjacent 

parietal lobe versus dehydration 


CT Head revealed an arachnoid cyst; MRI 2/15 revealed 4.4 x 5.3 x 4.1 cm 

arachnoid cyst in posterior right parietal lobe


Patient with no acute focal neurological deficits


Cisternogram results noted 


Dr. Patel of Neurosurgery on board


Cisternogram results noted, surgical intervention for Cystic Mass planned for 

Monday 2/27 as per Dr. Patel of NeuroSx





2. Dementia - possibly Alzheimer's


Dr. Warren of Neurology on board for reversible causes of dementia





3. Vitamin D deficiency 


c/w Vitamin D supplementation 





4. s/p Acute kidney injury - likely 2/2 pre-renal etiology, resolved





5. s/p Hypotension - possibly 2/2 dehydration


s/p IV fluid hydration 





6. DM2


A1c of 6.3


c/w ISS





7. DLP


c/w simvastatin 





8. HTN


Cont Current Regimen





9. Dementia


c/w memantine 


 


10. Subclinical hyperthyroidism 





11. DVT prophylaxis


c/w SCDs





Dispo: Patient scheduled for NeuroSx intervention for Cystic Mass after review 

of Cisternogram results and discussion with Patient + Family as per Dr. Patel





VS, I&O, 24H, Fishbone


Vital Signs/I&O





 Vital Signs








  Date Time  Temp Pulse Resp B/P Pulse Ox O2 Delivery O2 Flow Rate FiO2


 


2/24/17 06:00 96.9 66 15 131/74 98 Room Air  














 I&O- Last 24 Hours up to 6 AM 


 


 2/24/17





 06:00


 


Intake Total 3120 ml


 


Output Total 1050 ml


 


Balance 2070 ml











Laboratory Data


24H LABS


 Laboratory Tests 2


2/23/17 16:44: Bedside Glucose (Misc Panel) 198H


2/23/17 20:41: Bedside Glucose (Misc Panel) 84


2/24/17 06:07: Bedside Glucose (Misc Panel) 107


2/24/17 11:26: Bedside Glucose (Misc Panel) 113H








ESHA SMALLWOOD MD Feb 24, 2017 14:16

## 2017-02-25 VITALS — DIASTOLIC BLOOD PRESSURE: 70 MMHG | SYSTOLIC BLOOD PRESSURE: 136 MMHG

## 2017-02-25 VITALS — SYSTOLIC BLOOD PRESSURE: 155 MMHG | DIASTOLIC BLOOD PRESSURE: 76 MMHG

## 2017-02-25 VITALS — DIASTOLIC BLOOD PRESSURE: 69 MMHG | SYSTOLIC BLOOD PRESSURE: 144 MMHG

## 2017-02-25 LAB
CLOSURE TME COLL+ADP BLD: 118 SECONDS (ref 56–103)
ERYTHROCYTE [DISTWIDTH] IN BLOOD BY AUTOMATED COUNT: 13.1 % (ref 11.5–14.5)
MCH RBC QN AUTO: 28.9 PG (ref 27–33)
MCHC RBC AUTO-ENTMCNC: 33.8 G/DL (ref 32–36.5)
MCV RBC AUTO: 85.7 FL (ref 80–96)
PLATELET # BLD AUTO: 222 K/MM3 (ref 150–450)
WBC # BLD AUTO: 5.7 K/MM3 (ref 4–10)

## 2017-02-25 RX ADMIN — DOCUSATE SODIUM SCH MG: 100 CAPSULE, LIQUID FILLED ORAL at 09:04

## 2017-02-25 RX ADMIN — LISINOPRIL SCH MG: 40 TABLET ORAL at 09:04

## 2017-02-25 RX ADMIN — INSULIN LISPRO SCH UNITS: 100 INJECTION, SOLUTION INTRAVENOUS; SUBCUTANEOUS at 12:14

## 2017-02-25 RX ADMIN — INSULIN LISPRO SCH UNITS: 100 INJECTION, SOLUTION INTRAVENOUS; SUBCUTANEOUS at 21:02

## 2017-02-25 RX ADMIN — DOCUSATE SODIUM SCH MG: 100 CAPSULE, LIQUID FILLED ORAL at 21:02

## 2017-02-25 RX ADMIN — INSULIN LISPRO SCH UNITS: 100 INJECTION, SOLUTION INTRAVENOUS; SUBCUTANEOUS at 17:43

## 2017-02-25 RX ADMIN — ACETAMINOPHEN PRN MG: 325 TABLET ORAL at 23:59

## 2017-02-25 RX ADMIN — SIMVASTATIN SCH MG: 20 TABLET, FILM COATED ORAL at 21:02

## 2017-02-25 RX ADMIN — Medication SCH UNITS: at 09:04

## 2017-02-25 RX ADMIN — MEMANTINE SCH MG: 5 TABLET ORAL at 21:01

## 2017-02-25 RX ADMIN — INSULIN LISPRO SCH UNITS: 100 INJECTION, SOLUTION INTRAVENOUS; SUBCUTANEOUS at 07:40

## 2017-02-25 NOTE — IPNPDOC
Subjective


Date Seen


The patient was seen on 2/25/17.





Subjective


Chief Complaint/HPI


The patient is a 76-year-old male admitted with a reason for visit of FALL.


General:  Denies: Chills, Night Sweats


Constitutional:  Denies: Chills, Fever


Eyes:  Denies: Pain, Vision change


ENT:  Denies: Ear Pain, Head Aches


Skin:  Denies: Lesions, Rash


Pulmonary:  Denies: Dyspnea


Cardiovascular:  Denies: Chest Pain, Palpitations


Gastrointestinal:  Denies: Nausea


Genitourinary:  Denies: Dysuria, Frequency


Hematologic:  Denies: Bleeding Excessively, Bruising





Objective


Physical Examination


General Exam:  Positive: Alert, Cooperative, No Acute Distress


ENT Exam:  Positive: Atraumatic, Mucous membr. moist/pink


Neck Exam:  Negative: JVD


Chest Exam:  Positive: Clear to auscultation, Normal air movement


Heart Exam:  Positive: Normal S1, Normal S2, Rate Normal


Abdomen Exam:  Positive: Soft, 


   Negative: Tenderness


Extremity Exam:  Negative: Swelling, Tenderness


Neuro Exam:  Positive: Strength at 5/5 X4 ext





Assessment /Plan


Plan/VTE


VTE Prophylaxis Ordered?:  Yes





Plan


1. Frequent falls - possibly 2/2 arachnoid cyst with compression of adjacent 

parietal lobe versus dehydration 


CT Head revealed an arachnoid cyst; MRI 2/15 revealed 4.4 x 5.3 x 4.1 cm 

arachnoid cyst in posterior right parietal lobe


Patient with no acute focal neurological deficits


Dr. Patel of Neurosurgery on board


Cisternogram results noted, surgical intervention for Cystic Mass planned for 

Monday 2/27 as per Dr. Patel of NeuroSx





2. Dementia - possibly Alzheimer's


Dr. Warren of Neurology on board for reversible causes of dementia





3. Vitamin D deficiency 


c/w Vitamin D supplementation 





4. s/p Acute kidney injury - likely 2/2 pre-renal etiology, resolved





5. s/p Hypotension - possibly 2/2 dehydration


s/p IV fluid hydration 





6. DM2


A1c of 6.3


c/w ISS





7. DLP


c/w simvastatin 





8. HTN


Cont Current Regimen





9. Dementia


c/w memantine 


 


10. Subclinical hyperthyroidism 





11. DVT prophylaxis


c/w SCDs





Dispo: Patient scheduled for NeuroSx intervention for Cystic Mass after review 

of Cisternogram results and discussion with Patient + Family as per Dr. Patel





VS, I&O, 24H, Fishbone


Vital Signs/I&O





 Vital Signs








  Date Time  Temp Pulse Resp B/P Pulse Ox O2 Delivery O2 Flow Rate FiO2


 


2/25/17 06:00 96.6 54 17 155/76 95 Room Air  














 I&O- Last 24 Hours up to 6 AM 


 


 2/25/17





 06:00


 


Intake Total 2040 ml


 


Output Total 1400 ml


 


Balance 640 ml











Laboratory Data


24H LABS


 Laboratory Tests 2


2/24/17 11:26: Bedside Glucose (Misc Panel) 113H


2/24/17 17:00: Bedside Glucose (Misc Panel) 120H


2/24/17 20:39: Bedside Glucose (Misc Panel) 102


2/25/17 05:52: Bedside Glucose (Misc Panel) 97


2/25/17 07:31: 


Activated Partial Thromboplast Time 29.1, Platelet Func Collagen/Epinephrine > 

300H, Platelet Function Collagen/ADP 118H


CBC/BMP


 Laboratory Tests


2/25/17 07:31








Red Blood Count 4.47, Mean Corpuscular Volume 85.7, Mean Corpuscular Hemoglobin 

28.9, Mean Corpuscular Hemoglobin Concent 33.8, Red Cell Distribution Width 13.1








ESHA SMALLWOOD MD Feb 25, 2017 10:06

## 2017-02-26 VITALS — DIASTOLIC BLOOD PRESSURE: 62 MMHG | SYSTOLIC BLOOD PRESSURE: 134 MMHG

## 2017-02-26 VITALS — SYSTOLIC BLOOD PRESSURE: 125 MMHG | DIASTOLIC BLOOD PRESSURE: 70 MMHG

## 2017-02-26 VITALS — DIASTOLIC BLOOD PRESSURE: 73 MMHG | SYSTOLIC BLOOD PRESSURE: 136 MMHG

## 2017-02-26 LAB
ANION GAP SERPL CALC-SCNC: 7 MEQ/L (ref 8–16)
BUN SERPL-MCNC: 25 MG/DL (ref 7–18)
CALCIUM SERPL-MCNC: 8.2 MG/DL (ref 8.8–10.2)
CHLORIDE SERPL-SCNC: 107 MEQ/L (ref 98–107)
CO2 SERPL-SCNC: 28 MEQ/L (ref 21–32)
CREAT SERPL-MCNC: 0.77 MG/DL (ref 0.7–1.3)
ERYTHROCYTE [DISTWIDTH] IN BLOOD BY AUTOMATED COUNT: 13.1 % (ref 11.5–14.5)
GFR SERPL CREATININE-BSD FRML MDRD: > 60 ML/MIN/{1.73_M2} (ref 42–?)
GLUCOSE SERPL-MCNC: 100 MG/DL (ref 83–110)
MCH RBC QN AUTO: 28.8 PG (ref 27–33)
MCHC RBC AUTO-ENTMCNC: 33.4 G/DL (ref 32–36.5)
MCV RBC AUTO: 86.3 FL (ref 80–96)
PLATELET # BLD AUTO: 219 K/MM3 (ref 150–450)
POTASSIUM SERPL-SCNC: 3.8 MEQ/L (ref 3.5–5.1)
SODIUM SERPL-SCNC: 142 MEQ/L (ref 136–145)
WBC # BLD AUTO: 6.1 K/MM3 (ref 4–10)

## 2017-02-26 RX ADMIN — MEMANTINE SCH MG: 5 TABLET ORAL at 20:02

## 2017-02-26 RX ADMIN — INSULIN LISPRO SCH UNITS: 100 INJECTION, SOLUTION INTRAVENOUS; SUBCUTANEOUS at 17:10

## 2017-02-26 RX ADMIN — INSULIN LISPRO SCH UNITS: 100 INJECTION, SOLUTION INTRAVENOUS; SUBCUTANEOUS at 07:30

## 2017-02-26 RX ADMIN — INSULIN LISPRO SCH UNITS: 100 INJECTION, SOLUTION INTRAVENOUS; SUBCUTANEOUS at 20:59

## 2017-02-26 RX ADMIN — SIMVASTATIN SCH MG: 20 TABLET, FILM COATED ORAL at 20:02

## 2017-02-26 RX ADMIN — ACETAMINOPHEN PRN MG: 325 TABLET ORAL at 23:52

## 2017-02-26 RX ADMIN — DOCUSATE SODIUM SCH MG: 100 CAPSULE, LIQUID FILLED ORAL at 20:02

## 2017-02-26 RX ADMIN — LISINOPRIL SCH MG: 40 TABLET ORAL at 08:52

## 2017-02-26 RX ADMIN — DOCUSATE SODIUM SCH MG: 100 CAPSULE, LIQUID FILLED ORAL at 08:52

## 2017-02-26 RX ADMIN — INSULIN LISPRO SCH UNITS: 100 INJECTION, SOLUTION INTRAVENOUS; SUBCUTANEOUS at 11:50

## 2017-02-26 RX ADMIN — Medication SCH UNITS: at 08:52

## 2017-02-26 NOTE — IPNPDOC
Subjective


Date Seen


The patient was seen on 2/26/17.





Subjective


Chief Complaint/HPI


The patient is a 76-year-old male admitted with a reason for visit of FALL.


General:  Denies: Chills, Night Sweats


Constitutional:  Denies: Chills, Fever


Eyes:  Denies: Pain, Vision change


ENT:  Denies: Ear Pain, Head Aches


Skin:  Denies: Lesions, Rash


Pulmonary:  Denies: Cough, Dyspnea


Cardiovascular:  Denies: Chest Pain, Palpitations


Gastrointestinal:  Denies: Nausea, Vomiting


Genitourinary:  Denies: Dysuria, Frequency


Hematologic:  Denies: Bleeding Excessively, Bruising





Objective


Physical Examination


General Exam:  Positive: Alert, Cooperative, No Acute Distress


ENT Exam:  Positive: Atraumatic, Mucous membr. moist/pink


Neck Exam:  Negative: JVD


Chest Exam:  Positive: Clear to auscultation, Normal air movement


Heart Exam:  Positive: Normal S1, Normal S2, Rate Normal


Abdomen Exam:  Positive: Soft, 


   Negative: Tenderness


Extremity Exam:  Negative: Swelling, Tenderness


Neuro Exam:  Positive: Strength at 5/5 X4 ext





Assessment /Plan


Plan/VTE


VTE Prophylaxis Ordered?:  Yes





Plan


1. Frequent falls - possibly 2/2 arachnoid cyst with compression of adjacent 

parietal lobe versus dehydration 


CT Head revealed an arachnoid cyst; MRI 2/15 revealed 4.4 x 5.3 x 4.1 cm 

arachnoid cyst in posterior right parietal lobe


Patient with no acute focal neurological deficits


Dr. Patel of Neurosurgery on board


Cisternogram results noted, surgical intervention for Cystic Mass planned for 

Monday 2/27 as per Dr. Patel of NeuroSx


NPO for procedure in the AM


EKG from admission reviewed, notable for Left Ventricular Hypertrophy, no acute 

ST changes


The patient able to tolerate 4 METs of activity, and has been ambulating in the 

corridors here in the hospital with no acute complaints of chest pain/SOB while 

also being monitored on Telemetry 


Revised Cardiac Risk Index of 0, Class 1 Risk (0.4% of Major Cardiac Event) 


Risk Factors Discussed with Dr. Avalos of Anesthesia


Patient medically optimized for surgery, risks and benefits of surgery 

discussed between Dr. Patel and Patient/Family





2. Dementia - possibly Alzheimer's


Dr. Warren of Neurology on board for reversible causes of dementia





3. Vitamin D deficiency 


c/w Vitamin D supplementation 





4. s/p Acute kidney injury - likely 2/2 pre-renal etiology, resolved





5. s/p Hypotension - possibly 2/2 dehydration


s/p IV fluid hydration 





6. DM2


A1c of 6.3


c/w ISS





7. DLP


c/w simvastatin 





8. HTN


Cont Current Regimen





9. Dementia


c/w memantine 


 


10. Subclinical hyperthyroidism 





11. DVT prophylaxis


c/w SCDs





Dispo: Patient scheduled for NeuroSx intervention for Cystic Mass after review 

of Cisternogram results and discussion with Patient + Family as per Dr. Patel





VS, I&O, 24H, Fishbone


Vital Signs/I&O





 Vital Signs








  Date Time  Temp Pulse Resp B/P Pulse Ox O2 Delivery O2 Flow Rate FiO2


 


2/26/17 06:00 97.4 60 20 132/62 98   


 


2/25/17 14:00      Room Air  














 I&O- Last 24 Hours up to 6 AM 


 


 2/26/17





 06:00


 


Intake Total 1960 ml


 


Output Total 1550 ml


 


Balance 410 ml











Laboratory Data


24H LABS


 Laboratory Tests 2


2/25/17 11:21: Bedside Glucose (Misc Panel) 126H


2/25/17 16:27: Bedside Glucose (Misc Panel) 128H


2/25/17 21:00: Bedside Glucose (Misc Panel) 117H


2/26/17 05:39: 


Anion Gap 7L, Blood Urea Nitrogen 25H, Creatinine 0.77, Sodium Level 142, 

Potassium Level 3.8, Chloride Level 107, Carbon Dioxide Level 28, Calcium Level 

8.2L, Glomerular Filtration Rate > 60.0


CBC/BMP


 Laboratory Tests


2/26/17 05:39








Calcium Level 8.2 L, Red Blood Count 4.30, Mean Corpuscular Volume 86.3, Mean 

Corpuscular Hemoglobin 28.8, Mean Corpuscular Hemoglobin Concent 33.4, Red Cell 

Distribution Width 13.1








ESHA SMALLWOOD MD Feb 26, 2017 11:22

## 2017-02-27 VITALS — SYSTOLIC BLOOD PRESSURE: 132 MMHG | DIASTOLIC BLOOD PRESSURE: 69 MMHG

## 2017-02-27 VITALS — SYSTOLIC BLOOD PRESSURE: 131 MMHG | DIASTOLIC BLOOD PRESSURE: 66 MMHG

## 2017-02-27 VITALS — DIASTOLIC BLOOD PRESSURE: 77 MMHG | SYSTOLIC BLOOD PRESSURE: 146 MMHG

## 2017-02-27 VITALS — DIASTOLIC BLOOD PRESSURE: 61 MMHG | SYSTOLIC BLOOD PRESSURE: 120 MMHG

## 2017-02-27 VITALS — SYSTOLIC BLOOD PRESSURE: 116 MMHG | DIASTOLIC BLOOD PRESSURE: 58 MMHG

## 2017-02-27 VITALS — DIASTOLIC BLOOD PRESSURE: 64 MMHG | SYSTOLIC BLOOD PRESSURE: 136 MMHG

## 2017-02-27 LAB
ANION GAP SERPL CALC-SCNC: 7 MEQ/L (ref 8–16)
BASOPHILS # BLD AUTO: 0 K/MM3 (ref 0–0.2)
BASOPHILS NFR BLD AUTO: 0.7 % (ref 0–1)
BUN SERPL-MCNC: 27 MG/DL (ref 7–18)
CALCIUM SERPL-MCNC: 7.9 MG/DL (ref 8.8–10.2)
CHLORIDE SERPL-SCNC: 106 MEQ/L (ref 98–107)
CLOSURE TME COLL+ADP BLD: 103 SECONDS (ref 56–103)
CLOSURE TME COLL+ADP BLD: 114 SECONDS (ref 56–103)
CO2 SERPL-SCNC: 28 MEQ/L (ref 21–32)
CREAT SERPL-MCNC: 0.7 MG/DL (ref 0.7–1.3)
DIFF SLIDE NUMBER: 50
EOSINOPHIL # BLD AUTO: 0.2 K/MM3 (ref 0–0.5)
EOSINOPHIL NFR BLD AUTO: 5.1 % (ref 0–3)
ERYTHROCYTE [DISTWIDTH] IN BLOOD BY AUTOMATED COUNT: 13.2 % (ref 11.5–14.5)
GFR SERPL CREATININE-BSD FRML MDRD: > 60 ML/MIN/{1.73_M2} (ref 42–?)
GLUCOSE SERPL-MCNC: 105 MG/DL (ref 83–110)
INR PPP: 0.94
LARGE UNSTAINED CELL #: 0.1 K/MM3 (ref 0–0.4)
LARGE UNSTAINED CELL %: 2.7 % (ref 0–4)
LYMPHOCYTES # BLD AUTO: 1.3 K/MM3 (ref 1.5–4.5)
LYMPHOCYTES NFR BLD AUTO: 26.4 % (ref 24–44)
MCH RBC QN AUTO: 28.6 PG (ref 27–33)
MCHC RBC AUTO-ENTMCNC: 33.1 G/DL (ref 32–36.5)
MCV RBC AUTO: 86.4 FL (ref 80–96)
MONOCYTES # BLD AUTO: 0.3 K/MM3 (ref 0–0.8)
MONOCYTES NFR BLD AUTO: 6.1 % (ref 0–5)
NEUTROPHILS # BLD AUTO: 3 K/MM3 (ref 1.8–7.7)
NEUTROPHILS NFR BLD AUTO: 59.1 % (ref 36–66)
PLATELET # BLD AUTO: 208 K/MM3 (ref 150–450)
POTASSIUM SERPL-SCNC: 3.5 MEQ/L (ref 3.5–5.1)
SODIUM SERPL-SCNC: 141 MEQ/L (ref 136–145)
WBC # BLD AUTO: 5.9 K/MM3 (ref 4–10)

## 2017-02-27 RX ADMIN — Medication SCH UNITS: at 08:31

## 2017-02-27 RX ADMIN — INSULIN LISPRO SCH UNITS: 100 INJECTION, SOLUTION INTRAVENOUS; SUBCUTANEOUS at 05:44

## 2017-02-27 RX ADMIN — DOCUSATE SODIUM SCH MG: 100 CAPSULE, LIQUID FILLED ORAL at 20:48

## 2017-02-27 RX ADMIN — INSULIN LISPRO SCH UNITS: 100 INJECTION, SOLUTION INTRAVENOUS; SUBCUTANEOUS at 00:27

## 2017-02-27 RX ADMIN — INSULIN LISPRO SCH UNITS: 100 INJECTION, SOLUTION INTRAVENOUS; SUBCUTANEOUS at 17:55

## 2017-02-27 RX ADMIN — INSULIN LISPRO SCH UNITS: 100 INJECTION, SOLUTION INTRAVENOUS; SUBCUTANEOUS at 12:00

## 2017-02-27 RX ADMIN — DEXTROSE MONOHYDRATE SCH MLS/HR: 50 INJECTION, SOLUTION INTRAVENOUS at 00:27

## 2017-02-27 RX ADMIN — INSULIN LISPRO SCH UNITS: 100 INJECTION, SOLUTION INTRAVENOUS; SUBCUTANEOUS at 20:44

## 2017-02-27 RX ADMIN — DOCUSATE SODIUM SCH MG: 100 CAPSULE, LIQUID FILLED ORAL at 08:31

## 2017-02-27 RX ADMIN — SIMVASTATIN SCH MG: 20 TABLET, FILM COATED ORAL at 20:48

## 2017-02-27 RX ADMIN — DEXTROSE MONOHYDRATE SCH MLS/HR: 50 INJECTION, SOLUTION INTRAVENOUS at 10:00

## 2017-02-27 RX ADMIN — MEMANTINE SCH MG: 5 TABLET ORAL at 20:48

## 2017-02-27 NOTE — REP
MR BRAIN WITHOUT CONTRAST:

 

HISTORY: Intracranial cyst.

 

COMPARISON: 02/15/2017

 

A cyst is present in the posterior right parietal lobe.  The cyst measures 4.4 cm

in transverse by 5.3 cm in AP dimensions.

 

IMPRESSION:

 

MR of the brain was performed for use with the Stealth navigation system.

 

 

Signed by

Angus Salcido MD 02/27/2017 09:10 A

## 2017-02-27 NOTE — IPNPDOC
Subjective


Date Seen


The patient was seen on 2/27/17.





Subjective


Chief Complaint/HPI


The patient is a 76-year-old male admitted with a reason for visit of FALL.


General:  Denies: Chills, Night Sweats


Constitutional:  Denies: Chills, Fever


Eyes:  Denies: Pain, Vision change


ENT:  Denies: Ear Pain, Head Aches


Skin:  Denies: Lesions, Rash


Pulmonary:  Denies: Cough, Dyspnea


Cardiovascular:  Denies: Chest Pain, Palpitations


Gastrointestinal:  Denies: Nausea, Vomiting


Genitourinary:  Denies: Dysuria, Frequency


Hematologic:  Denies: Bleeding Excessively, Bruising





Objective


Physical Examination


General Exam:  Positive: Alert, Cooperative, No Acute Distress


ENT Exam:  Positive: Atraumatic, Mucous membr. moist/pink


Neck Exam:  Negative: JVD


Chest Exam:  Positive: Clear to auscultation, Normal air movement


Heart Exam:  Positive: Normal S1, Normal S2, Rate Normal


Abdomen Exam:  Positive: Soft, 


   Negative: Tenderness


Extremity Exam:  Negative: Swelling, Tenderness


Neuro Exam:  Positive: Strength at 5/5 X4 ext





Assessment /Plan


Plan/VTE


VTE Prophylaxis Ordered?:  Yes





Plan


1. Frequent falls - possibly 2/2 arachnoid cyst with compression of adjacent 

parietal lobe versus dehydration 


CT Head revealed an arachnoid cyst; MRI 2/15 revealed 4.4 x 5.3 x 4.1 cm 

arachnoid cyst in posterior right parietal lobe


Patient with no acute focal neurological deficits


Dr. Patel of Neurosurgery on board


Cisternogram results noted


Patient scheduled for Frameless Stealth Based Craniotomy today as per NeuroSx





2. Dementia - possibly Alzheimer's


Dr. Warren of Neurology on board for reversible causes of dementia





3. Vitamin D deficiency 


c/w Vitamin D supplementation 





4. s/p Acute kidney injury - likely 2/2 pre-renal etiology, resolved





5. s/p Hypotension - possibly 2/2 dehydration


s/p IV fluid hydration 





6. DM2


A1c of 6.3


c/w ISS





7. DLP


c/w simvastatin 





8. HTN


Cont Current Regimen





9. Dementia


c/w memantine 


 


10. Subclinical hyperthyroidism 





11. DVT prophylaxis


c/w SCDs





Dispo: Patient scheduled for NeuroSx intervention for Cystic Mass after review 

of Cisternogram results and discussion with Patient + Family as per Dr. Patel





VS, I&O, 24H, Fishbone


Vital Signs/I&O





 Vital Signs








  Date Time  Temp Pulse Resp B/P Pulse Ox O2 Delivery O2 Flow Rate FiO2


 


2/27/17 06:00 96.7 61 18 132/69 97   


 


2/26/17 14:00      Room Air  














 I&O- Last 24 Hours up to 6 AM 


 


 2/27/17





 06:00


 


Intake Total 2780 ml


 


Output Total 1675 ml


 


Balance 1105 ml











Laboratory Data


24H LABS


 Laboratory Tests 2


2/26/17 11:42: Bedside Glucose (Misc Panel) 90


2/26/17 16:35: Bedside Glucose (Misc Panel) 96


2/26/17 20:23: Bedside Glucose (Misc Panel) 123H


2/27/17 00:13: Bedside Glucose (Misc Panel) 104


2/27/17 05:27: Bedside Glucose (Misc Panel) 108


2/27/17 06:10: 


Activated Partial Thromboplast Time 28.3, Anion Gap 7L, Basophils # (Auto) 0.0, 

Basophils (%) (Auto) 0.7, Blood Urea Nitrogen 27H, Creatinine 0.70, Sodium 

Level 141, Potassium Level 3.5, Chloride Level 106, Carbon Dioxide Level 28, 

Calcium Level 7.9L, Eosinophils # (Auto) 0.2, Eosinophils (%) (Auto) 5.1H, 

Glomerular Filtration Rate > 60.0, Large Unclassified Cells # 0.1, Large 

Unclassified Cells % 2.7, Lymphocytes # (Auto) 1.3L, Lymphocytes (%) (Auto) 26.4

, Monocytes # (Auto) 0.3, Monocytes (%) (Auto) 6.1H, Neutrophils # (Auto) 3.0, 

Neutrophils (%) (Auto) 59.1, Platelet Estimate NORMAL, Platelet Func Collagen/

Epinephrine 199H, Platelet Function Collagen/, Prothromb Time 

International Ratio 0.94, Prothrombin Time 12.7


CBC/BMP


 Laboratory Tests


2/27/17 06:10








Calcium Level 7.9 L, Red Blood Count 4.34, Mean Corpuscular Volume 86.4, Mean 

Corpuscular Hemoglobin 28.6, Mean Corpuscular Hemoglobin Concent 33.1, Red Cell 

Distribution Width 13.2








ESHA SMALLWOOD MD Feb 27, 2017 09:53

## 2017-02-27 NOTE — IPN
DATE: 02/27/2017

 

The patient is a 76-year-old male. He is accompanied by his daughter today, Suyapa.

The patient is alert. He is oriented to person and place. He has some difficulty

with the year but with help he is able to identify correctly. He is able to name

six out of seven of his children. He is able to follow verbal commands but still

with some mild confusion. There are no noted changes in his confusion state. He

has no new symptoms or findings today. He was scheduled for a surgery today. This

has been postponed due to acute changes on his EEG. In addition, his platelet

function is abnormally high.

 

Vitals are stable. The patient has been afebrile. His cerebrospinal fluid (CSF)

is still pending. As previously mentioned, platelet function is abnormally high.

Otherwise, laboratories have been stable. We will repeat platelet function in 48

hours. If platelet function has normalized, we will potentially move forward with

surgery pending further clearance. Dr. Patel was present for this round.

## 2017-02-28 VITALS — DIASTOLIC BLOOD PRESSURE: 65 MMHG | SYSTOLIC BLOOD PRESSURE: 134 MMHG

## 2017-02-28 VITALS — DIASTOLIC BLOOD PRESSURE: 75 MMHG | SYSTOLIC BLOOD PRESSURE: 150 MMHG

## 2017-02-28 VITALS — DIASTOLIC BLOOD PRESSURE: 67 MMHG | SYSTOLIC BLOOD PRESSURE: 138 MMHG

## 2017-02-28 LAB
ANION GAP SERPL CALC-SCNC: 8 MEQ/L (ref 8–16)
BUN SERPL-MCNC: 19 MG/DL (ref 7–18)
CALCIUM SERPL-MCNC: 8.4 MG/DL (ref 8.8–10.2)
CHLORIDE SERPL-SCNC: 106 MEQ/L (ref 98–107)
CO2 SERPL-SCNC: 28 MEQ/L (ref 21–32)
CREAT SERPL-MCNC: 0.65 MG/DL (ref 0.7–1.3)
ERYTHROCYTE [DISTWIDTH] IN BLOOD BY AUTOMATED COUNT: 13.4 % (ref 11.5–14.5)
GFR SERPL CREATININE-BSD FRML MDRD: > 60 ML/MIN/{1.73_M2} (ref 42–?)
GLUCOSE SERPL-MCNC: 100 MG/DL (ref 83–110)
MCH RBC QN AUTO: 29.1 PG (ref 27–33)
MCHC RBC AUTO-ENTMCNC: 33.4 G/DL (ref 32–36.5)
MCV RBC AUTO: 87.1 FL (ref 80–96)
PLATELET # BLD AUTO: 211 K/MM3 (ref 150–450)
POTASSIUM SERPL-SCNC: 4 MEQ/L (ref 3.5–5.1)
SODIUM SERPL-SCNC: 142 MEQ/L (ref 136–145)
WBC # BLD AUTO: 6.4 K/MM3 (ref 4–10)

## 2017-02-28 RX ADMIN — INSULIN LISPRO SCH UNITS: 100 INJECTION, SOLUTION INTRAVENOUS; SUBCUTANEOUS at 12:31

## 2017-02-28 RX ADMIN — Medication SCH UNITS: at 07:35

## 2017-02-28 RX ADMIN — SIMVASTATIN SCH MG: 20 TABLET, FILM COATED ORAL at 20:14

## 2017-02-28 RX ADMIN — DOCUSATE SODIUM SCH MG: 100 CAPSULE, LIQUID FILLED ORAL at 20:14

## 2017-02-28 RX ADMIN — INSULIN LISPRO SCH UNITS: 100 INJECTION, SOLUTION INTRAVENOUS; SUBCUTANEOUS at 07:30

## 2017-02-28 RX ADMIN — DOCUSATE SODIUM SCH MG: 100 CAPSULE, LIQUID FILLED ORAL at 07:35

## 2017-02-28 RX ADMIN — MEMANTINE SCH MG: 5 TABLET ORAL at 20:14

## 2017-02-28 RX ADMIN — LISINOPRIL SCH MG: 40 TABLET ORAL at 09:07

## 2017-02-28 NOTE — IPN
DATE:  02/28/2017

 

Patient is resting comfortably in his bed when I approach him. He is alert and

oriented times three. He is participating in the conversation appropriately. He

does not have any new symptoms or findings. He states he is eating well, and he

did sleep well last night. Patient does remember seeing me yesterday, but he did

not remember my name, which can be expected. Regarding his lab work, it appears

that the platelet function did improve a little bit, although it is still high.

Discussed this with Dr. Patel, and we will be sure to make sure the platelet

function is ordered prior to his surgery to recheck this. Patient does not have

any further questions. Dr. Patel was present for this round.

## 2017-02-28 NOTE — IPNPDOC
Text Note


Date of Service


The patient was seen on 2/28/17.





NOTE


Subjective:


Patient is a 76 year old  male with a PMHx of HTN, DLP, DM2, Dementia, 

who presented to the ER with complaints of frequent falls (at least 3) for the 

last 3-4 weeks. He had a recent history of right rib fractures because of a 

fall. He notes that he does not have any LOC or symptoms with the falls. 

Patient denied chest pain or head trauma.





Patient was seen and examined at the bedside. Denies any problems. 





Objective:


Vitals (See below)


General: Lying in bed, no acute distress, comfortable, AAOx3


HEENT: NC, AT


CVS: RRR, +S1S2


Lungs: Fair air entry b/l, -e/r/r


Abdomen: Soft, ND, NT, +BSx4


Extremities: +PPx4, - edema, - calf tenderness





Assessment and plan:


1. Frequent falls - possibly 2/2 arachnoid cyst with compression of adjacent 

parietal lobe, possibly 2/2 dehydration 


- Presented with at least 3 falls over 1 month duration; found to be 

hypotensive in ER


- Physical reveals no focal weakness


- CT reveals arachnoid cyst; MRI 2/15 revealed 4.4*5.3*4.1 cm arachnoid cyst in 

posterior right parietal lobe


- Cisternogram: reveals intraparenchymal cyst in right hemisphere does not 

communicate with CSF 


- Neurosurgery (Dr. Patel) following; planning for intervention - risks and 

benefits explained by Dr. Patel 


- Surgery delayed for platelet function abnormality - will be kept NPO post 

midnight





2. Dementia - possibly Alzheimer's


- Evaluated by neurology


- Neurology (Dr. Warren) following - evaluating for reversible causes of dementia





3. Vitamin D deficiency 


- c/w Vitamin D supplementation 





4. s/p Acute kidney injury - likely 2/2 pre-renal etiology


- Cr has trended back to normal


- s/p IV fluid hydration 





5. s/p Hypotension - possibly 2/2 dehydration


- s/p IV fluid hydration 





6. DM2


- A1c of 6.3


- c/w ISS





7. DLP


- c/w simvastatin 





8. HTN


- BP controlled


- c/w Lisinopril to 40 mg PO at this time; with holding parameters





9. Dementia


- c/w memantine 


 


10. Subclinical hyperthyroidism 





11. DVT prophylaxis


- c/w SCDs





Disposition: 


- Surgery with Dr. Patel planned for tomorrow





VS,Fishbone, I+O


VS, Fishbone, I+O


 Laboratory Tests


2/28/17 06:26








Calcium Level 8.4 L, Red Blood Count 4.46, Mean Corpuscular Volume 87.1, Mean 

Corpuscular Hemoglobin 29.1, Mean Corpuscular Hemoglobin Concent 33.4, Red Cell 

Distribution Width 13.4








 Vital Signs








  Date Time  Temp Pulse Resp B/P Pulse Ox O2 Delivery O2 Flow Rate FiO2


 


2/28/17 06:00 96.4 56 16 150/75 98 Room Air  














 I&O- Last 24 Hours up to 6 AM 


 


 2/28/17





 06:00


 


Intake Total 520 ml


 


Output Total 1475 ml


 


Balance -955 ml














HYACINTH CULVER MD Feb 28, 2017 13:07

## 2017-03-01 VITALS — DIASTOLIC BLOOD PRESSURE: 73 MMHG | SYSTOLIC BLOOD PRESSURE: 126 MMHG

## 2017-03-01 VITALS — DIASTOLIC BLOOD PRESSURE: 63 MMHG | SYSTOLIC BLOOD PRESSURE: 122 MMHG

## 2017-03-01 VITALS — DIASTOLIC BLOOD PRESSURE: 56 MMHG | SYSTOLIC BLOOD PRESSURE: 109 MMHG

## 2017-03-01 LAB
ANION GAP SERPL CALC-SCNC: 8 MEQ/L (ref 8–16)
BUN SERPL-MCNC: 23 MG/DL (ref 7–18)
CALCIUM SERPL-MCNC: 8.5 MG/DL (ref 8.8–10.2)
CHLORIDE SERPL-SCNC: 105 MEQ/L (ref 98–107)
CO2 SERPL-SCNC: 28 MEQ/L (ref 21–32)
CREAT SERPL-MCNC: 0.75 MG/DL (ref 0.7–1.3)
ERYTHROCYTE [DISTWIDTH] IN BLOOD BY AUTOMATED COUNT: 13.3 % (ref 11.5–14.5)
GFR SERPL CREATININE-BSD FRML MDRD: > 60 ML/MIN/{1.73_M2} (ref 42–?)
GLUCOSE SERPL-MCNC: 99 MG/DL (ref 83–110)
MCH RBC QN AUTO: 28.4 PG (ref 27–33)
MCHC RBC AUTO-ENTMCNC: 33.1 G/DL (ref 32–36.5)
MCV RBC AUTO: 86 FL (ref 80–96)
PLATELET # BLD AUTO: 209 K/MM3 (ref 150–450)
POTASSIUM SERPL-SCNC: 4.6 MEQ/L (ref 3.5–5.1)
SODIUM SERPL-SCNC: 141 MEQ/L (ref 136–145)
WBC # BLD AUTO: 5.7 K/MM3 (ref 4–10)

## 2017-03-01 RX ADMIN — Medication SCH UNITS: at 10:18

## 2017-03-01 RX ADMIN — DOCUSATE SODIUM SCH MG: 100 CAPSULE, LIQUID FILLED ORAL at 10:18

## 2017-03-01 RX ADMIN — MEMANTINE SCH MG: 5 TABLET ORAL at 22:12

## 2017-03-01 RX ADMIN — DOCUSATE SODIUM SCH MG: 100 CAPSULE, LIQUID FILLED ORAL at 22:12

## 2017-03-01 RX ADMIN — SIMVASTATIN SCH MG: 20 TABLET, FILM COATED ORAL at 22:12

## 2017-03-01 NOTE — IPN
DATE:  03/01/2017

 

Patient is sitting up comfortably in his chair. He is alert and oriented times

three. He is able to remember driving directions to [facility] in [place] and to

[place]. He is accompanied by his daughter, Rina. Patient is also able to name

each of his six children in birthing order. Patient does not have any new

symptoms or findings today.

 

Labs are noted.

 

Discussed his upcoming surgery for tomorrow. All of his questions were answered

to his satisfaction. Will be checking platelet function in the morning prior to

surgery. Patient does not have any further questions. Dr. aPtel was present for

this round.

## 2017-03-01 NOTE — IPNPDOC
Text Note


Date of Service


The patient was seen on 3/1/17.





NOTE


Subjective:


Patient is a 76 year old  male with a PMHx of HTN, DLP, DM2, Dementia, 

who presented to the ER with complaints of frequent falls (at least 3) for the 

last 3-4 weeks. He had a recent history of right rib fractures because of a 

fall. He notes that he does not have any LOC or symptoms with the falls. 

Patient denied chest pain or head trauma.





Patient was seen and examined at the bedside. Again denies any new issues. Is a 

little concerned that there is a delay.





Objective:


Vitals (See below)


General: Lying in bed, no acute distress, comfortable, AAOx3


HEENT: NC, AT


CVS: RRR, +S1S2


Lungs: Fair air entry b/l, -e/r/r


Abdomen: Soft, ND, NT, +BSx4


Extremities: +PPx4, - edema, - calf tenderness





Assessment and plan:


1. Frequent falls - possibly 2/2 arachnoid cyst with compression of adjacent 

parietal lobe, possibly 2/2 dehydration 


- Presented with at least 3 falls over 1 month duration; found to be 

hypotensive in ER


- Physical reveals no focal weakness


- CT reveals arachnoid cyst; MRI 2/15 revealed 4.4*5.3*4.1 cm arachnoid cyst in 

posterior right parietal lobe


- Cisternogram: reveals intraparenchymal cyst in right hemisphere does not 

communicate with CSF 


- Neurosurgery (Dr. Patel) following; planning for intervention - risks and 

benefits explained by Dr. Patel 


- Surgery delayed for platelet function abnormality


- Plan for surgery on 3/2 with Dr. Patel





2. Dementia - possibly Alzheimer's


- Evaluated by neurology


- Neurology (Dr. Warren) following - evaluating for reversible causes of dementia





3. Vitamin D deficiency 


- c/w Vitamin D supplementation 





4. s/p Acute kidney injury - likely 2/2 pre-renal etiology


- Cr has trended back to normal


- s/p IV fluid hydration 





5. s/p Hypotension - possibly 2/2 dehydration


- s/p IV fluid hydration 





6. DM2


- A1c of 6.3


- c/w ISS





7. DLP


- c/w simvastatin 





8. HTN


- BP controlled


- c/w Lisinopril to 40 mg PO at this time; with holding parameters





9. Dementia


- c/w memantine 


 


10. Subclinical hyperthyroidism 





11. DVT prophylaxis


- c/w SCDs





Disposition: 


- Surgery with Dr. Patel - awaiting





VS,Amandeep, I+O


VS, Amandeep, I+O


 Laboratory Tests


3/1/17 06:14








Calcium Level 8.5 L, Red Blood Count 4.54, Mean Corpuscular Volume 86.0, Mean 

Corpuscular Hemoglobin 28.4, Mean Corpuscular Hemoglobin Concent 33.1, Red Cell 

Distribution Width 13.3








 Vital Signs








  Date Time  Temp Pulse Resp B/P Pulse Ox O2 Delivery O2 Flow Rate FiO2


 


3/1/17 06:43 98.1 64 18 126/73 97 Room Air  














 I&O- Last 24 Hours up to 6 AM 


 


 3/1/17





 06:00


 


Intake Total 1580 ml


 


Output Total 2750 ml


 


Balance -1170 ml














HYACINTH CULVER MD Mar 1, 2017 12:57

## 2017-03-02 VITALS — DIASTOLIC BLOOD PRESSURE: 71 MMHG | SYSTOLIC BLOOD PRESSURE: 154 MMHG

## 2017-03-02 VITALS — SYSTOLIC BLOOD PRESSURE: 137 MMHG | DIASTOLIC BLOOD PRESSURE: 62 MMHG

## 2017-03-02 VITALS — DIASTOLIC BLOOD PRESSURE: 55 MMHG | SYSTOLIC BLOOD PRESSURE: 99 MMHG

## 2017-03-02 VITALS — SYSTOLIC BLOOD PRESSURE: 109 MMHG | DIASTOLIC BLOOD PRESSURE: 43 MMHG

## 2017-03-02 VITALS — DIASTOLIC BLOOD PRESSURE: 55 MMHG | SYSTOLIC BLOOD PRESSURE: 130 MMHG

## 2017-03-02 VITALS — SYSTOLIC BLOOD PRESSURE: 121 MMHG | DIASTOLIC BLOOD PRESSURE: 53 MMHG

## 2017-03-02 VITALS — SYSTOLIC BLOOD PRESSURE: 114 MMHG | DIASTOLIC BLOOD PRESSURE: 58 MMHG

## 2017-03-02 VITALS — SYSTOLIC BLOOD PRESSURE: 127 MMHG | DIASTOLIC BLOOD PRESSURE: 55 MMHG

## 2017-03-02 VITALS — SYSTOLIC BLOOD PRESSURE: 98 MMHG | DIASTOLIC BLOOD PRESSURE: 48 MMHG

## 2017-03-02 VITALS — DIASTOLIC BLOOD PRESSURE: 58 MMHG | SYSTOLIC BLOOD PRESSURE: 104 MMHG

## 2017-03-02 VITALS — DIASTOLIC BLOOD PRESSURE: 57 MMHG | SYSTOLIC BLOOD PRESSURE: 133 MMHG

## 2017-03-02 VITALS — SYSTOLIC BLOOD PRESSURE: 130 MMHG | DIASTOLIC BLOOD PRESSURE: 49 MMHG

## 2017-03-02 LAB
ANION GAP SERPL CALC-SCNC: 9 MEQ/L (ref 8–16)
BUN SERPL-MCNC: 22 MG/DL (ref 7–18)
BUN SERPL-MCNC: 29 MG/DL (ref 7–18)
CALCIUM SERPL-MCNC: 7.9 MG/DL (ref 8.8–10.2)
CALCIUM SERPL-MCNC: 8 MG/DL (ref 8.8–10.2)
CHLORIDE SERPL-SCNC: 105 MEQ/L (ref 98–107)
CHLORIDE SERPL-SCNC: 105 MEQ/L (ref 98–107)
CHLORIDE SERPL-SCNC: 107 MEQ/L (ref 98–107)
CO2 SERPL-SCNC: 26 MEQ/L (ref 21–32)
CO2 SERPL-SCNC: 27 MEQ/L (ref 21–32)
CO2 SERPL-SCNC: 27 MEQ/L (ref 21–32)
CREAT SERPL-MCNC: 0.71 MG/DL (ref 0.7–1.3)
CREAT SERPL-MCNC: 0.71 MG/DL (ref 0.7–1.3)
ERYTHROCYTE [DISTWIDTH] IN BLOOD BY AUTOMATED COUNT: 13.3 % (ref 11.5–14.5)
GFR SERPL CREATININE-BSD FRML MDRD: > 60 ML/MIN/{1.73_M2} (ref 42–?)
GFR SERPL CREATININE-BSD FRML MDRD: > 60 ML/MIN/{1.73_M2} (ref 42–?)
GLUCOSE SERPL-MCNC: 102 MG/DL (ref 83–110)
GLUCOSE SERPL-MCNC: 170 MG/DL (ref 83–110)
MCH RBC QN AUTO: 28.3 PG (ref 27–33)
MCHC RBC AUTO-ENTMCNC: 32.9 G/DL (ref 32–36.5)
MCV RBC AUTO: 86.1 FL (ref 80–96)
OSMOLALITY SERPL: 297 MOSM/KG (ref 280–301)
OSMOLALITY SERPL: 300 MOSM/KG (ref 280–301)
OSMOLALITY UR: 281 MOSM/KG (ref 500–800)
OSMOLALITY UR: 596 MOSM/KG (ref 500–800)
PLATELET # BLD AUTO: 206 K/MM3 (ref 150–450)
POTASSIUM SERPL-SCNC: 3.7 MEQ/L (ref 3.5–5.1)
POTASSIUM SERPL-SCNC: 3.8 MEQ/L (ref 3.5–5.1)
POTASSIUM SERPL-SCNC: 4.2 MEQ/L (ref 3.5–5.1)
SODIUM SERPL-SCNC: 141 MEQ/L (ref 136–145)
SODIUM SERPL-SCNC: 141 MEQ/L (ref 136–145)
SODIUM SERPL-SCNC: 142 MEQ/L (ref 136–145)
WBC # BLD AUTO: 6.6 K/MM3 (ref 4–10)

## 2017-03-02 PROCEDURE — 00B00ZX EXCISION OF BRAIN, OPEN APPROACH, DIAGNOSTIC: ICD-10-PCS | Performed by: NEUROLOGICAL SURGERY

## 2017-03-02 RX ADMIN — MEMANTINE SCH MG: 5 TABLET ORAL at 20:12

## 2017-03-02 RX ADMIN — POTASSIUM CHLORIDE, DEXTROSE MONOHYDRATE AND SODIUM CHLORIDE SCH MLS/HR: 150; 5; 450 INJECTION, SOLUTION INTRAVENOUS at 14:59

## 2017-03-02 RX ADMIN — NAFCILLIN SODIUM SCH MLS/HR: 1 INJECTION, POWDER, FOR SOLUTION INTRAMUSCULAR; INTRAVENOUS at 21:05

## 2017-03-02 RX ADMIN — NAFCILLIN SODIUM SCH MLS/HR: 1 INJECTION, POWDER, FOR SOLUTION INTRAMUSCULAR; INTRAVENOUS at 16:00

## 2017-03-02 RX ADMIN — DOCUSATE SODIUM SCH MG: 100 CAPSULE, LIQUID FILLED ORAL at 20:12

## 2017-03-02 RX ADMIN — LISINOPRIL SCH MG: 40 TABLET ORAL at 09:00

## 2017-03-02 RX ADMIN — SIMVASTATIN SCH MG: 20 TABLET, FILM COATED ORAL at 20:12

## 2017-03-02 RX ADMIN — INSULIN LISPRO SCH UNITS: 100 INJECTION, SOLUTION INTRAVENOUS; SUBCUTANEOUS at 20:42

## 2017-03-02 RX ADMIN — DOCUSATE SODIUM SCH MG: 100 CAPSULE, LIQUID FILLED ORAL at 09:00

## 2017-03-02 RX ADMIN — Medication SCH UNITS: at 09:00

## 2017-03-02 NOTE — IPNPDOC
Text Note


Date of Service


The patient was seen on 3/2/17.





NOTE


Subjective:


Patient is a 76 year old  male with a PMHx of HTN, DLP, DM2, Dementia, 

who presented to the ER with complaints of frequent falls (at least 3) for the 

last 3-4 weeks. He had a recent history of right rib fractures because of a 

fall. He notes that he does not have any LOC or symptoms with the falls. 

Patient denied chest pain or head trauma.





Patient was seen and examined at the bedside. No issues





Objective:


Vitals (See below)


General: Lying in bed, no acute distress, comfortable, AAOx3


HEENT: NC, AT


CVS: RRR, +S1S2


Lungs: Fair air entry b/l, -e/r/r


Abdomen: Soft, ND, NT, +BSx4


Extremities: +PPx4, - edema, - calf tenderness





Assessment and plan:


1. Frequent falls - possibly 2/2 arachnoid cyst with compression of adjacent 

parietal lobe, possibly 2/2 dehydration 


- Presented with at least 3 falls over 1 month duration; found to be 

hypotensive in ER


- Physical reveals no focal weakness


- CT reveals arachnoid cyst; MRI 2/15 revealed 4.4*5.3*4.1 cm arachnoid cyst in 

posterior right parietal lobe


- Cisternogram: reveals intraparenchymal cyst in right hemisphere does not 

communicate with CSF 


- Platelet function adequate for surgery today; Plan for surgery today with Dr. Patel - risks and benefits explained by Dr. Patel 





2. Dementia - possibly Alzheimer's


- Evaluated by neurology


- Neurology (Dr. Warren) following - evaluating for reversible causes of dementia





3. Vitamin D deficiency 


- c/w Vitamin D supplementation 





4. s/p Acute kidney injury - likely 2/2 pre-renal etiology


- Cr has trended back to normal


- s/p IV fluid hydration 





5. s/p Hypotension - possibly 2/2 dehydration


- s/p IV fluid hydration 





6. DM2


- A1c of 6.3


- c/w ISS





7. DLP


- c/w simvastatin 





8. HTN


- BP controlled


- c/w Lisinopril to 40 mg PO at this time; with holding parameters





9. Dementia


- c/w memantine 


 


10. Subclinical hyperthyroidism 





11. DVT prophylaxis


- c/w SCDs





Disposition: 


- Surgery with Dr. Patel today





VS,Amandeep, I+O


VS, Amandeep, I+O


 Laboratory Tests


3/2/17 06:05








Calcium Level 8.0 L, Red Blood Count 4.52, Mean Corpuscular Volume 86.1, Mean 

Corpuscular Hemoglobin 28.3, Mean Corpuscular Hemoglobin Concent 32.9, Red Cell 

Distribution Width 13.3








 Vital Signs








  Date Time  Temp Pulse Resp B/P Pulse Ox O2 Delivery O2 Flow Rate FiO2


 


3/2/17 06:00 97.5 71 20 154/71 96   


 


3/1/17 14:00      Room Air  














 I&O- Last 24 Hours up to 6 AM 


 


 3/2/17





 06:00


 


Intake Total 1560 ml


 


Output Total 1250 ml


 


Balance 310 ml














HYACINTH CULVER MD Mar 2, 2017 11:53

## 2017-03-03 VITALS — SYSTOLIC BLOOD PRESSURE: 108 MMHG | DIASTOLIC BLOOD PRESSURE: 49 MMHG

## 2017-03-03 VITALS — DIASTOLIC BLOOD PRESSURE: 58 MMHG | SYSTOLIC BLOOD PRESSURE: 126 MMHG

## 2017-03-03 VITALS — SYSTOLIC BLOOD PRESSURE: 121 MMHG | DIASTOLIC BLOOD PRESSURE: 37 MMHG

## 2017-03-03 VITALS — SYSTOLIC BLOOD PRESSURE: 126 MMHG | DIASTOLIC BLOOD PRESSURE: 58 MMHG

## 2017-03-03 VITALS — DIASTOLIC BLOOD PRESSURE: 53 MMHG | SYSTOLIC BLOOD PRESSURE: 103 MMHG

## 2017-03-03 VITALS — DIASTOLIC BLOOD PRESSURE: 63 MMHG | SYSTOLIC BLOOD PRESSURE: 132 MMHG

## 2017-03-03 VITALS — SYSTOLIC BLOOD PRESSURE: 134 MMHG | DIASTOLIC BLOOD PRESSURE: 69 MMHG

## 2017-03-03 VITALS — SYSTOLIC BLOOD PRESSURE: 153 MMHG | DIASTOLIC BLOOD PRESSURE: 72 MMHG

## 2017-03-03 VITALS — DIASTOLIC BLOOD PRESSURE: 72 MMHG | SYSTOLIC BLOOD PRESSURE: 114 MMHG

## 2017-03-03 VITALS — DIASTOLIC BLOOD PRESSURE: 60 MMHG | SYSTOLIC BLOOD PRESSURE: 129 MMHG

## 2017-03-03 VITALS — SYSTOLIC BLOOD PRESSURE: 125 MMHG | DIASTOLIC BLOOD PRESSURE: 60 MMHG

## 2017-03-03 VITALS — DIASTOLIC BLOOD PRESSURE: 66 MMHG | SYSTOLIC BLOOD PRESSURE: 110 MMHG

## 2017-03-03 VITALS — DIASTOLIC BLOOD PRESSURE: 40 MMHG | SYSTOLIC BLOOD PRESSURE: 129 MMHG

## 2017-03-03 VITALS — SYSTOLIC BLOOD PRESSURE: 100 MMHG | DIASTOLIC BLOOD PRESSURE: 52 MMHG

## 2017-03-03 VITALS — SYSTOLIC BLOOD PRESSURE: 132 MMHG | DIASTOLIC BLOOD PRESSURE: 61 MMHG

## 2017-03-03 LAB
ALBUMIN SERPL BCG-MCNC: 3.1 GM/DL (ref 3.2–5.2)
ALBUMIN/GLOB SERPL: 1.07 {RATIO} (ref 1–1.93)
ALP SERPL-CCNC: 89 U/L (ref 45–117)
ALT SERPL W P-5'-P-CCNC: 19 U/L (ref 12–78)
ANION GAP SERPL CALC-SCNC: 6 MEQ/L (ref 8–16)
AST SERPL-CCNC: 10 U/L (ref 15–37)
BASOPHILS # BLD AUTO: 0 K/MM3 (ref 0–0.2)
BASOPHILS NFR BLD AUTO: 0.1 % (ref 0–1)
BILIRUB SERPL-MCNC: 0.5 MG/DL (ref 0.2–1)
BUN SERPL-MCNC: 14 MG/DL (ref 7–18)
CALCIUM SERPL-MCNC: 8.3 MG/DL (ref 8.8–10.2)
CHLORIDE SERPL-SCNC: 108 MEQ/L (ref 98–107)
CO2 SERPL-SCNC: 28 MEQ/L (ref 21–32)
CREAT SERPL-MCNC: 0.69 MG/DL (ref 0.7–1.3)
EOSINOPHIL # BLD AUTO: 0 K/MM3 (ref 0–0.5)
EOSINOPHIL NFR BLD AUTO: 0.5 % (ref 0–3)
ERYTHROCYTE [DISTWIDTH] IN BLOOD BY AUTOMATED COUNT: 13.5 % (ref 11.5–14.5)
GFR SERPL CREATININE-BSD FRML MDRD: > 60 ML/MIN/{1.73_M2} (ref 42–?)
GLUCOSE SERPL-MCNC: 89 MG/DL (ref 83–110)
LARGE UNSTAINED CELL #: 0.2 K/MM3 (ref 0–0.4)
LARGE UNSTAINED CELL %: 1.9 % (ref 0–4)
LYMPHOCYTES # BLD AUTO: 1.3 K/MM3 (ref 1.5–4.5)
LYMPHOCYTES NFR BLD AUTO: 14.2 % (ref 24–44)
MCH RBC QN AUTO: 28.9 PG (ref 27–33)
MCHC RBC AUTO-ENTMCNC: 33.3 G/DL (ref 32–36.5)
MCV RBC AUTO: 86.7 FL (ref 80–96)
MONOCYTES # BLD AUTO: 0.6 K/MM3 (ref 0–0.8)
MONOCYTES NFR BLD AUTO: 6.3 % (ref 0–5)
NEUTROPHILS # BLD AUTO: 7 K/MM3 (ref 1.8–7.7)
NEUTROPHILS NFR BLD AUTO: 77 % (ref 36–66)
OSMOLALITY UR: 296 MOSM/KG (ref 500–800)
PLATELET # BLD AUTO: 179 K/MM3 (ref 150–450)
POTASSIUM SERPL-SCNC: 3.7 MEQ/L (ref 3.5–5.1)
PROT SERPL-MCNC: 6 GM/DL (ref 6.4–8.2)
SODIUM SERPL-SCNC: 142 MEQ/L (ref 136–145)
WBC # BLD AUTO: 9.1 K/MM3 (ref 4–10)

## 2017-03-03 RX ADMIN — Medication SCH UNITS: at 08:32

## 2017-03-03 RX ADMIN — MEMANTINE SCH MG: 5 TABLET ORAL at 20:28

## 2017-03-03 RX ADMIN — INSULIN LISPRO SCH UNITS: 100 INJECTION, SOLUTION INTRAVENOUS; SUBCUTANEOUS at 11:37

## 2017-03-03 RX ADMIN — NAFCILLIN SODIUM SCH MLS/HR: 1 INJECTION, POWDER, FOR SOLUTION INTRAMUSCULAR; INTRAVENOUS at 04:58

## 2017-03-03 RX ADMIN — DOCUSATE SODIUM SCH MG: 100 CAPSULE, LIQUID FILLED ORAL at 20:28

## 2017-03-03 RX ADMIN — INSULIN LISPRO SCH UNITS: 100 INJECTION, SOLUTION INTRAVENOUS; SUBCUTANEOUS at 06:55

## 2017-03-03 RX ADMIN — SIMVASTATIN SCH MG: 20 TABLET, FILM COATED ORAL at 20:28

## 2017-03-03 RX ADMIN — INSULIN LISPRO SCH UNITS: 100 INJECTION, SOLUTION INTRAVENOUS; SUBCUTANEOUS at 20:29

## 2017-03-03 RX ADMIN — POTASSIUM CHLORIDE, DEXTROSE MONOHYDRATE AND SODIUM CHLORIDE SCH MLS/HR: 150; 5; 450 INJECTION, SOLUTION INTRAVENOUS at 00:21

## 2017-03-03 RX ADMIN — DOCUSATE SODIUM SCH MG: 100 CAPSULE, LIQUID FILLED ORAL at 08:32

## 2017-03-03 RX ADMIN — LISINOPRIL SCH MG: 40 TABLET ORAL at 08:32

## 2017-03-03 RX ADMIN — ACETAMINOPHEN PRN MG: 325 TABLET ORAL at 20:29

## 2017-03-03 RX ADMIN — INSULIN LISPRO SCH UNITS: 100 INJECTION, SOLUTION INTRAVENOUS; SUBCUTANEOUS at 17:14

## 2017-03-04 VITALS — SYSTOLIC BLOOD PRESSURE: 138 MMHG | DIASTOLIC BLOOD PRESSURE: 71 MMHG

## 2017-03-04 VITALS — SYSTOLIC BLOOD PRESSURE: 131 MMHG | DIASTOLIC BLOOD PRESSURE: 75 MMHG

## 2017-03-04 VITALS — DIASTOLIC BLOOD PRESSURE: 77 MMHG | SYSTOLIC BLOOD PRESSURE: 155 MMHG

## 2017-03-04 VITALS — SYSTOLIC BLOOD PRESSURE: 136 MMHG | DIASTOLIC BLOOD PRESSURE: 72 MMHG

## 2017-03-04 VITALS — DIASTOLIC BLOOD PRESSURE: 62 MMHG | SYSTOLIC BLOOD PRESSURE: 127 MMHG

## 2017-03-04 VITALS — DIASTOLIC BLOOD PRESSURE: 60 MMHG | SYSTOLIC BLOOD PRESSURE: 127 MMHG

## 2017-03-04 VITALS — DIASTOLIC BLOOD PRESSURE: 74 MMHG | SYSTOLIC BLOOD PRESSURE: 147 MMHG

## 2017-03-04 VITALS — SYSTOLIC BLOOD PRESSURE: 139 MMHG | DIASTOLIC BLOOD PRESSURE: 80 MMHG

## 2017-03-04 LAB
ALBUMIN SERPL BCG-MCNC: 3.1 GM/DL (ref 3.2–5.2)
ALBUMIN/GLOB SERPL: 0.97 {RATIO} (ref 1–1.93)
ALP SERPL-CCNC: 102 U/L (ref 45–117)
ALT SERPL W P-5'-P-CCNC: 23 U/L (ref 12–78)
ANION GAP SERPL CALC-SCNC: 7 MEQ/L (ref 8–16)
ANION GAP SERPL CALC-SCNC: 7 MEQ/L (ref 8–16)
AST SERPL-CCNC: 13 U/L (ref 15–37)
BASOPHILS # BLD AUTO: 0 K/MM3 (ref 0–0.2)
BASOPHILS NFR BLD AUTO: 0.4 % (ref 0–1)
BILIRUB SERPL-MCNC: 0.7 MG/DL (ref 0.2–1)
BUN SERPL-MCNC: 12 MG/DL (ref 7–18)
CALCIUM SERPL-MCNC: 8.5 MG/DL (ref 8.8–10.2)
CHLORIDE SERPL-SCNC: 105 MEQ/L (ref 98–107)
CHLORIDE SERPL-SCNC: 106 MEQ/L (ref 98–107)
CO2 SERPL-SCNC: 28 MEQ/L (ref 21–32)
CO2 SERPL-SCNC: 30 MEQ/L (ref 21–32)
CREAT SERPL-MCNC: 0.68 MG/DL (ref 0.7–1.3)
EOSINOPHIL # BLD AUTO: 0.1 K/MM3 (ref 0–0.5)
EOSINOPHIL NFR BLD AUTO: 1.3 % (ref 0–3)
ERYTHROCYTE [DISTWIDTH] IN BLOOD BY AUTOMATED COUNT: 13.3 % (ref 11.5–14.5)
GFR SERPL CREATININE-BSD FRML MDRD: > 60 ML/MIN/{1.73_M2} (ref 42–?)
GLUCOSE SERPL-MCNC: 107 MG/DL (ref 83–110)
LARGE UNSTAINED CELL #: 0.2 K/MM3 (ref 0–0.4)
LARGE UNSTAINED CELL %: 2 % (ref 0–4)
LYMPHOCYTES # BLD AUTO: 1.3 K/MM3 (ref 1.5–4.5)
LYMPHOCYTES NFR BLD AUTO: 13.6 % (ref 24–44)
MCH RBC QN AUTO: 28.5 PG (ref 27–33)
MCHC RBC AUTO-ENTMCNC: 32.7 G/DL (ref 32–36.5)
MCV RBC AUTO: 87.2 FL (ref 80–96)
MONOCYTES # BLD AUTO: 0.8 K/MM3 (ref 0–0.8)
MONOCYTES NFR BLD AUTO: 7.8 % (ref 0–5)
NEUTROPHILS # BLD AUTO: 7.2 K/MM3 (ref 1.8–7.7)
NEUTROPHILS NFR BLD AUTO: 75 % (ref 36–66)
OSMOLALITY SERPL: 291 MOSM/KG (ref 280–301)
OSMOLALITY UR: 398 MOSM/KG (ref 500–800)
PLATELET # BLD AUTO: 166 K/MM3 (ref 150–450)
POTASSIUM SERPL-SCNC: 3.8 MEQ/L (ref 3.5–5.1)
POTASSIUM SERPL-SCNC: 4 MEQ/L (ref 3.5–5.1)
PROT SERPL-MCNC: 6.3 GM/DL (ref 6.4–8.2)
SODIUM SERPL-SCNC: 141 MEQ/L (ref 136–145)
SODIUM SERPL-SCNC: 142 MEQ/L (ref 136–145)
WBC # BLD AUTO: 9.7 K/MM3 (ref 4–10)

## 2017-03-04 RX ADMIN — ACETAMINOPHEN PRN MG: 325 TABLET ORAL at 20:12

## 2017-03-04 RX ADMIN — INSULIN LISPRO SCH UNITS: 100 INJECTION, SOLUTION INTRAVENOUS; SUBCUTANEOUS at 17:50

## 2017-03-04 RX ADMIN — DOCUSATE SODIUM SCH MG: 100 CAPSULE, LIQUID FILLED ORAL at 20:11

## 2017-03-04 RX ADMIN — INSULIN LISPRO SCH UNITS: 100 INJECTION, SOLUTION INTRAVENOUS; SUBCUTANEOUS at 12:10

## 2017-03-04 RX ADMIN — LISINOPRIL SCH MG: 40 TABLET ORAL at 07:51

## 2017-03-04 RX ADMIN — MEMANTINE SCH MG: 5 TABLET ORAL at 20:11

## 2017-03-04 RX ADMIN — INSULIN LISPRO SCH UNITS: 100 INJECTION, SOLUTION INTRAVENOUS; SUBCUTANEOUS at 20:10

## 2017-03-04 RX ADMIN — INSULIN LISPRO SCH UNITS: 100 INJECTION, SOLUTION INTRAVENOUS; SUBCUTANEOUS at 07:53

## 2017-03-04 RX ADMIN — ACETAMINOPHEN PRN MG: 325 TABLET ORAL at 00:17

## 2017-03-04 RX ADMIN — DOCUSATE SODIUM SCH MG: 100 CAPSULE, LIQUID FILLED ORAL at 07:50

## 2017-03-04 RX ADMIN — SIMVASTATIN SCH MG: 20 TABLET, FILM COATED ORAL at 20:11

## 2017-03-04 RX ADMIN — Medication SCH UNITS: at 07:50

## 2017-03-04 NOTE — IPNPDOC
Text Note


Date of Service


The patient was seen on 3/4/17.





NOTE


Subjective:


Patient is a 76 year old  male with a PMHx of HTN, DLP, DM2, Dementia, 

who presented to the ER with complaints of frequent falls (at least 3) for the 

last 3-4 weeks. He had a recent history of right rib fractures because of a 

fall. He notes that he does not have any LOC or symptoms with the falls. 

Patient denied chest pain or head trauma.





Patient was seen and examined at the bedside. He has received surgery. Again he 

denies any problems, and is eager to start rehab. 





Objective:


Vitals (See below)


General: Lying in bed, no acute distress, comfortable, AAOx3


HEENT: Incision to scalp - in dressing 


CVS: RRR, +S1S2


Lungs: Fair air entry b/l, -e/r/r


Abdomen: Soft, ND, NT, +BSx4


Extremities: +PPx4, - edema, - calf tenderness





Assessment and plan:


1. Frequent falls - possibly 2/2 arachnoid cyst with compression of adjacent 

parietal lobe, less likely 2/2 dehydration 


- Presented with at least 3 falls over 1 month duration; found to be 

hypotensive in ER


- Physical reveals no focal weakness


- CT reveals arachnoid cyst; MRI 2/15 revealed 4.4*5.3*4.1 cm arachnoid cyst in 

posterior right parietal lobe


- Cisternogram: reveals intraparenchymal cyst in right hemisphere does not 

communicate with CSF 


- s/p Frameless stereotactic right parietal craniotomy with marsupialization of 

subarachnoid cyst with Dr. Patel


- Dr. Patel on consult - appreciate their input


- Will start physical therapy 





2. Dementia - possibly Alzheimer's


- Evaluated by neurology


- Neurology (Dr. Warren) following - evaluating for reversible causes of dementia





3. Vitamin D deficiency 


- c/w Vitamin D supplementation 





4. s/p Acute kidney injury - likely 2/2 pre-renal etiology


- Cr has trended back to normal


- s/p IV fluid hydration 





5. s/p Hypotension - possibly 2/2 dehydration


- s/p IV fluid hydration 





6. DM2


- A1c of 6.3


- c/w ISS





7. DLP


- c/w simvastatin 





8. HTN


- BP controlled


- c/w Lisinopril 40 mg PO with holding parameters





9. Dementia


- c/w memantine 


 


10. Subclinical hyperthyroidism 





11. DVT prophylaxis


- c/w SCDs





Disposition: 


- Will require physical therapy today and determine disposition based on their 

recommendations





VSAmandeep, I+O


VS, Amandeep, I+O


 Laboratory Tests


3/4/17 01:41








Anion Gap 7 L





3/4/17 05:57








Calcium Level 8.5 L, Aspartate Amino Transf (AST/SGOT) 13 L, Alanine 

Aminotransferase (ALT/SGPT) 23, Alkaline Phosphatase 102, Total Bilirubin 0.7, 

Total Protein 6.3 L, Albumin 3.1 L, Red Blood Count 4.18 L, Mean Corpuscular 

Volume 87.2, Mean Corpuscular Hemoglobin 28.5, Mean Corpuscular Hemoglobin 

Concent 32.7, Red Cell Distribution Width 13.3, Neutrophils (%) (Auto) 75.0 H, 

Lymphocytes (%) (Auto) 13.6 L, Monocytes (%) (Auto) 7.8 H, Eosinophils (%) (Auto

) 1.3, Basophils (%) (Auto) 0.4, Neutrophils # (Auto) 7.2, Lymphocytes # (Auto) 

1.3 L, Monocytes # (Auto) 0.8, Eosinophils # (Auto) 0.1, Basophils # (Auto) 0.0








 Vital Signs








  Date Time  Temp Pulse Resp B/P Pulse Ox O2 Delivery O2 Flow Rate FiO2


 


3/4/17 08:00 98.0 72 18 131/75 100 Room Air  


 


3/3/17 02:00       1.0 














 I&O- Last 24 Hours up to 6 AM 


 


 3/4/17





 06:00


 


Intake Total 2280 ml


 


Output Total 2895 ml


 


Balance -615 ml














HYACINTH CULVER MD Mar 4, 2017 10:00

## 2017-03-05 VITALS — SYSTOLIC BLOOD PRESSURE: 130 MMHG | DIASTOLIC BLOOD PRESSURE: 68 MMHG

## 2017-03-05 VITALS — SYSTOLIC BLOOD PRESSURE: 121 MMHG | DIASTOLIC BLOOD PRESSURE: 67 MMHG

## 2017-03-05 VITALS — SYSTOLIC BLOOD PRESSURE: 168 MMHG | DIASTOLIC BLOOD PRESSURE: 79 MMHG

## 2017-03-05 VITALS — SYSTOLIC BLOOD PRESSURE: 129 MMHG | DIASTOLIC BLOOD PRESSURE: 66 MMHG

## 2017-03-05 LAB
ALBUMIN SERPL BCG-MCNC: 2.8 GM/DL (ref 3.2–5.2)
ALBUMIN/GLOB SERPL: 0.74 {RATIO} (ref 1–1.93)
ALP SERPL-CCNC: 99 U/L (ref 45–117)
ALT SERPL W P-5'-P-CCNC: 26 U/L (ref 12–78)
ANION GAP SERPL CALC-SCNC: 9 MEQ/L (ref 8–16)
AST SERPL-CCNC: 15 U/L (ref 15–37)
BASOPHILS # BLD AUTO: 0 K/MM3 (ref 0–0.2)
BASOPHILS NFR BLD AUTO: 0.3 % (ref 0–1)
BILIRUB SERPL-MCNC: 0.7 MG/DL (ref 0.2–1)
BUN SERPL-MCNC: 15 MG/DL (ref 7–18)
CALCIUM SERPL-MCNC: 8.2 MG/DL (ref 8.8–10.2)
CHLORIDE SERPL-SCNC: 104 MEQ/L (ref 98–107)
CO2 SERPL-SCNC: 27 MEQ/L (ref 21–32)
CREAT SERPL-MCNC: 0.65 MG/DL (ref 0.7–1.3)
EOSINOPHIL # BLD AUTO: 0.2 K/MM3 (ref 0–0.5)
EOSINOPHIL NFR BLD AUTO: 2.2 % (ref 0–3)
ERYTHROCYTE [DISTWIDTH] IN BLOOD BY AUTOMATED COUNT: 13.1 % (ref 11.5–14.5)
GFR SERPL CREATININE-BSD FRML MDRD: > 60 ML/MIN/{1.73_M2} (ref 42–?)
GLUCOSE SERPL-MCNC: 118 MG/DL (ref 83–110)
LARGE UNSTAINED CELL #: 0.2 K/MM3 (ref 0–0.4)
LARGE UNSTAINED CELL %: 2.9 % (ref 0–4)
LYMPHOCYTES # BLD AUTO: 1.3 K/MM3 (ref 1.5–4.5)
LYMPHOCYTES NFR BLD AUTO: 17 % (ref 24–44)
MCH RBC QN AUTO: 28.5 PG (ref 27–33)
MCHC RBC AUTO-ENTMCNC: 33.4 G/DL (ref 32–36.5)
MCV RBC AUTO: 85.5 FL (ref 80–96)
MONOCYTES # BLD AUTO: 0.5 K/MM3 (ref 0–0.8)
MONOCYTES NFR BLD AUTO: 6.1 % (ref 0–5)
NEUTROPHILS # BLD AUTO: 5.6 K/MM3 (ref 1.8–7.7)
NEUTROPHILS NFR BLD AUTO: 71.5 % (ref 36–66)
PLATELET # BLD AUTO: 151 K/MM3 (ref 150–450)
POTASSIUM SERPL-SCNC: 3.4 MEQ/L (ref 3.5–5.1)
PROT SERPL-MCNC: 6.6 GM/DL (ref 6.4–8.2)
SODIUM SERPL-SCNC: 140 MEQ/L (ref 136–145)
WBC # BLD AUTO: 7.8 K/MM3 (ref 4–10)

## 2017-03-05 RX ADMIN — Medication SCH UNITS: at 08:59

## 2017-03-05 RX ADMIN — INSULIN LISPRO SCH UNITS: 100 INJECTION, SOLUTION INTRAVENOUS; SUBCUTANEOUS at 12:00

## 2017-03-05 RX ADMIN — DOCUSATE SODIUM SCH MG: 100 CAPSULE, LIQUID FILLED ORAL at 08:59

## 2017-03-05 RX ADMIN — INSULIN LISPRO SCH UNITS: 100 INJECTION, SOLUTION INTRAVENOUS; SUBCUTANEOUS at 08:59

## 2017-03-05 RX ADMIN — LISINOPRIL SCH MG: 40 TABLET ORAL at 08:59

## 2017-03-05 NOTE — DSES
DATE OF ADMISSION:  02/16/2017

DATE OF DISCHARGE:  03/05/2017

 

ATTENDING PHYSICIAN:  Dr. Дмитрий Black, Dr. Alfie Varghese

 

PRIMARY CARE PHYSICIAN:  Dr. Ryan Gleason

 

REFERRING PHYSICIANS:  None.

 

CONSULTING PHYSICIANS:  Dr. Mohsin Ali and Dr. Viki Patel

 

CONDITION ON DISCHARGE:  Stable.

 

FINAL DIAGNOSIS:  Frequent falls, possibly secondary to arachnoid cyst with

compression of adjacent parietal lobe.

 

PROCEDURES:  Patient had a frameless stereotactic right parietal craniotomy with

marsupialization of subarachnoid cyst to the general subarachnoid space on

03/02/2017.

 

HISTORY OF PRESENT ILLNESS:  The patient is a 76-year-old male with a past

medical history of hypertension, dyslipidemia, diabetes mellitus type 2,

dementia, who presented to the emergency room with complaints of frequent falls

at least three times for the past 3-4 weeks.  He had recent history of right rib

fractures because of the fall.  He admits that he did not have any loss of

consciousness or symptoms with the fall.  Patient denied any chest pain or head

trauma.

 

HOSPITAL COURSE:

1.  Frequent falls, possibly secondary to arachnoid cyst with compression of

adjacent parietal lobe, less likely secondary to dehydration.  Presented with at

least three falls over 1 month duration.  Found to be hypertensive in the

emergency room.  Physical revealed no focal neurologic deficits.  CT revealed

arachnoid cyst.  MRI on 02/15/2017, revealed a 4.4 x 5.3 x 4.1 cm arachnoid cyst

in the posterior right parietal lobe.  Cisternogram was acquired over one week

which revealed intraparenchymal cyst in the right hemisphere which does not

communicate with the cerebral spinal fluid (CSF).  Patient went for frameless

stereotactic right parietal craniotomy with marsupialization of the subarachnoid

cyst with Dr. Patel.  Dr. Patel was on consult.  Patient was started on

physical therapy and was cleared by physical therapy after surgery.

 

2.  Dementia, possible secondary to Alzheimer's.  Evaluated by neurology and

neurology was on board to evaluate for reversible cognitive dementia which was

never found.

 

3.  Vitamin D deficiency.  Continue with vitamin D supplementation.

 

4.  Status post acute kidney injury likely secondary to prerenal etiology.

Creatinine has trended back to normal status post intravenous (IV) fluid

hydration.

 

5.  Status post hypotension, possibly secondary to dehydration.  Status post IV

fluid hydration.

 

6.  Diabetes mellitus type 2.  A1c of 6.3.  Continue with insulin sliding scale.

 

7.  Dyslipidemia.  Continue with simvastatin.

 

8.  Hypertension.  Blood pressure remains well controlled.  He is continued with

lisinopril 40 mg by mouth daily.

 

9.  Dementia.  Continue with memantine.

 

10.  Subclinical hyperthyroidism.

 

11.  Deep venous thrombosis (DVT) prophylaxis.  Continue with sequential

compression devices.

 

DISCHARGE MEDICATIONS:

Patient was discharged home with the following medication list:

- aspirin 81 mg by mouth daily

- Lumigan one drop in each eye daily

- Combigan one drop in each eye daily

- docusate 100 mg by mouth twice a day

- memantine 28 mg by mouth daily

- metformin 500 mg by mouth daily

- multivitamin one tablet by mouth daily

- simvastatin 20 mg by mouth nightly

 

Stopped medications include:

- hydrochlorothiazide 6.25 mg by mouth daily

- lisinopril 10 mg by mouth daily

 

New prescriptions provided include:

- lisinopril 40 mg by mouth daily

- vitamin D 1000 units by mouth daily

 

DISCHARGE INSTRUCTIONS:  Patient was advised to followup with her primary care

provider and neurosurgery within the next 7 days.  He has been advised to remain

compliant with treatment plan and recommendations and return to the emergency

room if he experiences any problems.

 

TIME SPENT ON DISCHARGE:  35 minutes.

## 2017-03-05 NOTE — REPUSA
CLINICAL HISTORY: Status post surgical changes.

TECHNIQUE: Multiple axial brain CT scan sections were obtained from base to vertex without contrast a
dministration.

COMMENTS:

Comparison is made to the prior exam performed on 2/15/2017.

Recent right pterional craniotomy.

Moderate pneumocephaly.

CSF density right parietal extra-axial fluid collection measuring 1.1 cm in its maximum thickness.

No significant associated mass effect.

Interval appearance of a left extra-axial CSF density fluid collection measuring 4 mm in its largest 
transverse dimension. No associated mass effect.

Air density is identified in the right frontal/parietal CSF density cyst. It measures 5.3 x 3.5 cm in
 its largest the anteroposterior and transverse dimensions respectively. Minimal hemorrhagic products
 are noted in the posterior aspect of the surgical cavity.

The sinuses and mastoid air cells are patent.

IMPRESSION:

Recent right pterional craniotomy.

Moderate pneumocephaly. This was not present on prior exam.

Interval appearance of bilateral extra-axial CSF fluid collections. No significant mass effect.

Surgical changes of the patient described right frontal/parietal cyst.

Minimal hemorrhagic products in the posterior aspect of the surgical cavity.

Thank you for your kind referral of this patient.

     Electronically signed by DINA VERA MD on 03/05/2017 07:21:52 AM ET

## 2017-03-06 NOTE — IPN
DATE:  03/05/2017

 

NEUROSURGERY

 

The patient is seen at the bedside today.  He is alert and oriented.  The patient

is sitting in his chair comfortably eating lunch.  The patient appears to be

doing very well.  He is participating in conversation appropriately.  He appears

to be excited about his discharge.  He notes no new symptoms today.  States that

he has mild pain in the surgical incision, mainly when lying down when he is

leaning toward the incision; however, he states that this pain is improving every

day.  He reports that his physical therapy (PT) went well.  States that he did

not have any difficulty with ambulation and that he feels stable on his feet.

Overall, the patient states that he feels well.

 

No new findings examination today.  His incision looks to be healing well.  There

is trace dried blood spotting on his bandage.  No erythema noted outside the

bandage.  No tenderness around the incision site.  It is noted that his

temperature was 99.7 on 03/04/2017; however, it has been within normal limits

since that time.

 

We will followup with the patient in the office after discharge.

## 2017-03-06 NOTE — RO
DATE OF PROCEDURE:  03/02/2017

 

PREOPERATIVE DIAGNOSIS:  Right posterior parietal arachnoid cyst versus cystic

mass with mass effect.

 

POSTOPERATIVE DIAGNOSIS:  Right posterior parietal arachnoid cyst versus cystic

mass with mass effect.

 

PROCEDURE:  Frameless computer-assisted stereotactic right posterior parietal

craniotomy using Stealth navigation system with marsupialization of the cyst into

the general subarachnoid space, multiple biopsies of the wall of the cyst and

that of the arachnoid matter, duraplasty, cranioplasty.

 

SURGEON:  Viki Patel MD

 

ASSISTANT:  STELLA Norris

 

ANESTHESIA:  General.

 

DESCRIPTION OF PROCEDURE:  Patient was seen in the preoperative area, and an

extensive family conference was held again.  Patient and family were aware of all

options, risks, scope, expected outcome, sequelae, and complications of the

proposed salvage procedure.  Patient has been showing some signs of rapid decline

in his intellectual functions as well as imbalance.  His workup showed a

nonfilling cystic mass in the right posterior parietal region on the

cisternogram.  The CT scan and the MRI show considerable pressure on the

adjoining brain from the cystic lesions.  Spinal fluid was sent for Alzheimer

markers at the time of his cisternogram.

 

Patient and the family were aware of all options, risks, scope, expected outcome,

sequelae, and all possible complications of surgery.  They understood no

guarantees of any kind could be given, including that of establishing a

pathological diagnosis.  They understood the risks of surgery include, but are

not limited to, death, paralysis, quadriplegia, persistent vegetative state,

seizure disorder, status epilepticus, loss of any or all vital bodily functions,

intracranial bleeding, infection, deep venous thrombosis (DVT), pulmonary

embolism (PE), myocardial infarction (MI), and/or any catastrophic sequelae.  The

patient and family clearly understood the surgery is not curative.  Patient and

family wished to proceed with surgery.  After all matters pertaining to the

surgery, anesthesia, and followup care had been discussed again with the patient

and family, he was taken to the operating room at their request and after

informed consent.

 

Once in the operating room, general endotracheal anesthesia was given by the

anesthesia service.  Patient's head was then positioned in a [place] headrest

with three-point skeletal fixation system.  Stealth navigation hardware was

attached to the [place] headrest.  The data received from the computers and the

MRI was fed into the computers in the operating room to get live navigation.

 

Under live navigation, a skin incision was given for a few inches just above the

right parietal buttress.  Galeal region incised.  Periosteum was scraped off the

skull vault, and wound edges were held apart with the help of self-retaining

retractors.  Prior to the incision, the incision line was infiltrated with 1%

lidocaine with 1:100,00 epinephrine.  A catia hole was created in the posterior

aspect of the exposed skull vault; and using craniotome, an oval shaped bone flap

was raised.  There was considerable brain swelling.  The dura was opened, and a

large subarachnoid cyst appeared to be trapped.  There was no egress of

cerebrospinal fluid (CSF) flow from it.  It was quite ballooned out and

displacing the adjoining brain.  Superior portion of the cyst was entered after

excising several fragments of the wall of the cyst, and it was sent for electron

microscopy to rule out presence of arachnoidal villi, which would indicate

possible need for cystoperitoneal shunt down the road.  Some pieces from

adjoining arachnoid were also sent for the electron microscopy for comparison.

 

At this time, the wound was closed in anatomic layers.  Dural suture line was

infiltrated with DuraSeal.  The dura was tacked to the skull vault using flap

#4-0 silk.  The skull flap was placed back with the help of two Medicon plates

and six screws.  Subsequently, the galea and the skin were closed in two layers.

Patient tolerated the procedure well and was transferred to the recovery room in

stable condition.

 

Blood loss appeared negligible, maybe 10 or 15 mL, if that.  There was not much

in the container.

## 2017-03-09 LAB
Lab: (no result)

## 2017-03-29 ENCOUNTER — HOSPITAL ENCOUNTER (OUTPATIENT)
Dept: HOSPITAL 53 - M ST | Age: 77
LOS: 2 days | End: 2017-03-31
Attending: NURSE PRACTITIONER
Payer: MEDICARE

## 2017-03-29 DIAGNOSIS — Z51.89: Primary | ICD-10-CM

## 2017-03-29 DIAGNOSIS — R47.01: ICD-10-CM

## 2017-03-29 DIAGNOSIS — G31.84: ICD-10-CM

## 2017-03-29 PROCEDURE — 92507 TX SP LANG VOICE COMM INDIV: CPT

## 2017-03-29 PROCEDURE — 92523 SPEECH SOUND LANG COMPREHEN: CPT

## 2017-03-31 ENCOUNTER — HOSPITAL ENCOUNTER (OUTPATIENT)
Dept: HOSPITAL 53 - M RAD | Age: 77
End: 2017-03-31
Attending: NEUROLOGICAL SURGERY
Payer: MEDICARE

## 2017-03-31 DIAGNOSIS — R47.01: Primary | ICD-10-CM

## 2017-03-31 DIAGNOSIS — G31.84: ICD-10-CM

## 2017-03-31 NOTE — REP
MR BRAIN WITHOUT CONTRAST:

 

HISTORY: Confusion.

 

COMPARISON: MR 02/15/2017 and CT 03/05/2017

 

Areas of increased signal intensity on T2 weighted images are present in the

periventricular and subcortical white matter. This represents small vessel

ischemic disease. There is no intraparenchymal hemorrhage, infarct, or mass. The

ventricular system and cortical sulci are dilated consistent with mild volume

loss. An arachnoid cyst is present overlying the posterior right parietal lobe.

The cyst measures 3.9 cm in transverse x 4.6 cm in AP x 3.2 cm in cephalocaudal

dimensions and is decreased in size compared to the previous study. A small

amount of gliosis and chronic hemorrhage are adjacent to the cyst. A chronic

subdural hematoma with a small amount of rebleeding is present over the left

cerebral hemisphere. This measures 1.4 cm in width and is decreased in size

compared to the previous study. A chronic subdural hematoma is present overlying

the anterior right frontal lobe. This measures 9 mm in width and is decreased in

size compared to the previous study. Mucosal thickening is present in the

maxillary sinuses.

 

IMPRESSION:1. Small vessel ischemic disease.

 

2. Mild volume loss.

 

3. Right parietal arachnoid cyst decreased in size compared to the previous

study.

 

4. There is a 1.4 cm chronic subdural hematoma over the left cerebral hemisphere

with a small amount of rebleeding. That is decreased in sized compared to the

previous study.

 

5. Small 9 mm chronic subdural hematoma over the anterior right frontal lobe,

decreased in size compared to the previous study. Discussed with Dr. Patel at 5

p.m. this date.

 

 

Signed by

Angus Salcido MD 04/03/2017 09:18 A

## 2017-04-27 ENCOUNTER — HOSPITAL ENCOUNTER (OUTPATIENT)
Dept: HOSPITAL 53 - M ST | Age: 77
LOS: 3 days | End: 2017-04-30
Attending: NURSE PRACTITIONER
Payer: MEDICARE

## 2017-04-27 DIAGNOSIS — G31.84: ICD-10-CM

## 2017-04-27 DIAGNOSIS — Z51.89: Primary | ICD-10-CM

## 2017-04-27 DIAGNOSIS — R47.01: ICD-10-CM

## 2017-04-27 DIAGNOSIS — R26.9: ICD-10-CM

## 2017-04-27 DIAGNOSIS — G93.5: ICD-10-CM

## 2017-04-27 DIAGNOSIS — G93.0: ICD-10-CM

## 2017-04-27 PROCEDURE — 97532: CPT

## 2017-04-27 PROCEDURE — 97162 PT EVAL MOD COMPLEX 30 MIN: CPT

## 2017-04-27 PROCEDURE — 92507 TX SP LANG VOICE COMM INDIV: CPT

## 2017-04-27 PROCEDURE — 97110 THERAPEUTIC EXERCISES: CPT

## 2017-05-09 ENCOUNTER — HOSPITAL ENCOUNTER (OUTPATIENT)
Dept: HOSPITAL 53 - M LAB | Age: 77
End: 2017-05-09
Attending: NEUROLOGICAL SURGERY
Payer: MEDICARE

## 2017-05-09 DIAGNOSIS — F03.90: ICD-10-CM

## 2017-05-09 DIAGNOSIS — G91.0: ICD-10-CM

## 2017-05-09 DIAGNOSIS — Z01.818: Primary | ICD-10-CM

## 2017-05-09 DIAGNOSIS — G96.9: ICD-10-CM

## 2017-05-09 LAB
ALBUMIN SERPL BCG-MCNC: 3.9 GM/DL (ref 3.2–5.2)
ALBUMIN/GLOB SERPL: 1.3 {RATIO} (ref 1–1.93)
ALP SERPL-CCNC: 107 U/L (ref 45–117)
ALT SERPL W P-5'-P-CCNC: 34 U/L (ref 12–78)
ANION GAP SERPL CALC-SCNC: 7 MEQ/L (ref 8–16)
AST SERPL-CCNC: 22 U/L (ref 15–37)
BASOPHILS # BLD AUTO: 0 K/MM3 (ref 0–0.2)
BASOPHILS NFR BLD AUTO: 0.9 % (ref 0–1)
BILIRUB SERPL-MCNC: 0.4 MG/DL (ref 0.2–1)
BUN SERPL-MCNC: 25 MG/DL (ref 7–18)
CALCIUM SERPL-MCNC: 8.5 MG/DL (ref 8.8–10.2)
CAOX CRY URNS QL MICRO: (no result)
CHLORIDE SERPL-SCNC: 103 MEQ/L (ref 98–107)
CLOSURE TME COLL+ADP BLD: 128 SECONDS (ref 56–103)
CO2 SERPL-SCNC: 31 MEQ/L (ref 21–32)
CREAT SERPL-MCNC: 0.76 MG/DL (ref 0.7–1.3)
EOSINOPHIL # BLD AUTO: 0.3 K/MM3 (ref 0–0.5)
EOSINOPHIL NFR BLD AUTO: 5.5 % (ref 0–3)
ERYTHROCYTE [DISTWIDTH] IN BLOOD BY AUTOMATED COUNT: 13 % (ref 11.5–14.5)
GFR SERPL CREATININE-BSD FRML MDRD: > 60 ML/MIN/{1.73_M2} (ref 42–?)
GLUCOSE SERPL-MCNC: 142 MG/DL (ref 83–110)
INR PPP: 0.99
LARGE UNSTAINED CELL #: 0.1 K/MM3 (ref 0–0.4)
LARGE UNSTAINED CELL %: 2.7 % (ref 0–4)
LYMPHOCYTES # BLD AUTO: 1.4 K/MM3 (ref 1.5–4.5)
LYMPHOCYTES NFR BLD AUTO: 29.3 % (ref 24–44)
MCH RBC QN AUTO: 29.4 PG (ref 27–33)
MCHC RBC AUTO-ENTMCNC: 33.4 G/DL (ref 32–36.5)
MCV RBC AUTO: 88 FL (ref 80–96)
MONOCYTES # BLD AUTO: 0.2 K/MM3 (ref 0–0.8)
MONOCYTES NFR BLD AUTO: 4.8 % (ref 0–5)
NEUTROPHILS # BLD AUTO: 2.7 K/MM3 (ref 1.8–7.7)
NEUTROPHILS NFR BLD AUTO: 56.8 % (ref 36–66)
PLATELET # BLD AUTO: 185 K/MM3 (ref 150–450)
POTASSIUM SERPL-SCNC: 4.2 MEQ/L (ref 3.5–5.1)
PROT SERPL-MCNC: 6.9 GM/DL (ref 6.4–8.2)
SODIUM SERPL-SCNC: 141 MEQ/L (ref 136–145)
WBC # BLD AUTO: 4.8 K/MM3 (ref 4–10)

## 2017-05-09 NOTE — REP
Chest x-ray:  Two views.

 

History:  Communicating hydrocephalus.

 

Comparison study February 15, 2017.

 

Findings:  The lungs are symmetrically aerated and free of infiltrate.  Pleural

angles are sharp.  Heart is not enlarged.  There are mild degenerative changes in

the thoracic spine and aorta.  There are two old somewhat displaced right

posterolateral rib fractures.

 

Impression:

 

No active cardiopulmonary disease.

 

 

Signed by

Yaakov Rutherford MD 05/09/2017 01:49 P

## 2017-05-09 NOTE — ECGEPIP
Stationary ECG Study

                              [facility]

                                       

                                       Test Date:    2017

Pat Name:     CHET OSWALD          Department:   

Patient ID:   [id number]                 Room:         -

Gender:       M                        Technician:   LEI

:          1940               Requested By: ELIE SYED

Order Number: [id number]     Reading MD:   Nj Bosch

                                 Measurements

Intervals                              Axis          

Rate:         56                       P:            58

AZ:           188                      QRS:          24

QRSD:         102                      T:            54

QT:           392                                    

QTc:          380                                    

                           Interpretive Statements

SINUS BRADYCARDIA

POSSIBLE LEFT VENTRICULAR HYPERTROPHY

SIMILAR 2/15/17

Electronically Signed On 2017 19:00:16 EDT by Nj Bosch

## 2017-05-24 ENCOUNTER — HOSPITAL ENCOUNTER (OUTPATIENT)
Dept: HOSPITAL 53 - M LAB REF | Age: 77
End: 2017-05-24
Attending: NEUROLOGICAL SURGERY
Payer: MEDICARE

## 2017-05-24 DIAGNOSIS — Z01.818: Primary | ICD-10-CM

## 2017-05-31 ENCOUNTER — HOSPITAL ENCOUNTER (OUTPATIENT)
Dept: HOSPITAL 53 - M LAB | Age: 77
End: 2017-05-31
Attending: NEUROLOGICAL SURGERY
Payer: MEDICARE

## 2017-05-31 DIAGNOSIS — Z79.82: Primary | ICD-10-CM

## 2017-06-02 ENCOUNTER — HOSPITAL ENCOUNTER (OUTPATIENT)
Dept: HOSPITAL 53 - M OR | Age: 77
LOS: 3 days | Discharge: HOME | End: 2017-06-05
Attending: NEUROLOGICAL SURGERY
Payer: MEDICARE

## 2017-06-02 VITALS — SYSTOLIC BLOOD PRESSURE: 136 MMHG | DIASTOLIC BLOOD PRESSURE: 71 MMHG

## 2017-06-02 VITALS — DIASTOLIC BLOOD PRESSURE: 60 MMHG | SYSTOLIC BLOOD PRESSURE: 111 MMHG

## 2017-06-02 VITALS — DIASTOLIC BLOOD PRESSURE: 90 MMHG | SYSTOLIC BLOOD PRESSURE: 141 MMHG

## 2017-06-02 VITALS — HEIGHT: 65 IN | BODY MASS INDEX: 31.29 KG/M2 | WEIGHT: 187.83 LBS

## 2017-06-02 VITALS — SYSTOLIC BLOOD PRESSURE: 149 MMHG | DIASTOLIC BLOOD PRESSURE: 95 MMHG

## 2017-06-02 VITALS — DIASTOLIC BLOOD PRESSURE: 73 MMHG | SYSTOLIC BLOOD PRESSURE: 128 MMHG

## 2017-06-02 VITALS — DIASTOLIC BLOOD PRESSURE: 58 MMHG | SYSTOLIC BLOOD PRESSURE: 121 MMHG

## 2017-06-02 VITALS — DIASTOLIC BLOOD PRESSURE: 64 MMHG | SYSTOLIC BLOOD PRESSURE: 133 MMHG

## 2017-06-02 VITALS — SYSTOLIC BLOOD PRESSURE: 139 MMHG | DIASTOLIC BLOOD PRESSURE: 64 MMHG

## 2017-06-02 VITALS — DIASTOLIC BLOOD PRESSURE: 55 MMHG | SYSTOLIC BLOOD PRESSURE: 108 MMHG

## 2017-06-02 VITALS — DIASTOLIC BLOOD PRESSURE: 73 MMHG | SYSTOLIC BLOOD PRESSURE: 159 MMHG

## 2017-06-02 VITALS — DIASTOLIC BLOOD PRESSURE: 88 MMHG | SYSTOLIC BLOOD PRESSURE: 141 MMHG

## 2017-06-02 VITALS — SYSTOLIC BLOOD PRESSURE: 143 MMHG | DIASTOLIC BLOOD PRESSURE: 75 MMHG

## 2017-06-02 VITALS — DIASTOLIC BLOOD PRESSURE: 87 MMHG | SYSTOLIC BLOOD PRESSURE: 142 MMHG

## 2017-06-02 VITALS — SYSTOLIC BLOOD PRESSURE: 138 MMHG | DIASTOLIC BLOOD PRESSURE: 72 MMHG

## 2017-06-02 VITALS — SYSTOLIC BLOOD PRESSURE: 126 MMHG | DIASTOLIC BLOOD PRESSURE: 62 MMHG

## 2017-06-02 VITALS — DIASTOLIC BLOOD PRESSURE: 63 MMHG | SYSTOLIC BLOOD PRESSURE: 126 MMHG

## 2017-06-02 DIAGNOSIS — Z87.81: ICD-10-CM

## 2017-06-02 DIAGNOSIS — Z94.7: ICD-10-CM

## 2017-06-02 DIAGNOSIS — Z96.1: ICD-10-CM

## 2017-06-02 DIAGNOSIS — Z79.82: ICD-10-CM

## 2017-06-02 DIAGNOSIS — H40.9: ICD-10-CM

## 2017-06-02 DIAGNOSIS — Z79.84: ICD-10-CM

## 2017-06-02 DIAGNOSIS — G31.84: ICD-10-CM

## 2017-06-02 DIAGNOSIS — E78.00: ICD-10-CM

## 2017-06-02 DIAGNOSIS — Z79.899: ICD-10-CM

## 2017-06-02 DIAGNOSIS — G93.0: ICD-10-CM

## 2017-06-02 DIAGNOSIS — G91.0: Primary | ICD-10-CM

## 2017-06-02 DIAGNOSIS — Z87.891: ICD-10-CM

## 2017-06-02 DIAGNOSIS — E78.2: ICD-10-CM

## 2017-06-02 DIAGNOSIS — E11.9: ICD-10-CM

## 2017-06-02 DIAGNOSIS — G30.9: ICD-10-CM

## 2017-06-02 DIAGNOSIS — I10: ICD-10-CM

## 2017-06-02 DIAGNOSIS — E07.9: ICD-10-CM

## 2017-06-02 LAB
APPEARANCE CSF: (no result)
COLOR CSF: (no result)
CSF DIFF IF INDICATED?: (no result)
GLUCOSE CSF-MCNC: 54 MG/DL (ref 40–75)
Lab: (no result)
Lab: 10 (ref 0–10)
Lab: 10 (ref 0–10)
Lab: 4525 /MM3 (ref 0–0)
RBC # CSF MANUAL: (no result) /MM3 (ref 0–0)
TUBE # CSF: (no result)
WBC # CSF MANUAL: 33 /UL (ref 0–10)

## 2017-06-02 PROCEDURE — 85576 BLOOD PLATELET AGGREGATION: CPT

## 2017-06-02 PROCEDURE — 82945 GLUCOSE OTHER FLUID: CPT

## 2017-06-02 PROCEDURE — 85025 COMPLETE CBC W/AUTO DIFF WBC: CPT

## 2017-06-02 PROCEDURE — 36415 COLL VENOUS BLD VENIPUNCTURE: CPT

## 2017-06-02 PROCEDURE — 87205 SMEAR GRAM STAIN: CPT

## 2017-06-02 PROCEDURE — 97161 PT EVAL LOW COMPLEX 20 MIN: CPT

## 2017-06-02 PROCEDURE — 80053 COMPREHEN METABOLIC PANEL: CPT

## 2017-06-02 PROCEDURE — 87070 CULTURE OTHR SPECIMN AEROBIC: CPT

## 2017-06-02 PROCEDURE — 75809 NONVASCULAR SHUNT X-RAY: CPT

## 2017-06-02 PROCEDURE — 49406 IMAGE CATH FLUID PERI/RETRO: CPT

## 2017-06-02 PROCEDURE — 84157 ASSAY OF PROTEIN OTHER: CPT

## 2017-06-02 PROCEDURE — 93005 ELECTROCARDIOGRAM TRACING: CPT

## 2017-06-02 PROCEDURE — 62223 ESTABLISH BRAIN CAVITY SHUNT: CPT

## 2017-06-02 PROCEDURE — 83520 IMMUNOASSAY QUANT NOS NONAB: CPT

## 2017-06-02 PROCEDURE — 92523 SPEECH SOUND LANG COMPREHEN: CPT

## 2017-06-02 PROCEDURE — 89051 BODY FLUID CELL COUNT: CPT

## 2017-06-02 PROCEDURE — 87015 SPECIMEN INFECT AGNT CONCNTJ: CPT

## 2017-06-02 PROCEDURE — 96125 COGNITIVE TEST BY HC PRO: CPT

## 2017-06-02 PROCEDURE — 70450 CT HEAD/BRAIN W/O DYE: CPT

## 2017-06-02 RX ADMIN — ASPIRIN SCH MG: 81 TABLET ORAL at 17:26

## 2017-06-02 RX ADMIN — LISINOPRIL SCH MG: 40 TABLET ORAL at 13:36

## 2017-06-02 RX ADMIN — INSULIN LISPRO SCH UNITS: 100 INJECTION, SOLUTION INTRAVENOUS; SUBCUTANEOUS at 12:00

## 2017-06-02 RX ADMIN — MEMANTINE SCH MG: 5 TABLET ORAL at 20:59

## 2017-06-02 RX ADMIN — DOCUSATE SODIUM SCH MG: 100 CAPSULE, LIQUID FILLED ORAL at 20:59

## 2017-06-02 RX ADMIN — INSULIN LISPRO SCH UNITS: 100 INJECTION, SOLUTION INTRAVENOUS; SUBCUTANEOUS at 17:27

## 2017-06-02 RX ADMIN — SIMVASTATIN SCH MG: 20 TABLET, FILM COATED ORAL at 20:59

## 2017-06-02 RX ADMIN — HYDRALAZINE HYDROCHLORIDE SCH MG: 20 INJECTION INTRAMUSCULAR; INTRAVENOUS at 20:00

## 2017-06-02 RX ADMIN — CEFAZOLIN SODIUM SCH MLS/HR: 1 INJECTION, POWDER, FOR SOLUTION INTRAMUSCULAR; INTRAVENOUS at 13:37

## 2017-06-02 RX ADMIN — CEFAZOLIN SODIUM SCH MLS/HR: 1 INJECTION, POWDER, FOR SOLUTION INTRAMUSCULAR; INTRAVENOUS at 20:58

## 2017-06-02 RX ADMIN — LATANOPROST SCH DROP: 50 SOLUTION OPHTHALMIC at 20:59

## 2017-06-03 VITALS — SYSTOLIC BLOOD PRESSURE: 118 MMHG | DIASTOLIC BLOOD PRESSURE: 58 MMHG

## 2017-06-03 VITALS — DIASTOLIC BLOOD PRESSURE: 80 MMHG | SYSTOLIC BLOOD PRESSURE: 169 MMHG

## 2017-06-03 VITALS — DIASTOLIC BLOOD PRESSURE: 65 MMHG | SYSTOLIC BLOOD PRESSURE: 127 MMHG

## 2017-06-03 VITALS — SYSTOLIC BLOOD PRESSURE: 129 MMHG | DIASTOLIC BLOOD PRESSURE: 62 MMHG

## 2017-06-03 VITALS — DIASTOLIC BLOOD PRESSURE: 60 MMHG | SYSTOLIC BLOOD PRESSURE: 127 MMHG

## 2017-06-03 VITALS — SYSTOLIC BLOOD PRESSURE: 165 MMHG | DIASTOLIC BLOOD PRESSURE: 70 MMHG

## 2017-06-03 VITALS — SYSTOLIC BLOOD PRESSURE: 129 MMHG | DIASTOLIC BLOOD PRESSURE: 67 MMHG

## 2017-06-03 VITALS — DIASTOLIC BLOOD PRESSURE: 65 MMHG | SYSTOLIC BLOOD PRESSURE: 138 MMHG

## 2017-06-03 VITALS — SYSTOLIC BLOOD PRESSURE: 166 MMHG | DIASTOLIC BLOOD PRESSURE: 98 MMHG

## 2017-06-03 VITALS — SYSTOLIC BLOOD PRESSURE: 149 MMHG | DIASTOLIC BLOOD PRESSURE: 103 MMHG

## 2017-06-03 VITALS — SYSTOLIC BLOOD PRESSURE: 149 MMHG | DIASTOLIC BLOOD PRESSURE: 77 MMHG

## 2017-06-03 VITALS — DIASTOLIC BLOOD PRESSURE: 55 MMHG | SYSTOLIC BLOOD PRESSURE: 116 MMHG

## 2017-06-03 VITALS — SYSTOLIC BLOOD PRESSURE: 143 MMHG | DIASTOLIC BLOOD PRESSURE: 65 MMHG

## 2017-06-03 VITALS — DIASTOLIC BLOOD PRESSURE: 93 MMHG | SYSTOLIC BLOOD PRESSURE: 167 MMHG

## 2017-06-03 VITALS — DIASTOLIC BLOOD PRESSURE: 53 MMHG | SYSTOLIC BLOOD PRESSURE: 115 MMHG

## 2017-06-03 LAB
ALBUMIN SERPL BCG-MCNC: 3.7 GM/DL (ref 3.2–5.2)
ALBUMIN/GLOB SERPL: 1.06 {RATIO} (ref 1–1.93)
ALP SERPL-CCNC: 92 U/L (ref 45–117)
ALT SERPL W P-5'-P-CCNC: 33 U/L (ref 12–78)
ANION GAP SERPL CALC-SCNC: 7 MEQ/L (ref 8–16)
AST SERPL-CCNC: 23 U/L (ref 15–37)
BASOPHILS # BLD AUTO: 0 K/MM3 (ref 0–0.2)
BASOPHILS NFR BLD AUTO: 0.1 % (ref 0–1)
BILIRUB SERPL-MCNC: 0.7 MG/DL (ref 0.2–1)
BUN SERPL-MCNC: 14 MG/DL (ref 7–18)
CALCIUM SERPL-MCNC: 9 MG/DL (ref 8.8–10.2)
CHLORIDE SERPL-SCNC: 102 MEQ/L (ref 98–107)
CO2 SERPL-SCNC: 30 MEQ/L (ref 21–32)
CREAT SERPL-MCNC: 0.91 MG/DL (ref 0.7–1.3)
EOSINOPHIL # BLD AUTO: 0.1 K/MM3 (ref 0–0.5)
EOSINOPHIL NFR BLD AUTO: 0.6 % (ref 0–3)
ERYTHROCYTE [DISTWIDTH] IN BLOOD BY AUTOMATED COUNT: 13.1 % (ref 11.5–14.5)
GFR SERPL CREATININE-BSD FRML MDRD: > 60 ML/MIN/{1.73_M2} (ref 42–?)
GLUCOSE SERPL-MCNC: 107 MG/DL (ref 83–110)
LARGE UNSTAINED CELL #: 0.1 K/MM3 (ref 0–0.4)
LARGE UNSTAINED CELL %: 1.3 % (ref 0–4)
LYMPHOCYTES # BLD AUTO: 1.9 K/MM3 (ref 1.5–4.5)
LYMPHOCYTES NFR BLD AUTO: 20.2 % (ref 24–44)
MCH RBC QN AUTO: 28.5 PG (ref 27–33)
MCHC RBC AUTO-ENTMCNC: 33.7 G/DL (ref 32–36.5)
MCV RBC AUTO: 84.6 FL (ref 80–96)
MONOCYTES # BLD AUTO: 0.7 K/MM3 (ref 0–0.8)
MONOCYTES NFR BLD AUTO: 7.2 % (ref 0–5)
NEUTROPHILS # BLD AUTO: 6.6 K/MM3 (ref 1.8–7.7)
NEUTROPHILS NFR BLD AUTO: 70.6 % (ref 36–66)
PLATELET # BLD AUTO: 193 K/MM3 (ref 150–450)
POTASSIUM SERPL-SCNC: 4.4 MEQ/L (ref 3.5–5.1)
PROT SERPL-MCNC: 7.2 GM/DL (ref 6.4–8.2)
SODIUM SERPL-SCNC: 139 MEQ/L (ref 136–145)
WBC # BLD AUTO: 9.3 K/MM3 (ref 4–10)

## 2017-06-03 RX ADMIN — METFORMIN HYDROCHLORIDE SCH MG: 500 TABLET ORAL at 08:20

## 2017-06-03 RX ADMIN — DOCUSATE SODIUM SCH MG: 100 CAPSULE, LIQUID FILLED ORAL at 08:20

## 2017-06-03 RX ADMIN — ACETAMINOPHEN PRN MG: 500 TABLET ORAL at 03:44

## 2017-06-03 RX ADMIN — SIMVASTATIN SCH MG: 20 TABLET, FILM COATED ORAL at 22:06

## 2017-06-03 RX ADMIN — ACETAMINOPHEN PRN MG: 500 TABLET ORAL at 16:49

## 2017-06-03 RX ADMIN — CEFAZOLIN SODIUM SCH MLS/HR: 1 INJECTION, POWDER, FOR SOLUTION INTRAMUSCULAR; INTRAVENOUS at 01:23

## 2017-06-03 RX ADMIN — DOCUSATE SODIUM SCH MG: 100 CAPSULE, LIQUID FILLED ORAL at 22:06

## 2017-06-03 RX ADMIN — HYDRALAZINE HYDROCHLORIDE SCH MG: 25 TABLET, FILM COATED ORAL at 22:07

## 2017-06-03 RX ADMIN — LISINOPRIL SCH MG: 40 TABLET ORAL at 08:20

## 2017-06-03 RX ADMIN — HYDRALAZINE HYDROCHLORIDE SCH MG: 25 TABLET, FILM COATED ORAL at 16:45

## 2017-06-03 RX ADMIN — HYDRALAZINE HYDROCHLORIDE SCH MG: 20 INJECTION INTRAMUSCULAR; INTRAVENOUS at 03:51

## 2017-06-03 RX ADMIN — INSULIN LISPRO SCH UNITS: 100 INJECTION, SOLUTION INTRAVENOUS; SUBCUTANEOUS at 08:19

## 2017-06-03 RX ADMIN — LATANOPROST SCH DROP: 50 SOLUTION OPHTHALMIC at 22:07

## 2017-06-03 RX ADMIN — MEMANTINE SCH MG: 5 TABLET ORAL at 08:20

## 2017-06-03 RX ADMIN — ASPIRIN SCH MG: 81 TABLET ORAL at 08:20

## 2017-06-03 RX ADMIN — SODIUM CHLORIDE, PRESERVATIVE FREE SCH ML: 5 INJECTION INTRAVENOUS at 22:08

## 2017-06-03 RX ADMIN — ACETAMINOPHEN PRN MG: 500 TABLET ORAL at 22:07

## 2017-06-03 RX ADMIN — MEMANTINE SCH MG: 5 TABLET ORAL at 22:06

## 2017-06-03 RX ADMIN — INSULIN LISPRO SCH UNITS: 100 INJECTION, SOLUTION INTRAVENOUS; SUBCUTANEOUS at 12:00

## 2017-06-03 RX ADMIN — HYDRALAZINE HYDROCHLORIDE SCH MG: 20 INJECTION INTRAMUSCULAR; INTRAVENOUS at 12:00

## 2017-06-03 RX ADMIN — CEFAZOLIN SODIUM SCH MLS/HR: 1 INJECTION, POWDER, FOR SOLUTION INTRAMUSCULAR; INTRAVENOUS at 08:20

## 2017-06-03 RX ADMIN — SODIUM CHLORIDE, PRESERVATIVE FREE SCH ML: 5 INJECTION INTRAVENOUS at 14:00

## 2017-06-04 VITALS — DIASTOLIC BLOOD PRESSURE: 70 MMHG | SYSTOLIC BLOOD PRESSURE: 160 MMHG

## 2017-06-04 VITALS — SYSTOLIC BLOOD PRESSURE: 147 MMHG | DIASTOLIC BLOOD PRESSURE: 86 MMHG

## 2017-06-04 VITALS — DIASTOLIC BLOOD PRESSURE: 65 MMHG | SYSTOLIC BLOOD PRESSURE: 132 MMHG

## 2017-06-04 LAB
ALBUMIN SERPL BCG-MCNC: 3.2 GM/DL (ref 3.2–5.2)
ALBUMIN/GLOB SERPL: 1.07 {RATIO} (ref 1–1.93)
ALP SERPL-CCNC: 90 U/L (ref 45–117)
ALT SERPL W P-5'-P-CCNC: 22 U/L (ref 12–78)
ANION GAP SERPL CALC-SCNC: 5 MEQ/L (ref 8–16)
AST SERPL-CCNC: 17 U/L (ref 15–37)
BASOPHILS # BLD AUTO: 0 K/MM3 (ref 0–0.2)
BASOPHILS NFR BLD AUTO: 0.4 % (ref 0–1)
BILIRUB SERPL-MCNC: 0.6 MG/DL (ref 0.2–1)
BUN SERPL-MCNC: 13 MG/DL (ref 7–18)
CALCIUM SERPL-MCNC: 8.2 MG/DL (ref 8.8–10.2)
CHLORIDE SERPL-SCNC: 105 MEQ/L (ref 98–107)
CO2 SERPL-SCNC: 30 MEQ/L (ref 21–32)
CREAT SERPL-MCNC: 0.79 MG/DL (ref 0.7–1.3)
EOSINOPHIL # BLD AUTO: 0.3 K/MM3 (ref 0–0.5)
EOSINOPHIL NFR BLD AUTO: 4.5 % (ref 0–3)
ERYTHROCYTE [DISTWIDTH] IN BLOOD BY AUTOMATED COUNT: 13.3 % (ref 11.5–14.5)
GFR SERPL CREATININE-BSD FRML MDRD: > 60 ML/MIN/{1.73_M2} (ref 42–?)
GLUCOSE SERPL-MCNC: 101 MG/DL (ref 83–110)
LARGE UNSTAINED CELL #: 0.1 K/MM3 (ref 0–0.4)
LARGE UNSTAINED CELL %: 1.9 % (ref 0–4)
LYMPHOCYTES # BLD AUTO: 1.8 K/MM3 (ref 1.5–4.5)
LYMPHOCYTES NFR BLD AUTO: 28.5 % (ref 24–44)
MCH RBC QN AUTO: 28.4 PG (ref 27–33)
MCHC RBC AUTO-ENTMCNC: 33.6 G/DL (ref 32–36.5)
MCV RBC AUTO: 84.7 FL (ref 80–96)
MONOCYTES # BLD AUTO: 0.5 K/MM3 (ref 0–0.8)
MONOCYTES NFR BLD AUTO: 7.5 % (ref 0–5)
NEUTROPHILS # BLD AUTO: 3.5 K/MM3 (ref 1.8–7.7)
NEUTROPHILS NFR BLD AUTO: 57.3 % (ref 36–66)
PLATELET # BLD AUTO: 164 K/MM3 (ref 150–450)
POTASSIUM SERPL-SCNC: 3.7 MEQ/L (ref 3.5–5.1)
PROT SERPL-MCNC: 6.2 GM/DL (ref 6.4–8.2)
SODIUM SERPL-SCNC: 140 MEQ/L (ref 136–145)
WBC # BLD AUTO: 6.2 K/MM3 (ref 4–10)

## 2017-06-04 RX ADMIN — SIMVASTATIN SCH MG: 20 TABLET, FILM COATED ORAL at 22:55

## 2017-06-04 RX ADMIN — DOCUSATE SODIUM SCH MG: 100 CAPSULE, LIQUID FILLED ORAL at 22:55

## 2017-06-04 RX ADMIN — DOCUSATE SODIUM SCH MG: 100 CAPSULE, LIQUID FILLED ORAL at 09:35

## 2017-06-04 RX ADMIN — ASPIRIN SCH MG: 81 TABLET ORAL at 09:36

## 2017-06-04 RX ADMIN — SODIUM CHLORIDE, PRESERVATIVE FREE SCH ML: 5 INJECTION INTRAVENOUS at 05:12

## 2017-06-04 RX ADMIN — HYDRALAZINE HYDROCHLORIDE SCH MG: 25 TABLET, FILM COATED ORAL at 15:58

## 2017-06-04 RX ADMIN — LATANOPROST SCH DROP: 50 SOLUTION OPHTHALMIC at 22:54

## 2017-06-04 RX ADMIN — MEMANTINE SCH MG: 5 TABLET ORAL at 22:55

## 2017-06-04 RX ADMIN — MEMANTINE SCH MG: 5 TABLET ORAL at 09:36

## 2017-06-04 RX ADMIN — HYDRALAZINE HYDROCHLORIDE SCH MG: 25 TABLET, FILM COATED ORAL at 09:00

## 2017-06-04 RX ADMIN — HYDRALAZINE HYDROCHLORIDE SCH MG: 25 TABLET, FILM COATED ORAL at 21:00

## 2017-06-04 RX ADMIN — ACETAMINOPHEN PRN MG: 500 TABLET ORAL at 22:54

## 2017-06-04 RX ADMIN — LISINOPRIL SCH MG: 40 TABLET ORAL at 09:36

## 2017-06-04 RX ADMIN — SODIUM CHLORIDE, PRESERVATIVE FREE SCH ML: 5 INJECTION INTRAVENOUS at 22:58

## 2017-06-04 RX ADMIN — SODIUM CHLORIDE, PRESERVATIVE FREE SCH ML: 5 INJECTION INTRAVENOUS at 09:36

## 2017-06-04 RX ADMIN — METFORMIN HYDROCHLORIDE SCH MG: 500 TABLET ORAL at 09:35

## 2017-06-05 VITALS — SYSTOLIC BLOOD PRESSURE: 149 MMHG | DIASTOLIC BLOOD PRESSURE: 74 MMHG

## 2017-06-05 VITALS — SYSTOLIC BLOOD PRESSURE: 133 MMHG | DIASTOLIC BLOOD PRESSURE: 68 MMHG

## 2017-06-05 VITALS — DIASTOLIC BLOOD PRESSURE: 63 MMHG | SYSTOLIC BLOOD PRESSURE: 124 MMHG

## 2017-06-05 LAB
ALBUMIN SERPL BCG-MCNC: 3.2 GM/DL (ref 3.2–5.2)
ALBUMIN/GLOB SERPL: 0.94 {RATIO} (ref 1–1.93)
ALP SERPL-CCNC: 97 U/L (ref 45–117)
ALT SERPL W P-5'-P-CCNC: 25 U/L (ref 12–78)
ANION GAP SERPL CALC-SCNC: 5 MEQ/L (ref 8–16)
AST SERPL-CCNC: 20 U/L (ref 15–37)
BASOPHILS # BLD AUTO: 0 K/MM3 (ref 0–0.2)
BASOPHILS NFR BLD AUTO: 0.7 % (ref 0–1)
BILIRUB SERPL-MCNC: 0.5 MG/DL (ref 0.2–1)
BUN SERPL-MCNC: 13 MG/DL (ref 7–18)
CALCIUM SERPL-MCNC: 8.6 MG/DL (ref 8.8–10.2)
CHLORIDE SERPL-SCNC: 105 MEQ/L (ref 98–107)
CO2 SERPL-SCNC: 30 MEQ/L (ref 21–32)
CREAT SERPL-MCNC: 0.79 MG/DL (ref 0.7–1.3)
EOSINOPHIL # BLD AUTO: 0.3 K/MM3 (ref 0–0.5)
EOSINOPHIL NFR BLD AUTO: 5.3 % (ref 0–3)
ERYTHROCYTE [DISTWIDTH] IN BLOOD BY AUTOMATED COUNT: 13.5 % (ref 11.5–14.5)
GFR SERPL CREATININE-BSD FRML MDRD: > 60 ML/MIN/{1.73_M2} (ref 42–?)
GLUCOSE SERPL-MCNC: 112 MG/DL (ref 83–110)
LARGE UNSTAINED CELL #: 0.1 K/MM3 (ref 0–0.4)
LARGE UNSTAINED CELL %: 2.7 % (ref 0–4)
LYMPHOCYTES # BLD AUTO: 1.7 K/MM3 (ref 1.5–4.5)
LYMPHOCYTES NFR BLD AUTO: 30.5 % (ref 24–44)
MCH RBC QN AUTO: 28.6 PG (ref 27–33)
MCHC RBC AUTO-ENTMCNC: 33.9 G/DL (ref 32–36.5)
MCV RBC AUTO: 84.4 FL (ref 80–96)
MONOCYTES # BLD AUTO: 0.5 K/MM3 (ref 0–0.8)
MONOCYTES NFR BLD AUTO: 9.5 % (ref 0–5)
NEUTROPHILS # BLD AUTO: 2.6 K/MM3 (ref 1.8–7.7)
NEUTROPHILS NFR BLD AUTO: 51.4 % (ref 36–66)
PLATELET # BLD AUTO: 159 K/MM3 (ref 150–450)
POTASSIUM SERPL-SCNC: 3.6 MEQ/L (ref 3.5–5.1)
PROT SERPL-MCNC: 6.6 GM/DL (ref 6.4–8.2)
SODIUM SERPL-SCNC: 140 MEQ/L (ref 136–145)
WBC # BLD AUTO: 5.1 K/MM3 (ref 4–10)

## 2017-06-05 RX ADMIN — SIMVASTATIN SCH MG: 20 TABLET, FILM COATED ORAL at 21:58

## 2017-06-05 RX ADMIN — MEMANTINE SCH MG: 5 TABLET ORAL at 21:58

## 2017-06-05 RX ADMIN — METFORMIN HYDROCHLORIDE SCH MG: 500 TABLET ORAL at 09:54

## 2017-06-05 RX ADMIN — DOCUSATE SODIUM SCH MG: 100 CAPSULE, LIQUID FILLED ORAL at 21:00

## 2017-06-05 RX ADMIN — HYDRALAZINE HYDROCHLORIDE SCH MG: 25 TABLET, FILM COATED ORAL at 09:00

## 2017-06-05 RX ADMIN — ASPIRIN SCH MG: 81 TABLET ORAL at 09:54

## 2017-06-05 RX ADMIN — HYDRALAZINE HYDROCHLORIDE SCH MG: 25 TABLET, FILM COATED ORAL at 16:00

## 2017-06-05 RX ADMIN — SODIUM CHLORIDE, PRESERVATIVE FREE SCH ML: 5 INJECTION INTRAVENOUS at 09:55

## 2017-06-05 RX ADMIN — LATANOPROST SCH DROP: 50 SOLUTION OPHTHALMIC at 21:58

## 2017-06-05 RX ADMIN — SODIUM CHLORIDE, PRESERVATIVE FREE SCH ML: 5 INJECTION INTRAVENOUS at 21:59

## 2017-06-05 RX ADMIN — SODIUM CHLORIDE, PRESERVATIVE FREE SCH ML: 5 INJECTION INTRAVENOUS at 05:00

## 2017-06-05 RX ADMIN — HYDRALAZINE HYDROCHLORIDE SCH MG: 25 TABLET, FILM COATED ORAL at 21:00

## 2017-06-05 RX ADMIN — MEMANTINE SCH MG: 5 TABLET ORAL at 09:54

## 2017-06-05 RX ADMIN — LISINOPRIL SCH MG: 40 TABLET ORAL at 09:54

## 2017-06-05 RX ADMIN — DOCUSATE SODIUM SCH MG: 100 CAPSULE, LIQUID FILLED ORAL at 09:54

## 2017-06-26 LAB
Lab: (no result)

## 2017-06-29 ENCOUNTER — HOSPITAL ENCOUNTER (OUTPATIENT)
Dept: HOSPITAL 53 - M ST | Age: 77
LOS: 1 days | Discharge: HOME | End: 2017-06-30
Attending: NURSE PRACTITIONER
Payer: MEDICARE

## 2017-06-29 DIAGNOSIS — R47.01: ICD-10-CM

## 2017-06-29 DIAGNOSIS — R26.2: ICD-10-CM

## 2017-06-29 DIAGNOSIS — F09: ICD-10-CM

## 2017-06-29 DIAGNOSIS — Z51.89: Primary | ICD-10-CM

## 2017-06-29 DIAGNOSIS — G31.84: ICD-10-CM

## 2017-06-29 PROCEDURE — 92507 TX SP LANG VOICE COMM INDIV: CPT

## 2017-07-21 ENCOUNTER — HOSPITAL ENCOUNTER (OUTPATIENT)
Dept: HOSPITAL 53 - M RAD | Age: 77
End: 2017-07-21
Attending: NEUROLOGICAL SURGERY
Payer: MEDICARE

## 2017-07-21 DIAGNOSIS — G93.0: Primary | ICD-10-CM

## 2017-07-21 NOTE — REP
CT HEAD WITHOUT CONTRAST:

 

HISTORY: Cerebral cyst.

 

COMPARISON: 06/03/2017

 

The patient is status-post right parietal craniotomy. Areas of decreased

attentuation are present in the periventricular white matter. This represents

small vessel ischemic disease. A cyst is present in the right frontal parietal

region. The cyst measures 3 cm in width and is slightly increased in size

compared to the previous study. There is no intraparenchymal hemorrhage, mass, or

midline shift. The ventricular system and cortical sulci are dilated consistent

with mild volume loss. There is no extracerebral collection. Mucosal thickening

is present in the ethmoid and left sphenoid sinuses.

 

IMPRESSION:

 

1. Small vessel ischemic disease.

 

2. Mild volume loss.

 

3. There has been a slight increase in size of the right frontal parietal cyst

compared to the previous study.

 

 

Signed by

Angus Salcido MD 07/31/2017 08:19 A

## 2018-01-23 ENCOUNTER — HOSPITAL ENCOUNTER (INPATIENT)
Dept: HOSPITAL 53 - M MS5PR | Age: 78
LOS: 4 days | Discharge: HOME | DRG: 378 | End: 2018-01-27
Attending: HOSPITALIST | Admitting: GENERAL PRACTICE
Payer: MEDICARE

## 2018-01-23 DIAGNOSIS — I10: ICD-10-CM

## 2018-01-23 DIAGNOSIS — E11.9: ICD-10-CM

## 2018-01-23 DIAGNOSIS — Z79.82: ICD-10-CM

## 2018-01-23 DIAGNOSIS — E87.2: ICD-10-CM

## 2018-01-23 DIAGNOSIS — N17.9: ICD-10-CM

## 2018-01-23 DIAGNOSIS — K92.2: Primary | ICD-10-CM

## 2018-01-23 DIAGNOSIS — K92.0: ICD-10-CM

## 2018-01-23 DIAGNOSIS — Z87.891: ICD-10-CM

## 2018-01-23 DIAGNOSIS — Z79.899: ICD-10-CM

## 2018-01-23 DIAGNOSIS — G62.9: ICD-10-CM

## 2018-01-23 DIAGNOSIS — K21.0: ICD-10-CM

## 2018-01-23 DIAGNOSIS — F02.80: ICD-10-CM

## 2018-01-23 DIAGNOSIS — E86.1: ICD-10-CM

## 2018-01-23 DIAGNOSIS — M43.02: ICD-10-CM

## 2018-01-23 DIAGNOSIS — G30.9: ICD-10-CM

## 2018-01-23 DIAGNOSIS — Z79.84: ICD-10-CM

## 2018-01-23 DIAGNOSIS — M43.06: ICD-10-CM

## 2018-01-23 DIAGNOSIS — E78.00: ICD-10-CM

## 2018-01-23 LAB
ADD MANUAL DIFFER: YES
ALBUMIN/GLOBULIN RATIO: 0.98 (ref 1–1.93)
ALBUMIN: 4.5 GM/DL (ref 3.2–5.2)
ALKALINE PHOSPHATASE: 97 U/L (ref 45–117)
ALT SERPL W P-5'-P-CCNC: 24 U/L (ref 12–78)
ANION GAP: 16 MEQ/L (ref 8–16)
ANION GAP: 16 MEQ/L (ref 8–16)
AST SERPL-CCNC: 19 U/L (ref 7–37)
ATYPICAL LYMPH: 2 % (ref 0–5)
BANDS: 2 % (ref ?–11)
BASO #: 0 10^3/UL (ref 0–0.2)
BASO %: 0.3 % (ref 0–1)
BILIRUB CONJ SERPL-MCNC: 0.2 MG/DL (ref 0–0.2)
BILIRUBIN,TOTAL: 0.8 MG/DL (ref 0.2–1)
BLOOD UREA NITROGEN: 76 MG/DL (ref 7–18)
BLOOD UREA NITROGEN: 86 MG/DL (ref 7–18)
CALCIUM LEVEL: 8.3 MG/DL (ref 8.8–10.2)
CALCIUM LEVEL: 9.6 MG/DL (ref 8.8–10.2)
CARBON DIOXIDE LEVEL: 26 MEQ/L (ref 21–32)
CARBON DIOXIDE LEVEL: 27 MEQ/L (ref 21–32)
CHLORIDE LEVEL: 93 MEQ/L (ref 98–107)
CHLORIDE LEVEL: 99 MEQ/L (ref 98–107)
COMPLEMENT C3: 141 MG/DL (ref 90–180)
COMPLEMENT C4: 38.9 MG/DL (ref 10–40)
CREATININE FOR GFR: 4.57 MG/DL (ref 0.7–1.3)
CREATININE FOR GFR: 4.83 MG/DL (ref 0.7–1.3)
CRP SERPL-MCNC: 14.1 MG/DL (ref 0–0.3)
DIFF SLIDE NUMBER: 351
EOS #: 0 10^3/UL (ref 0–0.5)
EOSINOPHIL NFR BLD AUTO: 0 % (ref 0–3)
ERYTHROCYTE SEDIMENTATION RATE: 3 MM/HR (ref 0–20)
GFR SERPL CREATININE-BSD FRML MDRD: 12.6 ML/MIN/{1.73_M2} (ref 42–?)
GFR SERPL CREATININE-BSD FRML MDRD: 13.4 ML/MIN/{1.73_M2} (ref 42–?)
GLUCOSE, FASTING: 144 MG/DL (ref 70–100)
GLUCOSE, FASTING: 215 MG/DL (ref 70–100)
HEMATOCRIT: 50.3 % (ref 42–52)
HEMATOCRIT: 54.1 % (ref 42–52)
HEMOGLOBIN: 16.7 G/DL (ref 14–18)
HEMOGLOBIN: 18.3 G/DL (ref 14–18)
IMMATURE GRANULOCYTE #: 0 10^3/UL (ref 0–0)
IMMATURE GRANULOCYTE %: 0.2 % (ref 0–0)
INR: 1.04
LACTIC ACID SEPSIS PROTOCOL: 3.5 MMOL/L (ref 0.4–2)
LIPASE: 63 U/L (ref 73–393)
LYMPH #: 1.9 10^3/UL (ref 1.5–4.5)
LYMPH %: 18.3 % (ref 24–44)
LYMPHOCYTES: 15 % (ref 16–52)
MEAN CORPUSCULAR HEMOGLOBIN: 28.1 PG (ref 27–33)
MEAN CORPUSCULAR HEMOGLOBIN: 28.1 PG (ref 27–33)
MEAN CORPUSCULAR HGB CONC: 33.2 G/DL (ref 32–36.5)
MEAN CORPUSCULAR HGB CONC: 33.8 G/DL (ref 32–36.5)
MEAN CORPUSCULAR VOLUME: 83 FL (ref 80–96)
MEAN CORPUSCULAR VOLUME: 84.5 FL (ref 80–96)
METAMYELOCYTES: 1 % (ref 0–0)
MONO #: 1.2 10^3/UL (ref 0–0.8)
MONO %: 11.7 % (ref 0–5)
MONOCYTES: 12 % (ref 0–8)
NEUTROPHILS #: 7.2 10^3/UL (ref 1.8–7.7)
NEUTROPHILS %: 69.5 % (ref 36–66)
NEUTROPHILS: 68 % (ref 35–75)
NRBC BLD AUTO-RTO: 0 % (ref 0–0)
NRBC BLD AUTO-RTO: 0 % (ref 0–0)
PLATELET BLD QL SMEAR: NORMAL
PLATELET COUNT, AUTOMATED: 255 10^3/UL (ref 150–450)
PLATELET COUNT, AUTOMATED: 292 10^3/UL (ref 150–450)
POSITIVE MORPH: (no result)
POTASSIUM SERUM: 4.2 MEQ/L (ref 3.5–5.1)
POTASSIUM SERUM: 4.9 MEQ/L (ref 3.5–5.1)
PROTHROMBIN TIME: 13.7 SECONDS (ref 12.4–14.5)
PSA SERPL-MCNC: 6.66 NG/ML (ref ?–4)
RBC MORPHOLOGY: NORMAL
RED BLOOD COUNT: 5.95 10^6/UL (ref 4.3–6.1)
RED BLOOD COUNT: 6.52 10^6/UL (ref 4.3–6.1)
RED CELL DISTRIBUTION WIDTH: 13.6 % (ref 11.5–14.5)
RED CELL DISTRIBUTION WIDTH: 13.8 % (ref 11.5–14.5)
SODIUM LEVEL: 136 MEQ/L (ref 136–145)
SODIUM LEVEL: 141 MEQ/L (ref 136–145)
SUSPECT SAMPLE: (no result)
TOTAL PROTEIN: 9.1 GM/DL (ref 6.4–8.2)
WHITE BLOOD COUNT: 10.3 10^3/UL (ref 4–10)
WHITE BLOOD COUNT: 10.8 10^3/UL (ref 4–10)

## 2018-01-23 RX ADMIN — DEXTROSE MONOHYDRATE 1 MG: 50 INJECTION, SOLUTION INTRAVENOUS at 18:44

## 2018-01-23 RX ADMIN — SODIUM CHLORIDE 1 MLS/HR: 9 INJECTION, SOLUTION INTRAVENOUS at 18:30

## 2018-01-23 RX ADMIN — SODIUM CHLORIDE 1 MLS/HR: 9 INJECTION, SOLUTION INTRAVENOUS at 23:14

## 2018-01-23 RX ADMIN — SODIUM CHLORIDE 1 MLS/HR: 9 INJECTION, SOLUTION INTRAVENOUS at 19:45

## 2018-01-23 RX ADMIN — ONDANSETRON 1 MG: 2 INJECTION INTRAMUSCULAR; INTRAVENOUS at 18:42

## 2018-01-23 RX ADMIN — SODIUM CHLORIDE 1 MLS/HR: 9 INJECTION, SOLUTION INTRAVENOUS at 23:16

## 2018-01-23 RX ADMIN — DEXTROSE MONOHYDRATE 1 MLS/HR: 50 INJECTION, SOLUTION INTRAVENOUS at 23:15

## 2018-01-24 LAB
AMORPH SED URNS QL MICRO: (no result)
ANION GAP: 15 MEQ/L (ref 8–16)
ANION GAP: 8 MEQ/L (ref 8–16)
APPEARANCE, URINE: (no result)
BACTERIA UR QL AUTO: NEGATIVE
BILIRUBIN, URINE AUTO: NEGATIVE
BLOOD UREA NITROGEN: 87 MG/DL (ref 7–18)
BLOOD UREA NITROGEN: 92 MG/DL (ref 7–18)
BLOOD, URINE BLOOD: (no result)
CALCIUM LEVEL: 7.6 MG/DL (ref 8.8–10.2)
CALCIUM LEVEL: 7.6 MG/DL (ref 8.8–10.2)
CARBON DIOXIDE LEVEL: 24 MEQ/L (ref 21–32)
CARBON DIOXIDE LEVEL: 28 MEQ/L (ref 21–32)
CHLORIDE LEVEL: 102 MEQ/L (ref 98–107)
CHLORIDE LEVEL: 108 MEQ/L (ref 98–107)
CK MB CFR.DF SERPL CALC: 3.13
CK MB CFR.DF SERPL CALC: 3.39
CK SERPL-CCNC: 262 U/L (ref 39–308)
CK SERPL-CCNC: 354 U/L (ref 39–308)
CK-MB VALUE MASS: 11.1 NG/ML (ref 0–3.6)
CK-MB VALUE MASS: 8.9 NG/ML (ref 0–3.6)
CREATININE FOR GFR: 3.59 MG/DL (ref 0.7–1.3)
CREATININE FOR GFR: 4.76 MG/DL (ref 0.7–1.3)
EST. AVERAGE GLUCOSE BLD GHB EST-MCNC: 126 MG/DL (ref 60–110)
GFR SERPL CREATININE-BSD FRML MDRD: 12.8 ML/MIN/{1.73_M2} (ref 42–?)
GFR SERPL CREATININE-BSD FRML MDRD: 17.7 ML/MIN/{1.73_M2} (ref 42–?)
GLUCOSE BLDC GLUCOMTR-MCNC: 100 MG/DL (ref 83–110)
GLUCOSE BLDC GLUCOMTR-MCNC: 95 MG/DL (ref 83–110)
GLUCOSE, FASTING: 111 MG/DL (ref 70–100)
GLUCOSE, FASTING: 131 MG/DL (ref 70–100)
GLUCOSE, URINE (UA) AUTO: NEGATIVE MG/DL
HCV AB SER QL: 0 INDEX (ref ?–0.8)
HEMATOCRIT: 42.7 % (ref 42–52)
HEMATOCRIT: 43.1 % (ref 42–52)
HEMATOCRIT: 43.8 % (ref 42–52)
HEMOGLOBIN: 14.2 G/DL (ref 14–18)
HEMOGLOBIN: 14.3 G/DL (ref 14–18)
HEMOGLOBIN: 14.5 G/DL (ref 14–18)
HEPATITIS A ANTIBODY IGM: NEGATIVE
HEPATITIS B CORE ANTIBODY IGM: NEGATIVE
HEPATITIS B SURFACE ANTIGEN: NEGATIVE
KETONE, URINE AUTO: NEGATIVE MG/DL
LACTIC ACID SEPSIS PROTOCOL: 2 MMOL/L (ref 0.4–2)
LEUKOCYTE ESTERASE UR AUTO RFX: NEGATIVE
LEUKOCYTE ESTERASE UR QL STRIP.AUTO: NEGATIVE
MEAN CORPUSCULAR HEMOGLOBIN: 28.3 PG (ref 27–33)
MEAN CORPUSCULAR HGB CONC: 33.1 G/DL (ref 32–36.5)
MEAN CORPUSCULAR VOLUME: 85.5 FL (ref 80–96)
MUCUS, URINE: (no result)
NITRITE, URINE AUTO: NEGATIVE
NRBC BLD AUTO-RTO: 0 % (ref 0–0)
PH,URINE: 5 UNITS (ref 5–9)
PLATELET COUNT, AUTOMATED: 182 10^3/UL (ref 150–450)
POTASSIUM SERUM: 4.5 MEQ/L (ref 3.5–5.1)
POTASSIUM SERUM: 4.8 MEQ/L (ref 3.5–5.1)
PROT UR QL STRIP.AUTO: NEGATIVE MG/DL
RBC, URINE AUTO: 114 /HPF (ref 0–3)
RED BLOOD COUNT: 5.12 10^6/UL (ref 4.3–6.1)
RED CELL DISTRIBUTION WIDTH: 13.7 % (ref 11.5–14.5)
SODIUM LEVEL: 141 MEQ/L (ref 136–145)
SODIUM LEVEL: 144 MEQ/L (ref 136–145)
SPECIFIC GRAVITY UR AUTO RFX: 1.02 (ref 1–1.03)
SPECIFIC GRAVITY URINE AUTO: 1.01 (ref 1–1.03)
SQUAM EPITHELIAL CELL UR AURFX: 0 /HPF (ref 0–6)
SQUAMOUS #/AREA URNS AUTO: 0 /HPF (ref 0–6)
TROPONIN I: 0.18 NG/ML (ref ?–0.1)
TROPONIN I: 0.3 NG/ML (ref ?–0.1)
URINE TOTAL PROTEIN: 103.3 MG/DL (ref 0–12)
UROBILINOGEN, URINE AUTO: 0.2 MG/DL (ref 0–2)
WBC, URINE AUTO: 4 /HPF (ref 0–3)
WHITE BLOOD COUNT: 8.8 10^3/UL (ref 4–10)

## 2018-01-24 PROCEDURE — 0DB58ZX EXCISION OF ESOPHAGUS, VIA NATURAL OR ARTIFICIAL OPENING ENDOSCOPIC, DIAGNOSTIC: ICD-10-PCS

## 2018-01-24 RX ADMIN — DEXTROSE MONOHYDRATE 1 MLS/HR: 50 INJECTION, SOLUTION INTRAVENOUS at 17:35

## 2018-01-24 RX ADMIN — DEXTROSE MONOHYDRATE 1 MLS/HR: 50 INJECTION, SOLUTION INTRAVENOUS at 02:32

## 2018-01-24 RX ADMIN — DEXTROSE MONOHYDRATE 1 MLS/HR: 50 INJECTION, SOLUTION INTRAVENOUS at 21:15

## 2018-01-24 RX ADMIN — DEXTROSE MONOHYDRATE 1 MLS/HR: 50 INJECTION, SOLUTION INTRAVENOUS at 06:23

## 2018-01-24 RX ADMIN — DEXTROSE MONOHYDRATE 1 MLS/HR: 50 INJECTION, SOLUTION INTRAVENOUS at 11:59

## 2018-01-24 RX ADMIN — PIPERACILLIN SODIUM AND TAZOBACTAM SODIUM 1 MLS/HR: 36; 4.5 INJECTION, POWDER, FOR SOLUTION INTRAVENOUS at 22:40

## 2018-01-24 RX ADMIN — SUCRALFATE 1 GM: 1 SUSPENSION ORAL at 00:04

## 2018-01-24 RX ADMIN — SODIUM CHLORIDE 1 MLS/HR: 9 INJECTION, SOLUTION INTRAVENOUS at 02:45

## 2018-01-24 RX ADMIN — SODIUM CHLORIDE 1 MLS/HR: 9 INJECTION, SOLUTION INTRAVENOUS at 12:26

## 2018-01-24 RX ADMIN — SODIUM CHLORIDE 1 MLS/HR: 9 INJECTION, SOLUTION INTRAVENOUS at 04:41

## 2018-01-24 RX ADMIN — SUCRALFATE 1 GM: 1 SUSPENSION ORAL at 06:22

## 2018-01-24 RX ADMIN — PIPERACILLIN SODIUM AND TAZOBACTAM SODIUM 1 MLS/HR: 36; 4.5 INJECTION, POWDER, FOR SOLUTION INTRAVENOUS at 15:45

## 2018-01-24 RX ADMIN — SUCRALFATE 1 GM: 1 SUSPENSION ORAL at 11:59

## 2018-01-24 RX ADMIN — SUCRALFATE 1 GM: 1 SUSPENSION ORAL at 17:21

## 2018-01-24 RX ADMIN — SODIUM CHLORIDE 1 MLS/HR: 9 INJECTION, SOLUTION INTRAVENOUS at 19:31

## 2018-01-24 RX ADMIN — PIPERACILLIN SODIUM AND TAZOBACTAM SODIUM 1 MLS/HR: 36; 4.5 INJECTION, POWDER, FOR SOLUTION INTRAVENOUS at 00:01

## 2018-01-24 RX ADMIN — PIPERACILLIN SODIUM AND TAZOBACTAM SODIUM 1 MLS/HR: 36; 4.5 INJECTION, POWDER, FOR SOLUTION INTRAVENOUS at 06:23

## 2018-01-25 LAB
ALBUMIN %: 57.5 % (ref 55.8–66.1)
ALBUMIN: 5.23 GM/DL (ref 3.29–5.55)
ALPHA-1-GLOBULIN %: 5.8 % (ref 2.9–4.9)
ALPHA-1-GLOBULINS: 0.53 GM/DL (ref 0.17–0.41)
ALPHA-2-GLOBULINS %: 10.8 % (ref 7.1–11.8)
ALPHA-2-GLOBULINS: 0.98 GM/DL (ref 0.42–0.99)
ANION GAP: 6 MEQ/L (ref 8–16)
B-GLOBULIN SERPL ELPH-MCNC: 0.52 GM/DL (ref 0.28–0.6)
BETA1 GLOB MFR SERPL ELPH: 5.7 % (ref 4.7–7.2)
BETA2 GLOB MFR SERPL ELPH: 6.3 % (ref 3.2–6.5)
BETA2 GLOB SERPL ELPH-MCNC: 0.57 GM/DL (ref 0.19–0.55)
BLOOD UREA NITROGEN: 63 MG/DL (ref 7–18)
CALCIUM LEVEL: 7.5 MG/DL (ref 8.8–10.2)
CARBON DIOXIDE LEVEL: 25 MEQ/L (ref 21–32)
CHLORIDE LEVEL: 114 MEQ/L (ref 98–107)
CREATININE FOR GFR: 1.38 MG/DL (ref 0.7–1.3)
GAMMA GLOBULIN %: 13.9 % (ref 11.1–18.8)
GAMMA GLOBULINS: 1.26 GM/DL (ref 0.65–1.58)
GFR SERPL CREATININE-BSD FRML MDRD: 53.3 ML/MIN/{1.73_M2} (ref 42–?)
GLUCOSE BLDC GLUCOMTR-MCNC: 103 MG/DL (ref 83–110)
GLUCOSE BLDC GLUCOMTR-MCNC: 128 MG/DL (ref 83–110)
GLUCOSE, FASTING: 103 MG/DL (ref 70–100)
HEMATOCRIT: 39.3 % (ref 42–52)
HEMATOCRIT: 39.8 % (ref 42–52)
HEMATOCRIT: 40.1 % (ref 42–52)
HEMATOCRIT: 40.2 % (ref 42–52)
HEMOGLOBIN: 12.8 G/DL (ref 14–18)
HEMOGLOBIN: 12.9 G/DL (ref 14–18)
HEMOGLOBIN: 13.1 G/DL (ref 14–18)
HEMOGLOBIN: 13.1 G/DL (ref 14–18)
MEAN CORPUSCULAR HEMOGLOBIN: 28.1 PG (ref 27–33)
MEAN CORPUSCULAR HGB CONC: 32.6 G/DL (ref 32–36.5)
MEAN CORPUSCULAR VOLUME: 86.4 FL (ref 80–96)
NRBC BLD AUTO-RTO: 0 % (ref 0–0)
PLATELET COUNT, AUTOMATED: 174 10^3/UL (ref 150–450)
POTASSIUM SERUM: 3.8 MEQ/L (ref 3.5–5.1)
RED BLOOD COUNT: 4.55 10^6/UL (ref 4.3–6.1)
RED CELL DISTRIBUTION WIDTH: 13.7 % (ref 11.5–14.5)
SODIUM LEVEL: 145 MEQ/L (ref 136–145)
SPECIMEN VOL 24H UR: (no result) ML
SPEP INTERPRETATION: (no result)
TOTAL PROTEIN: 9.1 GM/DL (ref 6.4–8.2)
UPEP INTERPRETATION: (no result)
WHITE BLOOD COUNT: 6.4 10^3/UL (ref 4–10)

## 2018-01-25 RX ADMIN — SUCRALFATE 1 GM: 1 SUSPENSION ORAL at 05:54

## 2018-01-25 RX ADMIN — SUCRALFATE 1 GM: 1 SUSPENSION ORAL at 00:14

## 2018-01-25 RX ADMIN — SUCRALFATE 1 GM: 1 SUSPENSION ORAL at 11:58

## 2018-01-25 RX ADMIN — SODIUM CHLORIDE 1 MLS/HR: 9 INJECTION, SOLUTION INTRAVENOUS at 03:54

## 2018-01-25 RX ADMIN — PANTOPRAZOLE SODIUM 1 MG: 40 TABLET, DELAYED RELEASE ORAL at 20:32

## 2018-01-25 RX ADMIN — PIPERACILLIN SODIUM AND TAZOBACTAM SODIUM 1 MLS/HR: 36; 4.5 INJECTION, POWDER, FOR SOLUTION INTRAVENOUS at 05:54

## 2018-01-25 RX ADMIN — DEXTROSE MONOHYDRATE 1 MLS/HR: 50 INJECTION, SOLUTION INTRAVENOUS at 03:28

## 2018-01-25 RX ADMIN — SUCRALFATE 1 GM: 1 SUSPENSION ORAL at 17:59

## 2018-01-25 RX ADMIN — PANTOPRAZOLE SODIUM 1 MG: 40 TABLET, DELAYED RELEASE ORAL at 10:16

## 2018-01-25 RX ADMIN — DEXTROSE MONOHYDRATE 1 MLS/HR: 50 INJECTION, SOLUTION INTRAVENOUS at 03:54

## 2018-01-25 RX ADMIN — PIPERACILLIN SODIUM AND TAZOBACTAM SODIUM 1 MLS/HR: 36; 4.5 INJECTION, POWDER, FOR SOLUTION INTRAVENOUS at 15:29

## 2018-01-25 RX ADMIN — PIPERACILLIN SODIUM AND TAZOBACTAM SODIUM 1 MLS/HR: 36; 4.5 INJECTION, POWDER, FOR SOLUTION INTRAVENOUS at 23:22

## 2018-01-25 RX ADMIN — SUCRALFATE 1 GM: 1 SUSPENSION ORAL at 23:22

## 2018-01-26 LAB
ANION GAP: 4 MEQ/L (ref 8–16)
BLOOD UREA NITROGEN: 35 MG/DL (ref 7–18)
CALCIUM LEVEL: 8.4 MG/DL (ref 8.8–10.2)
CARBON DIOXIDE LEVEL: 32 MEQ/L (ref 21–32)
CH50 SERPL-ACNC: > 63 U/ML (ref 42–60)
CHLORIDE LEVEL: 107 MEQ/L (ref 98–107)
CREATININE FOR GFR: 0.93 MG/DL (ref 0.7–1.3)
GFR SERPL CREATININE-BSD FRML MDRD: > 60 ML/MIN/{1.73_M2} (ref 42–?)
GLUCOSE, FASTING: 107 MG/DL (ref 70–100)
HEMATOCRIT: 37.6 % (ref 42–52)
HEMATOCRIT: 38.4 % (ref 42–52)
HEMOGLOBIN: 12.3 G/DL (ref 14–18)
HEMOGLOBIN: 12.3 G/DL (ref 14–18)
MEAN CORPUSCULAR HEMOGLOBIN: 28 PG (ref 27–33)
MEAN CORPUSCULAR HGB CONC: 32 G/DL (ref 32–36.5)
MEAN CORPUSCULAR VOLUME: 87.5 FL (ref 80–96)
NRBC BLD AUTO-RTO: 0 % (ref 0–0)
PLATELET COUNT, AUTOMATED: 159 10^3/UL (ref 150–450)
POTASSIUM SERUM: 4 MEQ/L (ref 3.5–5.1)
RED BLOOD COUNT: 4.39 10^6/UL (ref 4.3–6.1)
RED CELL DISTRIBUTION WIDTH: 13.3 % (ref 11.5–14.5)
SODIUM LEVEL: 143 MEQ/L (ref 136–145)
WHITE BLOOD COUNT: 6.5 10^3/UL (ref 4–10)

## 2018-01-26 RX ADMIN — PANTOPRAZOLE SODIUM 1 MG: 40 TABLET, DELAYED RELEASE ORAL at 08:53

## 2018-01-26 RX ADMIN — SUCRALFATE 1 GM: 1 SUSPENSION ORAL at 12:00

## 2018-01-26 RX ADMIN — SUCRALFATE 1 GM: 1 SUSPENSION ORAL at 23:01

## 2018-01-26 RX ADMIN — SUCRALFATE 1 GM: 1 SUSPENSION ORAL at 17:25

## 2018-01-26 RX ADMIN — PIPERACILLIN SODIUM AND TAZOBACTAM SODIUM 1 MLS/HR: 36; 4.5 INJECTION, POWDER, FOR SOLUTION INTRAVENOUS at 06:10

## 2018-01-26 RX ADMIN — SUCRALFATE 1 GM: 1 SUSPENSION ORAL at 06:10

## 2018-01-26 RX ADMIN — PANTOPRAZOLE SODIUM 1 MG: 40 TABLET, DELAYED RELEASE ORAL at 23:01

## 2018-01-27 LAB
ANION GAP: 8 MEQ/L (ref 8–16)
BLOOD UREA NITROGEN: 20 MG/DL (ref 7–18)
CALCIUM LEVEL: 8.2 MG/DL (ref 8.8–10.2)
CARBON DIOXIDE LEVEL: 28 MEQ/L (ref 21–32)
CHLORIDE LEVEL: 105 MEQ/L (ref 98–107)
CREATININE FOR GFR: 0.66 MG/DL (ref 0.7–1.3)
GFR SERPL CREATININE-BSD FRML MDRD: > 60 ML/MIN/{1.73_M2} (ref 42–?)
GLUCOSE, FASTING: 103 MG/DL (ref 70–100)
POTASSIUM SERUM: 3.8 MEQ/L (ref 3.5–5.1)
SODIUM LEVEL: 141 MEQ/L (ref 136–145)

## 2018-01-27 RX ADMIN — SUCRALFATE 1 GM: 1 SUSPENSION ORAL at 06:00

## 2018-01-27 RX ADMIN — PANTOPRAZOLE SODIUM 1 MG: 40 TABLET, DELAYED RELEASE ORAL at 08:40

## 2018-01-28 LAB — GLUCOSE BLDC GLUCOMTR-MCNC: 105 MG/DL (ref 83–110)

## 2018-02-13 ENCOUNTER — HOSPITAL ENCOUNTER (OUTPATIENT)
Dept: HOSPITAL 53 - M LABNEURO | Age: 78
End: 2018-02-13
Attending: PSYCHIATRY & NEUROLOGY
Payer: MEDICARE

## 2018-02-13 DIAGNOSIS — R26.89: ICD-10-CM

## 2018-02-13 DIAGNOSIS — G62.9: Primary | ICD-10-CM

## 2018-02-13 DIAGNOSIS — R47.1: ICD-10-CM

## 2018-02-13 LAB
ERYTHROCYTE SEDIMENTATION RATE: 7 MM/HR (ref 0–20)
EST. AVERAGE GLUCOSE BLD GHB EST-MCNC: 126 MG/DL (ref 60–110)
FOLATE SERPL-MCNC: > 24 NG/ML
FREE T4: 1.14 NG/DL (ref 0.76–1.46)
RHEUMATOID FACTOR QUANT: < 10 IU/ML (ref 0–15)
THYROID STIMULATING HORMONE: 0.34 UIU/ML (ref 0.36–3.74)
TOTAL PROTEIN: 7.6 GM/DL (ref 6.4–8.2)
VIT B12 SERPL-MCNC: 689 PG/ML

## 2018-02-13 PROCEDURE — 82746 ASSAY OF FOLIC ACID SERUM: CPT

## 2018-02-14 LAB
ALBUMIN %: 58.5 % (ref 55.8–66.1)
ALBUMIN: 4.45 GM/DL (ref 3.29–5.55)
ALPHA-1-GLOBULIN %: 4.7 % (ref 2.9–4.9)
ALPHA-1-GLOBULINS: 0.36 GM/DL (ref 0.17–0.41)
ALPHA-2-GLOBULINS %: 10.8 % (ref 7.1–11.8)
ALPHA-2-GLOBULINS: 0.82 GM/DL (ref 0.42–0.99)
B-GLOBULIN SERPL ELPH-MCNC: 0.46 GM/DL (ref 0.28–0.6)
BETA1 GLOB MFR SERPL ELPH: 6.1 % (ref 4.7–7.2)
BETA2 GLOB MFR SERPL ELPH: 5.6 % (ref 3.2–6.5)
BETA2 GLOB SERPL ELPH-MCNC: 0.43 GM/DL (ref 0.19–0.55)
GAMMA GLOBULIN %: 14.3 % (ref 11.1–18.8)
GAMMA GLOBULINS: 1.09 GM/DL (ref 0.65–1.58)
SPEP INTERPRETATION: (no result)
SYPHILIS: NONREACTIVE

## 2018-02-20 LAB
A-TOCOPHEROL VIT E SERPL-MCNC: 20.5 MG/L (ref 5.3–17.5)
CARDIOLIPIN IGA ANTIBODY: <9 APL U/ML (ref 0–11)
CARDIOLIPIN IGG SER IA-ACNC: <9 GPL U/ML (ref 0–14)
CARDIOLIPIN IGM SER IA-ACNC: <9 MPL U/ML (ref 0–12)
VITAMIN B1 LEVEL WHOLE BLOOD: 150.3 NMOL/L (ref 66.5–200)
VITAMIN B6,PYRIDOXAL PHOSPHATE: 19.6 UG/L (ref 5.3–46.7)

## 2018-03-13 ENCOUNTER — HOSPITAL ENCOUNTER (EMERGENCY)
Dept: HOSPITAL 53 - M ED | Age: 78
LOS: 1 days | Discharge: HOME | End: 2018-03-14
Payer: MEDICARE

## 2018-03-13 DIAGNOSIS — Z98.890: ICD-10-CM

## 2018-03-13 DIAGNOSIS — F03.90: ICD-10-CM

## 2018-03-13 DIAGNOSIS — K21.9: ICD-10-CM

## 2018-03-13 DIAGNOSIS — I10: ICD-10-CM

## 2018-03-13 DIAGNOSIS — E86.9: Primary | ICD-10-CM

## 2018-03-13 DIAGNOSIS — Z86.69: ICD-10-CM

## 2018-03-13 DIAGNOSIS — E11.9: ICD-10-CM

## 2018-03-13 DIAGNOSIS — Z87.19: ICD-10-CM

## 2018-03-13 PROCEDURE — 93005 ELECTROCARDIOGRAM TRACING: CPT

## 2018-03-14 LAB
ALBUMIN/GLOBULIN RATIO: 1.1 (ref 1–1.93)
ALBUMIN: 4.3 GM/DL (ref 3.2–5.2)
ALKALINE PHOSPHATASE: 118 U/L (ref 45–117)
ALT SERPL W P-5'-P-CCNC: 23 U/L (ref 12–78)
ANION GAP: 9 MEQ/L (ref 8–16)
AST SERPL-CCNC: 17 U/L (ref 7–37)
BASO #: 0.1 10^3/UL (ref 0–0.2)
BASO %: 0.4 % (ref 0–1)
BILIRUB CONJ SERPL-MCNC: 0.2 MG/DL (ref 0–0.2)
BILIRUBIN,TOTAL: 0.8 MG/DL (ref 0.2–1)
BLOOD UREA NITROGEN: 36 MG/DL (ref 7–18)
CALCIUM LEVEL: 9.6 MG/DL (ref 8.8–10.2)
CARBON DIOXIDE LEVEL: 28 MEQ/L (ref 21–32)
CHLORIDE LEVEL: 100 MEQ/L (ref 98–107)
CREATININE FOR GFR: 1.59 MG/DL (ref 0.7–1.3)
EOS #: 0 10^3/UL (ref 0–0.5)
EOSINOPHIL NFR BLD AUTO: 0.3 % (ref 0–3)
ETHYL ALCOHOL (ETHANOL): < 0.003 % (ref 0–0.01)
GFR SERPL CREATININE-BSD FRML MDRD: 45.3 ML/MIN/{1.73_M2} (ref 42–?)
GLUCOSE, FASTING: 153 MG/DL (ref 70–100)
HEMATOCRIT: 50.1 % (ref 42–52)
HEMOGLOBIN: 16.6 G/DL (ref 14–18)
IMMATURE GRANULOCYTE %: 0.3 % (ref 0–3)
LACTIC ACID SEPSIS PROTOCOL: 2 MMOL/L (ref 0.4–2)
LIPASE: 124 U/L (ref 73–393)
LYMPH #: 1.1 10^3/UL (ref 1.5–4.5)
LYMPH %: 7.8 % (ref 24–44)
MEAN CORPUSCULAR HEMOGLOBIN: 28 PG (ref 27–33)
MEAN CORPUSCULAR HGB CONC: 33.1 G/DL (ref 32–36.5)
MEAN CORPUSCULAR VOLUME: 84.5 FL (ref 80–96)
MONO #: 0.7 10^3/UL (ref 0–0.8)
MONO %: 4.9 % (ref 0–5)
NEUTROPHILS #: 11.7 10^3/UL (ref 1.8–7.7)
NEUTROPHILS %: 86.3 % (ref 36–66)
NRBC BLD AUTO-RTO: 0 % (ref 0–0)
PLATELET COUNT, AUTOMATED: 263 10^3/UL (ref 150–450)
POTASSIUM SERUM: 4.3 MEQ/L (ref 3.5–5.1)
RED BLOOD COUNT: 5.93 10^6/UL (ref 4.3–6.1)
RED CELL DISTRIBUTION WIDTH: 13.2 % (ref 11.5–14.5)
SODIUM LEVEL: 137 MEQ/L (ref 136–145)
TOTAL PROTEIN: 8.2 GM/DL (ref 6.4–8.2)
WHITE BLOOD COUNT: 13.6 10^3/UL (ref 4–10)

## 2018-03-14 RX ADMIN — SODIUM CHLORIDE 1 MLS/HR: 9 INJECTION, SOLUTION INTRAVENOUS at 01:29

## 2018-03-14 RX ADMIN — SODIUM CHLORIDE 1 MLS/HR: 9 INJECTION, SOLUTION INTRAVENOUS at 00:09

## 2018-03-19 ENCOUNTER — HOSPITAL ENCOUNTER (OUTPATIENT)
Dept: HOSPITAL 53 - M RAD | Age: 78
End: 2018-03-19
Attending: NEUROLOGICAL SURGERY
Payer: MEDICARE

## 2018-03-19 DIAGNOSIS — R94.39: Primary | ICD-10-CM

## 2018-03-19 PROCEDURE — 75809 NONVASCULAR SHUNT X-RAY: CPT

## 2018-03-20 ENCOUNTER — HOSPITAL ENCOUNTER (OUTPATIENT)
Dept: HOSPITAL 53 - M RAD | Age: 78
End: 2018-03-20
Attending: NEUROLOGICAL SURGERY
Payer: MEDICARE

## 2018-03-20 DIAGNOSIS — Z01.818: Primary | ICD-10-CM

## 2018-03-20 PROCEDURE — 75809 NONVASCULAR SHUNT X-RAY: CPT

## 2018-11-09 ENCOUNTER — HOSPITAL ENCOUNTER (OUTPATIENT)
Dept: HOSPITAL 53 - M LAB REF | Age: 78
End: 2018-11-09
Attending: FAMILY MEDICINE
Payer: MEDICARE

## 2018-11-09 DIAGNOSIS — R79.89: Primary | ICD-10-CM

## 2018-11-09 LAB — FREE T3: 2.7 PG/ML (ref 2.2–4)

## 2018-11-09 PROCEDURE — 84481 FREE ASSAY (FT-3): CPT

## 2019-07-26 ENCOUNTER — HOSPITAL ENCOUNTER (INPATIENT)
Dept: HOSPITAL 53 - M ED | Age: 79
LOS: 6 days | Discharge: HOME HEALTH | DRG: 392 | End: 2019-08-01
Admitting: INTERNAL MEDICINE
Payer: MEDICARE

## 2019-07-26 VITALS — DIASTOLIC BLOOD PRESSURE: 61 MMHG | SYSTOLIC BLOOD PRESSURE: 109 MMHG

## 2019-07-26 VITALS — SYSTOLIC BLOOD PRESSURE: 89 MMHG | DIASTOLIC BLOOD PRESSURE: 54 MMHG

## 2019-07-26 VITALS — WEIGHT: 179.68 LBS | BODY MASS INDEX: 27.23 KG/M2 | HEIGHT: 68 IN

## 2019-07-26 VITALS — DIASTOLIC BLOOD PRESSURE: 56 MMHG | SYSTOLIC BLOOD PRESSURE: 96 MMHG

## 2019-07-26 DIAGNOSIS — Z79.899: ICD-10-CM

## 2019-07-26 DIAGNOSIS — E78.5: ICD-10-CM

## 2019-07-26 DIAGNOSIS — F02.80: ICD-10-CM

## 2019-07-26 DIAGNOSIS — K52.9: Primary | ICD-10-CM

## 2019-07-26 DIAGNOSIS — N17.9: ICD-10-CM

## 2019-07-26 DIAGNOSIS — Z98.2: ICD-10-CM

## 2019-07-26 DIAGNOSIS — E87.2: ICD-10-CM

## 2019-07-26 DIAGNOSIS — G30.9: ICD-10-CM

## 2019-07-26 DIAGNOSIS — Z79.82: ICD-10-CM

## 2019-07-26 DIAGNOSIS — E86.0: ICD-10-CM

## 2019-07-26 DIAGNOSIS — R91.8: ICD-10-CM

## 2019-07-26 DIAGNOSIS — I12.9: ICD-10-CM

## 2019-07-26 DIAGNOSIS — E11.22: ICD-10-CM

## 2019-07-26 DIAGNOSIS — K56.7: ICD-10-CM

## 2019-07-26 DIAGNOSIS — Z87.891: ICD-10-CM

## 2019-07-26 DIAGNOSIS — N18.3: ICD-10-CM

## 2019-07-26 LAB
ALBUMIN SERPL BCG-MCNC: 4.8 GM/DL (ref 3.2–5.2)
ALT SERPL W P-5'-P-CCNC: 29 U/L (ref 12–78)
APAP SERPL-MCNC: < 2 UG/ML (ref 10–30)
BASOPHILS NFR BLD MANUAL: 1 % (ref 0–4)
BILIRUB CONJ SERPL-MCNC: 0.3 MG/DL (ref 0–0.2)
BILIRUB SERPL-MCNC: 1.1 MG/DL (ref 0.2–1)
BUN SERPL-MCNC: 33 MG/DL (ref 7–18)
CALCIUM SERPL-MCNC: 10.6 MG/DL (ref 8.8–10.2)
CHLORIDE SERPL-SCNC: 98 MEQ/L (ref 98–107)
CK MB CFR.DF SERPL CALC: 2.39
CK MB SERPL-MCNC: 4.2 NG/ML (ref ?–3.6)
CK SERPL-CCNC: 176 U/L (ref 39–308)
CO2 SERPL-SCNC: 29 MEQ/L (ref 21–32)
CREAT SERPL-MCNC: 2.12 MG/DL (ref 0.7–1.3)
ETHANOL SERPL-MCNC: < 0.003 % (ref 0–0.01)
GFR SERPL CREATININE-BSD FRML MDRD: 32.3 ML/MIN/{1.73_M2} (ref 42–?)
GLUCOSE SERPL-MCNC: 199 MG/DL (ref 70–100)
HCT VFR BLD AUTO: 58.2 % (ref 42–52)
HGB BLD-MCNC: 18.8 G/DL (ref 13.5–17.5)
LYMPHOCYTES NFR BLD MANUAL: 14 % (ref 16–52)
MCH RBC QN AUTO: 28.3 PG (ref 27–33)
MCHC RBC AUTO-ENTMCNC: 32.3 G/DL (ref 32–36.5)
MCV RBC AUTO: 87.7 FL (ref 80–96)
MONOCYTES NFR BLD MANUAL: 3 % (ref 0–8)
NEUTROPHILS NFR BLD MANUAL: 82 % (ref 35–75)
PLATELET # BLD AUTO: 237 10^3/UL (ref 150–450)
PLATELET BLD QL SMEAR: NORMAL
POTASSIUM SERPL-SCNC: 4.3 MEQ/L (ref 3.5–5.1)
PROT SERPL-MCNC: 9.1 GM/DL (ref 6.4–8.2)
RBC # BLD AUTO: 6.64 10^6/UL (ref 4.3–6.1)
RBC MORPH BLD: NORMAL
SALICYLATES SERPL-MCNC: < 1.7 MG/DL (ref 5–30)
SODIUM SERPL-SCNC: 140 MEQ/L (ref 136–145)
TROPONIN I SERPL-MCNC: < 0.02 NG/ML (ref ?–0.1)
TSH SERPL DL<=0.005 MIU/L-ACNC: 0.27 UIU/ML (ref 0.36–3.74)
WBC # BLD AUTO: 9.2 10^3/UL (ref 4–10)

## 2019-07-26 RX ADMIN — SODIUM CHLORIDE SCH MLS/HR: 9 INJECTION, SOLUTION INTRAVENOUS at 20:47

## 2019-07-26 RX ADMIN — SUCRALFATE SCH GM: 1 TABLET ORAL at 17:30

## 2019-07-26 RX ADMIN — SUCRALFATE SCH GM: 1 TABLET ORAL at 20:47

## 2019-07-26 RX ADMIN — DEXTROSE MONOHYDRATE SCH MG: 50 INJECTION, SOLUTION INTRAVENOUS at 20:47

## 2019-07-26 RX ADMIN — SODIUM CHLORIDE SCH MLS/HR: 9 INJECTION, SOLUTION INTRAVENOUS at 23:15

## 2019-07-26 NOTE — REPVR
EXAM: 

CT Chest Without Contrast 



EXAM DATE/TIME: 

7/26/2019 5:29 PM 



CLINICAL HISTORY: 

79 years old, male; Abnormal findings; Lung mass or nodule; Multiple nodules; 

Additional info: ? Pulmonary nodules 



TECHNIQUE: 

Imaging protocol: Axial computed tomography images of the chest without 

intravenous contrast. Coronal and sagittal reformatted images were created and 

reviewed. 

3D rendering: MIP reconstructed images were created and reviewed. 

Radiation optimization: All CT scans at this facility use at least one of these 

dose optimization techniques: automated exposure control; mA and/or kV 

adjustment per patient size (includes targeted exams where dose is matched to 

clinical indication); or iterative reconstruction. 



COMPARISON: 

CR Chest, 1 view 1/23/2018 6:58 PM 



FINDINGS: 

Lungs: Bilateral pulmonary parenchymal nodules measuring up to 11.6 cm in the 

right middle lobe. Although possibly postinfectious or inflammatory metastatic 

lesions not excluded. There is bibasilar compressive atelectasis. 

Pleural space: Unremarkable. No pneumothorax. No pleural effusion. 

Heart: There is mild atherosclerotic calcification of the coronary arteries. 

Mediastinum: A small hiatal hernia is present. 

Aorta: The aorta demonstrates mild atherosclerotic calcification. There is 

fusiform dilatation of the ascending thoracic aorta which measures 4.6 cm. 

maximally. There is no dissection or saccular component. 

Lymph nodes: Unremarkable. No enlarged lymph nodes. 

Bones/joints: The spine demonstrates mild degenerative changes. Diffuse 

decrease in bone mineralization consistent with osteoporosis or osteopenia. 

Soft tissues: Unremarkable. 

Kidneys and ureters: Peripherally calcified cystic lesion right kidney measures 

1.6 cm. Lesion not fully characterized on this examination dedicated to the 

thorax without intravenous contrast. 



IMPRESSION: 

1. Bilateral pulmonary parenchymal nodules measuring up to 11.6 cm in the right 

middle lobe. Although possibly postinfectious or inflammatory metastatic 

lesions not excluded. For patients at low risk (minimal or absent history of 

smoking and of other known risk factors), recommend CT at 3-6 months, then 

consider CT at 18-24 months. For patients at high risk (history of smoking or 

of other known risk factors), recommend CT at 3-6 months, then CT at 18-24 

months. (George et al., Fleischner Society, 2017).

2. There is fusiform dilatation of the ascending thoracic aorta which measures 

4.6 cm. maximally. There is no dissection or saccular component. 

3. Small hiatal hernia. 



Electronically signed by: Nirav Anne On 07/26/2019  18:15:35 PM

## 2019-07-26 NOTE — HPEPDOC
General


Date of Admission


Jul 26, 2019 at 15:53


Date of Service:  Jul 26, 2019


Chief Complaint


The patient is a 79-year-old male admitted with a reason for visit of Volume 

Depletion.





History of Present Illness


79-year-old male with past medical history of hypertension, dyslipidemia, 

diabetes, subarachnoid cyst, Alzheimer's dementia, and chronic kidney disease 

presented to the ER with a chief complaint of nausea with vomiting. The 

patient's history is limited secondary to his underlying dementia. However, 

according to the patient's son who is at the bedside, he went to dinner with his

dad yesterday evening when he was last well. This morning, the patient 

apparently woke up and had significant nausea and had about 10 episodes of 

brown-colored emesis. There was no fevers, chills, abdominal pain, or diarrhea 

noted. The patient's son states that he did not have any illness, but ate 

different food in his father who had roast beef.





In the ER, the patient was noted to be dehydrated and had a borderline low blood

pressure. The patient's lactic acid level was also noted to be elevated at 4.9. 

He will be admitted to the hospitalist service for further evaluation and 

management.





Home Medications


Scheduled


Aspirin (Aspirin EC) 81 Mg Tab, 81 MG PO DAILY, (Reported)


Docusate Sodium (Colace) 100 Mg Cap, 100 MG PO BID, (Reported)


Donepezil HCl (Donepezil HCl) 5 Mg Tablet, 5 MG PO QHS, (Reported)


Lisinopril (Lisinopril) 40 Mg Tab, 20 MG PO DAILY, (Reported)


Melatonin (Melatonin) 3 Mg Tablet, 3 MG PO QHS, (Reported)


Memantine HCl (Namenda Xr) 28 Mg Cap, 28 MG PO DAILY, (Reported)


Metformin HCl (Metformin HCl) 500 Mg Tab, 500 MG PO DAILY, (Reported)


Multivitamins (Thera M Plus Tablet) 1 Tab Tab, 1 TAB PO DAILY, (Reported)


Olopatadine HCl (Olopatadine HCl) 0.2% 2.5ML Drops, 1 DROP OU BID, (Reported)


Pantoprazole Sodium (Pantoprazole Sodium) 40 Mg Tab, 40 MG PO BID


Simvastatin (Simvastatin) 20 Mg Tablet, 20 MG PO QHS, (Reported)





Scheduled PRN


Polyvinyl Alcohol (Artificial Tears) 15 Ml Drops, 1 DROP OU QID PRN for DRY 

EYES, (Reported)





Allergies


Coded Allergies:  


     No Known Allergies (Unverified , 7/26/19)





Past Medical History


Medical History


As noted in HPI.


Surgical History


Status post right parietal craniotomy with cystoperitoneal shunt placement in 

the past





Social History


* Smoker:  former Smoker


Alcohol:  Denies





Review of Systems


Other systems


Unable to fully obtain review of systems secondary to underlying dementia





Physical Examination


General Exam:  Positive: Cooperative, No Acute Distress, Other (patient oriented

to place, name, date of birth)


ENT Exam:  Positive: Atraumatic; 


   Negative: Mucous membr. moist/pink (dry mucous membranes)


Neck Exam:  Negative: JVD


Chest Exam:  Positive: Clear to auscultation, Normal air movement


Heart Exam:  Positive: Rate Normal, Normal S1, Normal S2


Abdomen Exam:  Positive: Soft; 


   Negative: Tenderness


Extremity Exam:  Negative: Tenderness, Swelling





Vital Signs





Vital Signs








  Date Time  Temp Pulse Resp B/P (MAP) Pulse Ox O2 Delivery O2 Flow Rate FiO2


 


7/26/19 16:55  89 18 108/58 (75) 94 Room Air  


 


7/26/19 12:45 96.6       











Laboratory Data


Labs 24H


Laboratory Tests 2


7/26/19 13:17: 


Nucleated Red Blood Cells % (auto) 0.0, Neutrophils 82H, Lymphocytes (Manual) 

14L, Monocytes (Manual) 3, Basophils (Manual) 1, Platelet Estimate NORMAL, Red 

Blood Cell Morphology NORMAL, Anion Gap 13, Glomerular Filtration Rate 32.3L, 

Lactic Acid Level 4.9*H, Calcium Level 10.6H, Aspartate Amino Transf (AST/SGOT) 

24, Alanine Aminotransferase (ALT/SGPT) 29, Alkaline Phosphatase 113, Total Bi

lirubin 1.1H, Direct Bilirubin 0.3H, Ammonia 11, Total Creatine Kinase 176, 

Creatine Kinase MB 4.2H, Creatine Kinase MB Relative Index 2.39, Troponin I < 

0.02, Total Protein 9.1H, Albumin 4.8, Albumin/Globulin Ratio 1.12, Thyroid 

Stimulating Hormone (TSH) 0.274L, Salicylates Level < 1.7L, Acetaminophen Level 

< 2.0L, Ethyl Alcohol Level < 0.003


CBC/BMP


Laboratory Tests


7/26/19 13:17








Red Blood Count 6.64 H, Mean Corpuscular Volume 87.7, Mean Corpuscular 

Hemoglobin 28.3, Mean Corpuscular Hemoglobin Concent 32.3, Red Cell Distribution

Width 14.3





Plan / VTE


VTE Prophylaxis Ordered?:  Yes





Plan


Plan


Intractable Nausea and Vomiting possibly 2/2 Gastroenteritis


CT Abd/Pel pending


IVF Hydration and antiemetic therapy ordered


We will keep the patient NPO for now


We will cont to monitor his progress





Lactic Acidosis 2/2 Above


Patient with no signs of acute abdomen


CT abdomen/pelvis pending


IV fluid hydration ordered


We will follow-up with a lactic acid level





History of subarachnoid cyst


Status post right parietal craniotomy with cystoperitoneal shunt placement in 

the past


CT head/shunt study with no acute findings


Follow-up as an outpatient





History of chronic kidney disease stage III


Baseline serum creatinine appears to be around 1.6 based on lab work from 2018


Serum creatinine 2.1 on admission


Hold nephrotoxins


Repeat BMP in the a.m.





History of hypertension


History of dyslipidemia


History of diabetes mellitus


History of dementia


DVT prophylaxis--SCDs/ESHA Bartlett MD              Jul 26, 2019 17:32

## 2019-07-26 NOTE — REP
CT ABDOMEN AND PELVIS WITHOUT CONTRAST:

 

CT abdomen and pelvis performed without oral or IV contrast. Sagittal and coronal

reconstruction images are performed.

 

COMPARISON: Comparison made with prior study of 01/23/2018.

 

In the visualized lung bases, there is a new nodule in the right middle lobe 1.3

cm in diameter. Another new nodule is seen in the lingula inferiorly, 7 mm in

diameter. These were not seen on the prior study.

 

Small hypodense nodule in the left lobe of the liver is stable 1.2 cm in

diameter. The spleen is unremarkable. The adrenals and pancreas are grossly

unremarkable. There appear to be a couple of subcentimeter cysts in the right

kidney. Calcified nodule in the upper left kidney medially is unchanged. There

appears to be a cyst in the lower pole of the left kidney. There is no

hydroureteronephrosis. There is moderate atherosclerotic calcification of the

abdominal aorta without aneurysm. I see no adenopathy. There is no free air or

free fluid. There are scattered mildly dilated small bowel loops predominantly

proximally. There is mesenteric edema. I do not see definite bowel wall

thickening. There is evidence of prior bowel surgery in the right mid abdomen

with surgical sutures noted. These were seen on the prior study. Although I do

not see a discrete transition point, there may be some degree of small bowel

obstruction, probably partial. The patient has had a prior appendectomy. There is

moderate air and fecal material throughout the colon predominantly in the rectum.

The urinary bladder is not well distended and not well evaluated. There is a

small left inguinal hernia containing fat. There are degenerative changes of the

spine. A ventricular peritoneal shunt is seen with the distal tip posterior to

the liver.

 

IMPRESSION:

 

Two new pulmonary nodules appear suspicious, one in the right middle lobe 1.3 cm

in diameter, one in the lingula 7 mm in diameter. Recommend CT of the chest.

 

Scattered mild dilatation of small bowel predominantly proximally with some

scattered mesenteric edema. Findings raise the possibility of some degree of

small bowel obstruction probably partial. Potentially this could represent an

ileus. No significant free fluid. No free air. No definite bowel inflammation.

 

 

Electronically Signed by

Trenton Truong MD 07/30/2019 05:41 P

## 2019-07-26 NOTE — REP
REASON: Altered mental status.

 

COMPARISON:  03/20/2018 and 03/19/2018.

 

There is no change in the appearance of the shunt when compared to 03/20/2018.

When the more distal components of the shunt seen in the neck, chest, and abdomen

are reviewed, the shunt appears contiguous.

 

IMPRESSION:

 

As above.

 

 

Electronically Signed by

William Wallace DO 07/26/2019 03:22 P

## 2019-07-26 NOTE — REP
REASON:  Altered mental status.

 

COMPARISON EXAMINATION:  Multiple, the latest two 01/23/2018 and 09/14/2018, with

multiple older priors as well.

 

The exam is unchanged from the latest two priors. There is encephalomalacial

change in the right frontal lobe, status quo. Previous postoperative change from

subdural drainage catheter placement, unchanged. Craniotomy defect on the right

is unchanged. There is no evidence of an acute intracranial hemorrhagic or

nonhemorrhagic event.

 

IMPRESSION:

 

There is no significant change compared to the prior exams, as described above.

 

 

Electronically Signed by

William Wallace DO 07/26/2019 03:21 P

## 2019-07-27 VITALS — SYSTOLIC BLOOD PRESSURE: 102 MMHG | DIASTOLIC BLOOD PRESSURE: 56 MMHG

## 2019-07-27 VITALS — DIASTOLIC BLOOD PRESSURE: 65 MMHG | SYSTOLIC BLOOD PRESSURE: 127 MMHG

## 2019-07-27 VITALS — DIASTOLIC BLOOD PRESSURE: 74 MMHG | SYSTOLIC BLOOD PRESSURE: 143 MMHG

## 2019-07-27 VITALS — DIASTOLIC BLOOD PRESSURE: 58 MMHG | SYSTOLIC BLOOD PRESSURE: 110 MMHG

## 2019-07-27 VITALS — DIASTOLIC BLOOD PRESSURE: 70 MMHG | SYSTOLIC BLOOD PRESSURE: 128 MMHG

## 2019-07-27 VITALS — SYSTOLIC BLOOD PRESSURE: 101 MMHG | DIASTOLIC BLOOD PRESSURE: 59 MMHG

## 2019-07-27 LAB
ALBUMIN SERPL BCG-MCNC: 3.2 GM/DL (ref 3.2–5.2)
ALT SERPL W P-5'-P-CCNC: 22 U/L (ref 12–78)
APPEARANCE UR: CLEAR
BACTERIA UR QL AUTO: NEGATIVE
BILIRUB SERPL-MCNC: 1 MG/DL (ref 0.2–1)
BILIRUB UR QL STRIP.AUTO: NEGATIVE
BUN SERPL-MCNC: 52 MG/DL (ref 7–18)
CALCIUM SERPL-MCNC: 8.2 MG/DL (ref 8.8–10.2)
CHLORIDE SERPL-SCNC: 111 MEQ/L (ref 98–107)
CO2 SERPL-SCNC: 27 MEQ/L (ref 21–32)
CREAT SERPL-MCNC: 2.67 MG/DL (ref 0.7–1.3)
GFR SERPL CREATININE-BSD FRML MDRD: 24.8 ML/MIN/{1.73_M2} (ref 42–?)
GLUCOSE SERPL-MCNC: 110 MG/DL (ref 70–100)
GLUCOSE UR QL STRIP.AUTO: NEGATIVE MG/DL
HCT VFR BLD AUTO: 43.5 % (ref 42–52)
HGB BLD-MCNC: 14.2 G/DL (ref 13.5–17.5)
HGB UR QL STRIP.AUTO: (no result)
KETONES UR QL STRIP.AUTO: NEGATIVE MG/DL
LEUKOCYTE ESTERASE UR QL STRIP.AUTO: NEGATIVE
MAGNESIUM SERPL-MCNC: 2 MG/DL (ref 1.8–2.4)
MCH RBC QN AUTO: 27.8 PG (ref 27–33)
MCHC RBC AUTO-ENTMCNC: 32.6 G/DL (ref 32–36.5)
MCV RBC AUTO: 85.3 FL (ref 80–96)
MUCOUS THREADS URNS QL MICRO: (no result)
NITRITE UR QL STRIP.AUTO: NEGATIVE
PH UR STRIP.AUTO: 5 UNITS (ref 5–9)
PLATELET # BLD AUTO: 203 10^3/UL (ref 150–450)
POTASSIUM SERPL-SCNC: 4.5 MEQ/L (ref 3.5–5.1)
PROT SERPL-MCNC: 6.9 GM/DL (ref 6.4–8.2)
PROT UR QL STRIP.AUTO: NEGATIVE MG/DL
RBC # BLD AUTO: 5.1 10^6/UL (ref 4.3–6.1)
RBC # UR AUTO: 5 /HPF (ref 0–3)
SODIUM SERPL-SCNC: 144 MEQ/L (ref 136–145)
SP GR UR STRIP.AUTO: 1.01 (ref 1–1.03)
SQUAMOUS #/AREA URNS AUTO: 0 /HPF (ref 0–6)
UROBILINOGEN UR QL STRIP.AUTO: 0.2 MG/DL (ref 0–2)
WBC # BLD AUTO: 13.5 10^3/UL (ref 4–10)
WBC #/AREA URNS AUTO: 2 /HPF (ref 0–3)

## 2019-07-27 RX ADMIN — DEXTROSE MONOHYDRATE SCH MG: 50 INJECTION, SOLUTION INTRAVENOUS at 09:47

## 2019-07-27 RX ADMIN — SUCRALFATE SCH GM: 1 TABLET ORAL at 09:47

## 2019-07-27 RX ADMIN — SUCRALFATE SCH GM: 1 TABLET ORAL at 20:10

## 2019-07-27 RX ADMIN — SUCRALFATE SCH GM: 1 TABLET ORAL at 17:36

## 2019-07-27 RX ADMIN — DEXTROSE MONOHYDRATE SCH MG: 50 INJECTION, SOLUTION INTRAVENOUS at 20:09

## 2019-07-27 RX ADMIN — SUCRALFATE SCH GM: 1 TABLET ORAL at 11:52

## 2019-07-27 NOTE — ECGEPIP
[facility] - ED

                                       

                                       Test Date:    2019

Pat Name:     CHET OSWALD          Department:   

Patient ID:   [id number]                 Room:         [place] [address]

Gender:       Male                     Technician:   [email or web address]

:          1940               Requested By: MILLER WHALEY

Order Number: [id number]     Reading MD:   Evelin Segura

                                 Measurements

Intervals                              Axis          

Rate:         97                       P:            56

MA:           190                      QRS:          28

QRSD:         97                       T:            76

QT:           340                                    

QTc:          433                                    

                           Interpretive Statements

SINUS RHYTHM

VOLTAGE CRITERIA FOR LVH

NONSPECIFIC T-WAVE ABNORMALITY

INCREASED RATE 3/13/18

Electronically Signed on 2019 9:33:05 EDT by Evelin Segura

## 2019-07-27 NOTE — IPNPDOC
Subjective


Date Seen


The patient was seen on 7/27/19.





Subjective


Chief Complaint/HPI


Patient seen and examined at the bedside. Reports that he is feeling better and 

ready to go home today. Denies any more episodes of nausea, vomiting, or 

abdominal pain.





Objective


Physical Examination


General Exam:  Positive: Alert, Cooperative, No Acute Distress


ENT Exam:  Positive: Atraumatic, Mucous membr. moist/pink


Neck Exam:  Negative: JVD


Chest Exam:  Positive: Clear to auscultation, Normal air movement


Heart Exam:  Positive: Rate Normal, Normal S1, Normal S2


Abdomen Exam:  Positive: Soft; 


   Negative: Tenderness


Extremity Exam:  Negative: Tenderness, Swelling


Psych Exam:  Positive: Other (patient oriented to place, birthdate, name but not

year, situation)





Assessment /Plan


Plan/VTE


VTE Prophylaxis Ordered?:  Yes





Plan


Intractable Nausea and Vomiting possibly 2/2 Gastroenteritis


CT Abd/Pel notable for mild dilatation of small bowel predominantly proximally 

with some scattered mesenteric edema, possibility of partial small bowel 

obstruction versus ileus.


I did discuss these findings and reviewed the imaging with Dr. Steiner of [facility]

[facility] last night---He thinks this may be ileus related to gastroenteritis. We 

will start the patient on sips of water, and advance his diet as tolerated if he

does not have any more vomiting, or complaints of abdominal pain. Reglan ordered


We will cont to monitor his progress





Lactic Acidosis 2/2 Above


Resolved following IVF Hydration





HETAL superimposed on chronic kidney disease stage III


Baseline serum creatinine appears to be around 1.6 based on lab work from 2018


Serum creatinine 2.1-->2.67 this AM


No hydronephrosis noted on CT abd/pel


Likely 2/2 hypotension, dehydration on admission


Cont IVF Hydration


Hold Nephrotoxins


UA ordered


Monitor I/O's


We will cont to follow up with BMP





Bilateral Pulmonary nodules with measuring up to 11.6 cm in RML, postinfectious 

inflammatory vs metastatic lesions noted on CT Chest


Patient will need to follow up with a repeat CT Chest to further assess this


Discussed results, implications and the need to follow up with the patient's 

Daughter and Son at the bedside who have verbalized understanding of the same.





History of subarachnoid cyst


Status post right parietal craniotomy with cystoperitoneal shunt placement in 

the past


CT head/shunt study with no acute findings


Follow-up as an outpatient





History of hypertension


History of dyslipidemia


History of diabetes mellitus


History of dementia


DVT prophylaxis--SCDs/TEDs





VS, I&O, 24H, [facility]


Vital Signs/I&O





Vital Signs








  Date Time  Temp Pulse Resp B/P (MAP) Pulse Ox O2 Delivery O2 Flow Rate FiO2


 


7/27/19 12:00 97.6 58 18 127/65 (85) 97   


 


7/26/19 16:55      Room Air  














I&O- Last 24 Hours up to 6 AM 


 


 7/27/19





 06:00


 


Intake Total 2600 ml


 


Output Total 0 ml


 


Balance 2600 ml











Laboratory Data


24H LABS


Laboratory Tests 2


7/26/19 13:17: 


Nucleated Red Blood Cells % (auto) 0.0, Neutrophils 82H, Lymphocytes (Manual) 

14L, Monocytes (Manual) 3, Basophils (Manual) 1, Platelet Estimate NORMAL, Red 

Blood Cell Morphology NORMAL, Anion Gap 13, Glomerular Filtration Rate 32.3L, 

Lactic Acid Level 4.9*H, Calcium Level 10.6H, Aspartate Amino Transf (AST/SGOT) 

24, Alanine Aminotransferase (ALT/SGPT) 29, Alkaline Phosphatase 113, Total 

Bilirubin 1.1H, Direct Bilirubin 0.3H, Ammonia 11, Total Creatine Kinase 176, 

Creatine Kinase MB 4.2H, Creatine Kinase MB Relative Index 2.39, Troponin I < 

0.02, Total Protein 9.1H, Albumin 4.8, Albumin/Globulin Ratio 1.12, Thyroid 

Stimulating Hormone (TSH) 0.274L, Salicylates Level < 1.7L, Acetaminophen Level 

< 2.0L, Ethyl Alcohol Level < 0.003


7/26/19 18:12: Lactic Acid Followup at 4 Hours 4.1*H


7/26/19 21:04: Bedside Glucose (Misc Panel) 166H


7/27/19 05:11: 


Nucleated Red Blood Cells % (auto) 0.0, Anion Gap 6L, Glomerular Filtration Rate

24.8L, Calcium Level 8.2#L, Aspartate Amino Transf (AST/SGOT) 30, Alanine 

Aminotransferase (ALT/SGPT) 22, Alkaline Phosphatase 67, Total Bilirubin 1.0, 

Total Protein 6.9#, Albumin 3.2#, Albumin/Globulin Ratio 0.86L, Blood Urea 

Nitrogen 52#H, Creatinine 2.67H, Sodium Level 144, Potassium Level 4.5, Chloride

Level 111H, Carbon Dioxide Level 27, Magnesium Level 2.0


7/27/19 07:51: Lactic Acid Level 1.0


CBC/BMP


Laboratory Tests


7/26/19 13:17








Red Blood Count 6.64 H, Mean Corpuscular Volume 87.7, Mean Corpuscular 

Hemoglobin 28.3, Mean Corpuscular Hemoglobin Concent 32.3, Red Cell Distribution

Width 14.3





7/27/19 05:11








Red Blood Count 5.10, Mean Corpuscular Volume 85.3, Mean Corpuscular Hemoglobin 

27.8, Mean Corpuscular Hemoglobin Concent 32.6, Red Cell Distribution Width 14.2

, Calcium Level 8.2 #L, Aspartate Amino Transf (AST/SGOT) 30, Alanine 

Aminotransferase (ALT/SGPT) 22, Alkaline Phosphatase 67, Total Bilirubin 1.0, 

Total Protein 6.9 #, Albumin 3.2 #











ESHA SMALLWOOD MD              Jul 27, 2019 12:57

## 2019-07-28 VITALS — SYSTOLIC BLOOD PRESSURE: 163 MMHG | DIASTOLIC BLOOD PRESSURE: 79 MMHG

## 2019-07-28 VITALS — SYSTOLIC BLOOD PRESSURE: 170 MMHG | DIASTOLIC BLOOD PRESSURE: 92 MMHG

## 2019-07-28 VITALS — SYSTOLIC BLOOD PRESSURE: 138 MMHG | DIASTOLIC BLOOD PRESSURE: 80 MMHG

## 2019-07-28 VITALS — SYSTOLIC BLOOD PRESSURE: 145 MMHG | DIASTOLIC BLOOD PRESSURE: 72 MMHG

## 2019-07-28 VITALS — SYSTOLIC BLOOD PRESSURE: 148 MMHG | DIASTOLIC BLOOD PRESSURE: 73 MMHG

## 2019-07-28 VITALS — SYSTOLIC BLOOD PRESSURE: 155 MMHG | DIASTOLIC BLOOD PRESSURE: 72 MMHG

## 2019-07-28 LAB
ALBUMIN SERPL BCG-MCNC: 3.5 GM/DL (ref 3.2–5.2)
ALT SERPL W P-5'-P-CCNC: 28 U/L (ref 12–78)
BILIRUB SERPL-MCNC: 1.2 MG/DL (ref 0.2–1)
BUN SERPL-MCNC: 45 MG/DL (ref 7–18)
CALCIUM SERPL-MCNC: 8.3 MG/DL (ref 8.8–10.2)
CHLORIDE SERPL-SCNC: 105 MEQ/L (ref 98–107)
CO2 SERPL-SCNC: 26 MEQ/L (ref 21–32)
CREAT SERPL-MCNC: 1.19 MG/DL (ref 0.7–1.3)
GFR SERPL CREATININE-BSD FRML MDRD: > 60 ML/MIN/{1.73_M2} (ref 42–?)
GLUCOSE SERPL-MCNC: 128 MG/DL (ref 70–100)
HCT VFR BLD AUTO: 46.7 % (ref 42–52)
HGB BLD-MCNC: 15.2 G/DL (ref 13.5–17.5)
MCH RBC QN AUTO: 28 PG (ref 27–33)
MCHC RBC AUTO-ENTMCNC: 32.5 G/DL (ref 32–36.5)
MCV RBC AUTO: 86 FL (ref 80–96)
PLATELET # BLD AUTO: 178 10^3/UL (ref 150–450)
POTASSIUM SERPL-SCNC: 3.6 MEQ/L (ref 3.5–5.1)
PROT SERPL-MCNC: 7.6 GM/DL (ref 6.4–8.2)
RBC # BLD AUTO: 5.43 10^6/UL (ref 4.3–6.1)
SODIUM SERPL-SCNC: 139 MEQ/L (ref 136–145)
WBC # BLD AUTO: 11.2 10^3/UL (ref 4–10)

## 2019-07-28 RX ADMIN — DEXTROSE MONOHYDRATE SCH MG: 50 INJECTION, SOLUTION INTRAVENOUS at 08:53

## 2019-07-28 RX ADMIN — SUCRALFATE SCH GM: 1 TABLET ORAL at 08:53

## 2019-07-28 RX ADMIN — DOCUSATE SODIUM SCH MG: 100 CAPSULE, LIQUID FILLED ORAL at 21:51

## 2019-07-28 RX ADMIN — INSULIN LISPRO SCH UNITS: 100 INJECTION, SOLUTION INTRAVENOUS; SUBCUTANEOUS at 13:50

## 2019-07-28 RX ADMIN — MULTIPLE VITAMINS W/ MINERALS TAB SCH TAB: TAB at 13:48

## 2019-07-28 RX ADMIN — DONEPEZIL HYDROCHLORIDE SCH MG: 5 TABLET, FILM COATED ORAL at 21:52

## 2019-07-28 RX ADMIN — SUCRALFATE SCH GM: 1 TABLET ORAL at 21:52

## 2019-07-28 RX ADMIN — SUCRALFATE SCH GM: 1 TABLET ORAL at 13:48

## 2019-07-28 RX ADMIN — SIMVASTATIN SCH MG: 20 TABLET, FILM COATED ORAL at 21:51

## 2019-07-28 RX ADMIN — MEMANTINE SCH MG: 5 TABLET ORAL at 13:48

## 2019-07-28 RX ADMIN — ENOXAPARIN SODIUM SCH MG: 40 INJECTION SUBCUTANEOUS at 13:50

## 2019-07-28 RX ADMIN — MEMANTINE SCH MG: 5 TABLET ORAL at 21:52

## 2019-07-28 RX ADMIN — INSULIN LISPRO SCH UNITS: 100 INJECTION, SOLUTION INTRAVENOUS; SUBCUTANEOUS at 21:00

## 2019-07-28 RX ADMIN — DOCUSATE SODIUM SCH MG: 100 CAPSULE, LIQUID FILLED ORAL at 13:47

## 2019-07-28 RX ADMIN — SUCRALFATE SCH GM: 1 TABLET ORAL at 18:19

## 2019-07-28 RX ADMIN — INSULIN LISPRO SCH UNITS: 100 INJECTION, SOLUTION INTRAVENOUS; SUBCUTANEOUS at 19:02

## 2019-07-28 RX ADMIN — DEXTROSE MONOHYDRATE SCH MG: 50 INJECTION, SOLUTION INTRAVENOUS at 21:52

## 2019-07-28 RX ADMIN — ASPIRIN SCH MG: 81 TABLET ORAL at 13:48

## 2019-07-28 NOTE — IPNPDOC
Subjective


Date Seen


The patient was seen on 7/28/19.





Subjective


Chief Complaint/HPI


Patient seen and examined at the bedside. No acute overnight events noted.





Objective


Physical Examination


General Exam:  Positive: Alert, Cooperative, No Acute Distress


ENT Exam:  Positive: Atraumatic, Mucous membr. moist/pink


Neck Exam:  Negative: JVD


Chest Exam:  Positive: Clear to auscultation, Normal air movement


Heart Exam:  Positive: Rate Normal, Normal S1, Normal S2


Abdomen Exam:  Positive: Soft; 


   Negative: Tenderness


Extremity Exam:  Negative: Tenderness, Swelling


Psych Exam:  Positive: Other (patient oriented to place, birthdate, name but not

year, situation)





Assessment /Plan


Plan/VTE


VTE Prophylaxis Ordered?:  Yes





Plan


Intractable Nausea and Vomiting possibly 2/2 Gastroenteritis


Patient tolerated a clear liquid diet yesterday without any acute complaints.


Follow-up CT scan of the abdomen/pelvis revealed resolution of small bowel loop 

dilatation


The patient did have a rather large formed bowel movement following CT scan of 

the abdomen/pelvis


We will advance the patient's diet as tolerated





Lactic Acidosis 2/2 Above


Resolved following IVF Hydration





HETAL superimposed on chronic kidney disease stage III, resolved


Serum Cr back to baseline





Bilateral Pulmonary nodules measuring up to 1.3 cm in RML, postinfectious 

inflammatory vs metastatic lesions noted on CT Chest


Patient will need to follow up with a repeat CT Chest to further assess this


Discussed results, implications and the need to follow up with the patient's 

Daughter and Son at the bedside who have verbalized understanding of the same.





History of subarachnoid cyst


Status post right parietal craniotomy with cystoperitoneal shunt placement in 

the past


CT head/shunt study with no acute findings


Follow-up as an outpatient





History of hypertension


Norvasc added to regimen, we'll hold ACE-I for now





History of dyslipidemia


Cont Statin





History of diabetes mellitus


Insulin sliding scale ordered





History of dementia


Cont donepezil, memantine





DVT prophylaxis


Lovenox





Disposition-pending functional optimization with PT





VS, I&O, 24H, [facility]


Vital Signs/I&O





Vital Signs








  Date Time  Temp Pulse Resp B/P (MAP) Pulse Ox O2 Delivery O2 Flow Rate FiO2


 


7/28/19 08:00 98.1 96 18 163/79 (107) 93   


 


7/26/19 16:55      Room Air  














I&O- Last 24 Hours up to 6 AM 


 


 7/28/19





 06:00


 


Intake Total 1140 ml


 


Output Total 2110 ml


 


Balance -970 ml











Laboratory Data


24H LABS


Laboratory Tests 2


7/27/19 20:02: Bedside Glucose (Misc Panel) 118H


7/27/19 22:03: 


Urine Appearance CLEAR, Urine Color YELLOW, Urine pH 5.0, Urine Specific Gravity

1.013, Urine Protein NEGATIVE, Urine Glucose (UA) NEGATIVE, Urine Ketones 

NEGATIVE, Urine Urobilinogen 0.2, Urine Bilirubin NEGATIVE, Urine Leukocyte 

Esterase NEGATIVE, Urine Blood 1+H, Urine Nitrite NEGATIVE, Urine WBC (Auto) 2, 

Urine RBC (Auto) 5H, Urine Hyaline Casts (Auto) 0, Urine Bacteria (Auto) NE

GATIVE, Urine Squamous Epithelial Cells 0, Urine Mucus (Auto) SMALL, Urine Sperm

(Auto) 


7/28/19 04:47: 


Nucleated Red Blood Cells % (auto) 0.0, Anion Gap 8, Glomerular Filtration Rate 

> 60.0, Blood Urea Nitrogen 45H, Creatinine 1.19#, Sodium Level 139, Potassium 

Level 3.6, Chloride Level 105, Carbon Dioxide Level 26, Calcium Level 8.3L, 

Aspartate Amino Transf (AST/SGOT) 40H, Alanine Aminotransferase (ALT/SGPT) 28, 

Alkaline Phosphatase 77, Total Bilirubin 1.2H, Total Protein 7.6, Albumin 3.5, 

Albumin/Globulin Ratio 0.85L


CBC/BMP


Laboratory Tests


7/28/19 04:47








Red Blood Count 5.43, Mean Corpuscular Volume 86.0, Mean Corpuscular Hemoglobin 

28.0, Mean Corpuscular Hemoglobin Concent 32.5, Red Cell Distribution Width 

13.8, Calcium Level 8.3 L, Aspartate Amino Transf (AST/SGOT) 40 H, Alanine 

Aminotransferase (ALT/SGPT) 28, Alkaline Phosphatase 77, Total Bilirubin 1.2 H, 

Total Protein 7.6, Albumin 3.5











ESHA SMALLWOOD MD              Jul 28, 2019 12:17

## 2019-07-29 VITALS — SYSTOLIC BLOOD PRESSURE: 134 MMHG | DIASTOLIC BLOOD PRESSURE: 66 MMHG

## 2019-07-29 VITALS — DIASTOLIC BLOOD PRESSURE: 61 MMHG | SYSTOLIC BLOOD PRESSURE: 129 MMHG

## 2019-07-29 VITALS — DIASTOLIC BLOOD PRESSURE: 55 MMHG | SYSTOLIC BLOOD PRESSURE: 136 MMHG

## 2019-07-29 VITALS — SYSTOLIC BLOOD PRESSURE: 116 MMHG | DIASTOLIC BLOOD PRESSURE: 59 MMHG

## 2019-07-29 VITALS — SYSTOLIC BLOOD PRESSURE: 146 MMHG | DIASTOLIC BLOOD PRESSURE: 68 MMHG

## 2019-07-29 VITALS — DIASTOLIC BLOOD PRESSURE: 61 MMHG | SYSTOLIC BLOOD PRESSURE: 118 MMHG

## 2019-07-29 LAB
ALBUMIN SERPL BCG-MCNC: 3 GM/DL (ref 3.2–5.2)
ALT SERPL W P-5'-P-CCNC: 1321 U/L (ref 12–78)
BILIRUB SERPL-MCNC: 5.1 MG/DL (ref 0.2–1)
BUN SERPL-MCNC: 39 MG/DL (ref 7–18)
CALCIUM SERPL-MCNC: 8.5 MG/DL (ref 8.8–10.2)
CHLORIDE SERPL-SCNC: 106 MEQ/L (ref 98–107)
CO2 SERPL-SCNC: 31 MEQ/L (ref 21–32)
CREAT SERPL-MCNC: 1.13 MG/DL (ref 0.7–1.3)
GFR SERPL CREATININE-BSD FRML MDRD: > 60 ML/MIN/{1.73_M2} (ref 42–?)
GLUCOSE SERPL-MCNC: 124 MG/DL (ref 70–100)
HBV CORE IGM SER QL: NEGATIVE
HBV SURFACE AB SER-ACNC: NEGATIVE M[IU]/ML
HCT VFR BLD AUTO: 42.3 % (ref 42–52)
HCV AB SER QL: 0.1 INDEX (ref ?–0.8)
HEPATITIS A ANTIBODY IGM: NEGATIVE
HGB BLD-MCNC: 13.7 G/DL (ref 13.5–17.5)
MCH RBC QN AUTO: 27.7 PG (ref 27–33)
MCHC RBC AUTO-ENTMCNC: 32.4 G/DL (ref 32–36.5)
MCV RBC AUTO: 85.5 FL (ref 80–96)
PLATELET # BLD AUTO: 120 10^3/UL (ref 150–450)
POTASSIUM SERPL-SCNC: 3.3 MEQ/L (ref 3.5–5.1)
PROT SERPL-MCNC: 6.9 GM/DL (ref 6.4–8.2)
RBC # BLD AUTO: 4.95 10^6/UL (ref 4.3–6.1)
SODIUM SERPL-SCNC: 141 MEQ/L (ref 136–145)
WBC # BLD AUTO: 7.2 10^3/UL (ref 4–10)

## 2019-07-29 RX ADMIN — INSULIN LISPRO SCH UNITS: 100 INJECTION, SOLUTION INTRAVENOUS; SUBCUTANEOUS at 17:03

## 2019-07-29 RX ADMIN — SUCRALFATE SCH GM: 1 TABLET ORAL at 12:00

## 2019-07-29 RX ADMIN — SUCRALFATE SCH GM: 1 TABLET ORAL at 09:11

## 2019-07-29 RX ADMIN — DEXTROSE MONOHYDRATE SCH MG: 50 INJECTION, SOLUTION INTRAVENOUS at 21:20

## 2019-07-29 RX ADMIN — INSULIN LISPRO SCH UNITS: 100 INJECTION, SOLUTION INTRAVENOUS; SUBCUTANEOUS at 12:00

## 2019-07-29 RX ADMIN — INSULIN LISPRO SCH UNITS: 100 INJECTION, SOLUTION INTRAVENOUS; SUBCUTANEOUS at 07:30

## 2019-07-29 RX ADMIN — ASPIRIN SCH MG: 81 TABLET ORAL at 09:10

## 2019-07-29 RX ADMIN — MULTIPLE VITAMINS W/ MINERALS TAB SCH TAB: TAB at 09:11

## 2019-07-29 RX ADMIN — DEXTROSE MONOHYDRATE SCH MG: 50 INJECTION, SOLUTION INTRAVENOUS at 09:12

## 2019-07-29 RX ADMIN — SODIUM CHLORIDE, PRESERVATIVE FREE SCH ML: 5 INJECTION INTRAVENOUS at 21:20

## 2019-07-29 RX ADMIN — SODIUM CHLORIDE, PRESERVATIVE FREE SCH ML: 5 INJECTION INTRAVENOUS at 12:29

## 2019-07-29 RX ADMIN — DOCUSATE SODIUM SCH MG: 100 CAPSULE, LIQUID FILLED ORAL at 21:19

## 2019-07-29 RX ADMIN — MEMANTINE SCH MG: 5 TABLET ORAL at 09:12

## 2019-07-29 RX ADMIN — MEMANTINE SCH MG: 5 TABLET ORAL at 21:20

## 2019-07-29 RX ADMIN — DOCUSATE SODIUM SCH MG: 100 CAPSULE, LIQUID FILLED ORAL at 09:10

## 2019-07-29 RX ADMIN — INSULIN LISPRO SCH UNITS: 100 INJECTION, SOLUTION INTRAVENOUS; SUBCUTANEOUS at 21:00

## 2019-07-29 RX ADMIN — SIMVASTATIN SCH MG: 20 TABLET, FILM COATED ORAL at 21:20

## 2019-07-29 RX ADMIN — ENOXAPARIN SODIUM SCH MG: 40 INJECTION SUBCUTANEOUS at 09:12

## 2019-07-29 RX ADMIN — SUCRALFATE SCH GM: 1 TABLET ORAL at 21:22

## 2019-07-29 RX ADMIN — DONEPEZIL HYDROCHLORIDE SCH MG: 5 TABLET, FILM COATED ORAL at 21:19

## 2019-07-29 RX ADMIN — SUCRALFATE SCH GM: 1 TABLET ORAL at 17:14

## 2019-07-29 NOTE — REP
CT of the abdomen and pelvis without IV or bowel contrast:

Comparison is 07/26/2019.

The within the visualized lower lung fields.  The 1.3 cm right middle lobe lung

nodule is unchanged and the 7 ml lingular lung nodule is unchanged.

The small hypodensity in the left lobe of liver is unchanged.

There is a tiny volume of ascites surrounding the liver as an interval change.

The gallbladder wall is mildly thickened.  This may be secondary to the ascites.

The gallbladder is otherwise unremarkable.

Pancreas, spleen, adrenals and kidneys are unchanged.

The distended small bowel loops on the prior study have significantly improved.

The mesentery is unremarkable.

Abdominal aorta is unremarkable.

Pelvis:

The bladder is unremarkable.  There is no ascites.  The pelvic bowel loops are

unremarkable.

Impression:

The distended bowel loops on the comparison CT have significantly improved.

There is a small volume of ascites adjacent to the liver as an interval change.

The two lung nodules are unchanged.

There are surgical clips in relation to small bowel loops.  The patient

reportedly has had surgery for Meckel's diverticulum.  This is unchanged.

 

 

Electronically Signed by

Trenton Gardner MD 07/28/2019 10:01 A

## 2019-07-29 NOTE — REP
Clinical:  Elevated liver function tests.

 

Technique:  Real time gray scale ultrasound examination using curved array

transducer.

 

Findings:

Liver and visualized pancreas are normal in contour, size, echogenicity without

focal hepatic or pancreatic lesion identified.  Gallbladder demonstrates

tumefactive sludge with subtle wall thickening, but no pericholecystic fluid.  No

biliary ductal dilatation is appreciated and the common bile duct measures 5 mm

diameter.  The right kidney is normal in reniform shape without hydronephrosis

and measures 12.6 x 6.2 x 6.0 cm.  No ascites.

 

Impression:

Tumefactive sludge within the gallbladder.

Otherwise normal right upper quadrant abdominal ultrasound.

 

 

Electronically Signed by

Dino Aldana MD 07/29/2019 03:11 P

## 2019-07-29 NOTE — IPNPDOC
Subjective


Date Seen


The patient was seen on 7/29/19.





Subjective


Chief Complaint/HPI


Patient seen and examined at the bedside this morning. He denies any new 

complaints at this time. States that he has been able to tolerate a diet, and 

had a large bowel movement yesterday. Denies any acute complaints at this time.





Objective


Physical Examination


General Exam:  Positive: Alert, Cooperative, No Acute Distress


ENT Exam:  Positive: Atraumatic, Mucous membr. moist/pink


Neck Exam:  Negative: JVD


Chest Exam:  Positive: Clear to auscultation, Normal air movement


Heart Exam:  Positive: Rate Normal, Normal S1, Normal S2


Abdomen Exam:  Positive: Soft; 


   Negative: Tenderness


Extremity Exam:  Negative: Tenderness, Swelling


Psych Exam:  Positive: Other (patient oriented to place, birthdate, name but not

year, situation)





Assessment /Plan


Plan/VTE


VTE Prophylaxis Ordered?:  Yes





Plan


Elevated LFTs possibly 2/2 Biliary Obstruction?


CT Abd/Pel from 7/28 did reveal small volume of ascites adjacent to the liver, 

gallbladder was noted to be mildly thickened, but otherwise unremarkable


Unable to get an MRCP due to  Shunt


U/S of the Liver ordered for further evaluation of GB and biliary tract


Hepatitis panel ordered


We will cont to monitor LFTs 





Intractable Nausea and Vomiting possibly 2/2 Gastroenteritis, resolved


Follow-up CT scan of the abdomen/pelvis revealed resolution of small bowel loop 

dilatation on 7/28


The patient did have a rather large formed bowel movement on 7/28 as well


Tolerating a PO Diet





Lactic Acidosis 2/2 Above


Resolved following IVF Hydration





HETAL superimposed on chronic kidney disease stage III, resolved


Serum Cr back to baseline





Bilateral Pulmonary nodules measuring up to 1.3 cm in RML, postinfectious 

inflammatory vs metastatic lesions noted on CT Chest


Patient will need to follow up with a repeat CT Chest to further assess this


Discussed results, implications and the need to follow up with the patient's 

Daughter and Son at the bedside who have verbalized understanding of the same.





History of subarachnoid cyst


Status post right parietal craniotomy with cystoperitoneal shunt placement in 

the past


CT head/shunt study with no acute findings


Follow-up as an outpatient





History of hypertension


Norvasc added to regimen, we'll hold ACE-I for now





History of dyslipidemia


Cont Statin





History of diabetes mellitus


Insulin sliding scale ordered





History of dementia


Cont donepezil, memantine





DVT prophylaxis


Lovenox





Disposition-pending functional optimization with PT





VS, I&O, 24H, [facility]


Vital Signs/I&O





Vital Signs








  Date Time  Temp Pulse Resp B/P (MAP) Pulse Ox O2 Delivery O2 Flow Rate FiO2


 


7/29/19 09:11  74  129/61    


 


7/29/19 08:00 97.9  17  93   


 


7/26/19 16:55      Room Air  














I&O- Last 24 Hours up to 6 AM 


 


 7/29/19





 06:00


 


Intake Total 1080 ml


 


Output Total 915 ml


 


Balance 165 ml











Laboratory Data


24H LABS


Laboratory Tests 2


7/28/19 13:23: Bedside Glucose (Misc Panel) 148H


7/28/19 18:53: Bedside Glucose (Misc Panel) 176H


7/28/19 21:56: Bedside Glucose (Misc Panel) 115H


7/29/19 05:40: 


Nucleated Red Blood Cells % (auto) 0.0, Anion Gap 4L, Glomerular Filtration Rate

> 60.0, Blood Urea Nitrogen 39H, Creatinine 1.13, Sodium Level 141, Potassium 

Level 3.3L, Chloride Level 106, Carbon Dioxide Level 31, Calcium Level 8.5L, 

Aspartate Amino Transf (AST/SGOT) 875H, Alanine Aminotransferase (ALT/SGPT) 

1321H, Alkaline Phosphatase 396H, Total Bilirubin 5.1#H, Total Protein 6.9, 

Albumin 3.0L, Albumin/Globulin Ratio 0.77L


CBC/BMP


Laboratory Tests


7/29/19 05:40








Red Blood Count 4.95, Mean Corpuscular Volume 85.5, Mean Corpuscular Hemoglobin 

27.7, Mean Corpuscular Hemoglobin Concent 32.4, Red Cell Distribution Width 

13.7, Calcium Level 8.5 L, Aspartate Amino Transf (AST/SGOT) 875 H, Alanine 

Aminotransferase (ALT/SGPT) 1321 H, Alkaline Phosphatase 396 H, Total Bilirubin 

5.1 #H, Total Protein 6.9, Albumin 3.0 L











ESHA SMALLWOOD MD              Jul 29, 2019 10:19

## 2019-07-30 VITALS — SYSTOLIC BLOOD PRESSURE: 114 MMHG | DIASTOLIC BLOOD PRESSURE: 62 MMHG

## 2019-07-30 VITALS — SYSTOLIC BLOOD PRESSURE: 102 MMHG | DIASTOLIC BLOOD PRESSURE: 62 MMHG

## 2019-07-30 VITALS — SYSTOLIC BLOOD PRESSURE: 116 MMHG | DIASTOLIC BLOOD PRESSURE: 63 MMHG

## 2019-07-30 VITALS — DIASTOLIC BLOOD PRESSURE: 69 MMHG | SYSTOLIC BLOOD PRESSURE: 130 MMHG

## 2019-07-30 VITALS — SYSTOLIC BLOOD PRESSURE: 112 MMHG | DIASTOLIC BLOOD PRESSURE: 59 MMHG

## 2019-07-30 LAB
ALBUMIN SERPL BCG-MCNC: 2.6 GM/DL (ref 3.2–5.2)
ALT SERPL W P-5'-P-CCNC: 744 U/L (ref 12–78)
BILIRUB SERPL-MCNC: 3.2 MG/DL (ref 0.2–1)
BUN SERPL-MCNC: 34 MG/DL (ref 7–18)
CALCIUM SERPL-MCNC: 8.4 MG/DL (ref 8.8–10.2)
CHLORIDE SERPL-SCNC: 106 MEQ/L (ref 98–107)
CO2 SERPL-SCNC: 29 MEQ/L (ref 21–32)
CREAT SERPL-MCNC: 0.91 MG/DL (ref 0.7–1.3)
GFR SERPL CREATININE-BSD FRML MDRD: > 60 ML/MIN/{1.73_M2} (ref 42–?)
GLUCOSE SERPL-MCNC: 137 MG/DL (ref 70–100)
HCT VFR BLD AUTO: 38.3 % (ref 42–52)
HGB BLD-MCNC: 12.5 G/DL (ref 13.5–17.5)
MCH RBC QN AUTO: 28.2 PG (ref 27–33)
MCHC RBC AUTO-ENTMCNC: 32.6 G/DL (ref 32–36.5)
MCV RBC AUTO: 86.3 FL (ref 80–96)
PLATELET # BLD AUTO: 120 10^3/UL (ref 150–450)
POTASSIUM SERPL-SCNC: 3.4 MEQ/L (ref 3.5–5.1)
PROT SERPL-MCNC: 6.6 GM/DL (ref 6.4–8.2)
RBC # BLD AUTO: 4.44 10^6/UL (ref 4.3–6.1)
SODIUM SERPL-SCNC: 141 MEQ/L (ref 136–145)
WBC # BLD AUTO: 6.5 10^3/UL (ref 4–10)

## 2019-07-30 RX ADMIN — MULTIPLE VITAMINS W/ MINERALS TAB SCH TAB: TAB at 10:08

## 2019-07-30 RX ADMIN — SODIUM CHLORIDE, PRESERVATIVE FREE SCH ML: 5 INJECTION INTRAVENOUS at 14:22

## 2019-07-30 RX ADMIN — DEXTROSE MONOHYDRATE SCH MG: 50 INJECTION, SOLUTION INTRAVENOUS at 21:32

## 2019-07-30 RX ADMIN — INSULIN LISPRO SCH UNITS: 100 INJECTION, SOLUTION INTRAVENOUS; SUBCUTANEOUS at 21:00

## 2019-07-30 RX ADMIN — SODIUM CHLORIDE, PRESERVATIVE FREE SCH ML: 5 INJECTION INTRAVENOUS at 21:26

## 2019-07-30 RX ADMIN — DOCUSATE SODIUM SCH MG: 100 CAPSULE, LIQUID FILLED ORAL at 10:08

## 2019-07-30 RX ADMIN — SUCRALFATE SCH GM: 1 TABLET ORAL at 17:43

## 2019-07-30 RX ADMIN — INSULIN LISPRO SCH UNITS: 100 INJECTION, SOLUTION INTRAVENOUS; SUBCUTANEOUS at 10:06

## 2019-07-30 RX ADMIN — SUCRALFATE SCH GM: 1 TABLET ORAL at 21:30

## 2019-07-30 RX ADMIN — ENOXAPARIN SODIUM SCH MG: 40 INJECTION SUBCUTANEOUS at 10:07

## 2019-07-30 RX ADMIN — SUCRALFATE SCH GM: 1 TABLET ORAL at 08:05

## 2019-07-30 RX ADMIN — SODIUM CHLORIDE, PRESERVATIVE FREE SCH ML: 5 INJECTION INTRAVENOUS at 04:53

## 2019-07-30 RX ADMIN — ASPIRIN SCH MG: 81 TABLET ORAL at 10:07

## 2019-07-30 RX ADMIN — MEMANTINE SCH MG: 5 TABLET ORAL at 10:08

## 2019-07-30 RX ADMIN — MEMANTINE SCH MG: 5 TABLET ORAL at 21:31

## 2019-07-30 RX ADMIN — SODIUM CHLORIDE, PRESERVATIVE FREE SCH ML: 5 INJECTION INTRAVENOUS at 23:12

## 2019-07-30 RX ADMIN — DEXTROSE MONOHYDRATE SCH MG: 50 INJECTION, SOLUTION INTRAVENOUS at 10:06

## 2019-07-30 RX ADMIN — INSULIN LISPRO SCH UNITS: 100 INJECTION, SOLUTION INTRAVENOUS; SUBCUTANEOUS at 17:44

## 2019-07-30 RX ADMIN — SODIUM CHLORIDE SCH MLS/HR: 9 INJECTION, SOLUTION INTRAVENOUS at 16:16

## 2019-07-30 RX ADMIN — SUCRALFATE SCH GM: 1 TABLET ORAL at 11:44

## 2019-07-30 RX ADMIN — INSULIN LISPRO SCH UNITS: 100 INJECTION, SOLUTION INTRAVENOUS; SUBCUTANEOUS at 12:34

## 2019-07-30 RX ADMIN — SIMVASTATIN SCH MG: 20 TABLET, FILM COATED ORAL at 21:31

## 2019-07-30 RX ADMIN — DOCUSATE SODIUM SCH MG: 100 CAPSULE, LIQUID FILLED ORAL at 21:30

## 2019-07-30 NOTE — IPNPDOC
Subjective


Date Seen


The patient was seen on 7/30/19.


Time of service 2:40 PM





Subjective


Chief Complaint/HPI


Nausea and vomiting,


Events since last encounter


Per discussion with the nursing staff. The patient has been on clear liquid diet

since yesterday evening.


The patient has dementia and is not sure why he came to the hospital. Currently,

he denies having any pain.


The patient's son was at the bedside reports that the patient's nausea and 

vomiting have improved significantly.





Objective


Physical Examination


Other physical findings


VITALS: Vitals see below


GENERAL: Well-nourished, well-developed, seated up in bed in no apparent 

distress


HEENT: Normocephalic, atraumatic.


CHEST: Regular rate and rhythm, no murmurs, rubs or gallops, radial pulse intac

t.


LUNGS: Clear to auscultation bilaterally on room air.


Abdomen: Positive Bowel Sounds, Abdomen Is Distended, but Soft, but Nontender on

Palpation.


NEURO: Cranial Nerves II through XII Grossly Intact, Speech, Not Dysarthric.


PSYCH: Alert and oriented to person, place and time, able to understand and 

follow commands





Assessment /Plan


Assessment


Mr. Mayfield is a 79-year-old male with a past medical history of bilateral 

pulmonary nodules, subarachnoid cyst, status post right partial craniectomy with

cystoscopy peritoneal shunt, chronic hypertension, dyslipidemia, diabetes 

mellitus, and dementia. He was admitted for evaluation of nausea and vomiting 

which is now resolved. The patient continues to have transaminitis whose causes 

to be determined.





1. Nausea and vomiting - resolving


Possibly due to cholecystitis versus gastroenteritis.


Lactic acidosis, which has resolved was likely due to dehydration and possibly 

infection


The first CT of abdomen and pelvis showed findings consistent with mesenteric 

edema and partial SBO.


The second CT of the abdomen and pelvis showed ascites and gallbladder wall 

thickening and resolution of the partial SBO


Plan: Continue with clear liquid diet





2. Transaminitis.


Appears jaundiced according to his daughter


Not sure if this versus hepatic versus cholestatic.


Unable to get MRCP because of  shunt.


Ultrasound of the liver showed tumefactive sludge. Not sure if these findings 

represent a malignancy or gangrenous cholecystitis


Plan: Consult general surgery and GI /nothing by mouth with IV fluids tonight / 

will follow up with general surgery prior to ordering HIDA scan HIDA scan 





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


HETAL has resolved.


Plan: follow-up BMP daily





4. Bilateral pulmonary nodules.


The nodule in the right middle lobe was 1.3 cm in size and may be post 

inflammatory or metastatic.


Since this mass more than 1 cm in size the will may need a biopsy


Plan: Repeat CT on an outpatient basis





5. Low TSH.


May be secondary to euthyroid sick syndrome versus hypothyroidism.


Plan follow-up today free T3 and T4.





6 History of subarachnoid cyst.


Status post right partial craniectomy with CVP shunt.


Recent CT of the head done was unremarkable


Plan: Follow-up with neurologist on an outpatient basis





7 Chronic hypertension.


Plan: Continue with Norvasc/continue to hold Ace inhibitor





8. Dyslipidemia.


Plan continue statin





9. Diabetes mellitus.


Plan follow-up Accu-Cheks, A1c, and continue with sliding scale insulin





10. Dementia.


Plan: Hold donepezil as it can cause GI disturbance, continue with memantine





DVT prophylaxis withLovenox





Disposition-pending GI, general surgery, and PT Recommendations





Plan/VTE


VTE Prophylaxis Ordered?:  Yes





VS, I&O, 24H, [facility]


Vital Signs/I&O





Vital Signs








  Date Time  Temp Pulse Resp B/P (MAP) Pulse Ox O2 Delivery O2 Flow Rate FiO2


 


7/30/19 10:08  64  112/59    


 


7/30/19 08:00 98.2  18  95   


 


7/26/19 16:55      Room Air  














I&O- Last 24 Hours up to 6 AM 


 


 7/30/19





 06:00


 


Intake Total 1200 ml


 


Output Total 500 ml


 


Balance 700 ml











Laboratory Data


24H LABS


Laboratory Tests 2


7/29/19 15:38: Lipase 74


7/29/19 16:26: Bedside Glucose (Misc Panel) 108


7/29/19 21:11: Bedside Glucose (Misc Panel) 158H


7/30/19 05:30: 


Nucleated Red Blood Cells % (auto) 0.0, Anion Gap 6L, Glomerular Filtration Rate

> 60.0, Blood Urea Nitrogen 34H, Creatinine 0.91, Sodium Level 141, Potassium 

Level 3.4L, Chloride Level 106, Carbon Dioxide Level 29, Calcium Level 8.4L, 

Aspartate Amino Transf (AST/SGOT) 216H, Alanine Aminotransferase (ALT/SGPT) 

744H, Alkaline Phosphatase 326H, Total Bilirubin 3.2H, Total Protein 6.6, 

Albumin 2.6L, Albumin/Globulin Ratio 0.65L


7/30/19 11:47: Bedside Glucose (Misc Panel) 121H


CBC/BMP


Laboratory Tests


7/30/19 05:30








Red Blood Count 4.44, Mean Corpuscular Volume 86.3, Mean Corpuscular Hemoglobin 

28.2, Mean Corpuscular Hemoglobin Concent 32.6, Red Cell Distribution Width 

14.0, Calcium Level 8.4 L, Aspartate Amino Transf (AST/SGOT) 216 H, Alanine 

Aminotransferase (ALT/SGPT) 744 H, Alkaline Phosphatase 326 H, Total Bilirubin 

3.2 H, Total Protein 6.6, Albumin 2.6 L











BRANDO MCCARTHY MD                Jul 30, 2019 13:35

## 2019-07-31 VITALS — DIASTOLIC BLOOD PRESSURE: 59 MMHG | SYSTOLIC BLOOD PRESSURE: 127 MMHG

## 2019-07-31 VITALS — DIASTOLIC BLOOD PRESSURE: 65 MMHG | SYSTOLIC BLOOD PRESSURE: 130 MMHG

## 2019-07-31 VITALS — SYSTOLIC BLOOD PRESSURE: 122 MMHG | DIASTOLIC BLOOD PRESSURE: 63 MMHG

## 2019-07-31 VITALS — SYSTOLIC BLOOD PRESSURE: 127 MMHG | DIASTOLIC BLOOD PRESSURE: 59 MMHG

## 2019-07-31 LAB
ALBUMIN SERPL BCG-MCNC: 2.5 GM/DL (ref 3.2–5.2)
ALT SERPL W P-5'-P-CCNC: 509 U/L (ref 12–78)
BASOPHILS # BLD AUTO: 0 10^3/UL (ref 0–0.2)
BASOPHILS NFR BLD AUTO: 0.4 % (ref 0–1)
BILIRUB SERPL-MCNC: 1.8 MG/DL (ref 0.2–1)
BUN SERPL-MCNC: 19 MG/DL (ref 7–18)
CALCIUM SERPL-MCNC: 7.5 MG/DL (ref 8.8–10.2)
CHLORIDE SERPL-SCNC: 106 MEQ/L (ref 98–107)
CO2 SERPL-SCNC: 29 MEQ/L (ref 21–32)
CREAT SERPL-MCNC: 0.7 MG/DL (ref 0.7–1.3)
EOSINOPHIL # BLD AUTO: 0.3 10^3/UL (ref 0–0.5)
EOSINOPHIL NFR BLD AUTO: 6.8 % (ref 0–3)
EST. AVERAGE GLUCOSE BLD GHB EST-MCNC: 134 MG/DL (ref 60–110)
GFR SERPL CREATININE-BSD FRML MDRD: > 60 ML/MIN/{1.73_M2} (ref 42–?)
GLUCOSE SERPL-MCNC: 110 MG/DL (ref 70–100)
HCT VFR BLD AUTO: 36.5 % (ref 42–52)
HGB BLD-MCNC: 11.9 G/DL (ref 13.5–17.5)
LYMPHOCYTES # BLD AUTO: 0.8 10^3/UL (ref 1.5–4.5)
LYMPHOCYTES NFR BLD AUTO: 15.3 % (ref 24–44)
MCH RBC QN AUTO: 27.2 PG (ref 27–33)
MCHC RBC AUTO-ENTMCNC: 32.6 G/DL (ref 32–36.5)
MCV RBC AUTO: 83.3 FL (ref 80–96)
MONOCYTES # BLD AUTO: 0.6 10^3/UL (ref 0–0.8)
MONOCYTES NFR BLD AUTO: 11.8 % (ref 0–5)
NEUTROPHILS # BLD AUTO: 3.2 10^3/UL (ref 1.8–7.7)
NEUTROPHILS NFR BLD AUTO: 64.3 % (ref 36–66)
PLATELET # BLD AUTO: 132 10^3/UL (ref 150–450)
POTASSIUM SERPL-SCNC: 3.3 MEQ/L (ref 3.5–5.1)
PROT SERPL-MCNC: 5.8 GM/DL (ref 6.4–8.2)
RBC # BLD AUTO: 4.38 10^6/UL (ref 4.3–6.1)
SODIUM SERPL-SCNC: 139 MEQ/L (ref 136–145)
T3FREE SERPL-MCNC: 2 PG/ML (ref 2.2–4)
T4 FREE SERPL-MCNC: 1.39 NG/DL (ref 0.76–1.46)
WBC # BLD AUTO: 5 10^3/UL (ref 4–10)

## 2019-07-31 RX ADMIN — DEXTROSE MONOHYDRATE SCH MG: 50 INJECTION, SOLUTION INTRAVENOUS at 22:12

## 2019-07-31 RX ADMIN — DOCUSATE SODIUM SCH MG: 100 CAPSULE, LIQUID FILLED ORAL at 22:11

## 2019-07-31 RX ADMIN — INSULIN LISPRO SCH UNITS: 100 INJECTION, SOLUTION INTRAVENOUS; SUBCUTANEOUS at 07:30

## 2019-07-31 RX ADMIN — SUCRALFATE SCH GM: 1 TABLET ORAL at 22:11

## 2019-07-31 RX ADMIN — INSULIN LISPRO SCH UNITS: 100 INJECTION, SOLUTION INTRAVENOUS; SUBCUTANEOUS at 21:00

## 2019-07-31 RX ADMIN — MULTIPLE VITAMINS W/ MINERALS TAB SCH TAB: TAB at 09:08

## 2019-07-31 RX ADMIN — INSULIN LISPRO SCH UNITS: 100 INJECTION, SOLUTION INTRAVENOUS; SUBCUTANEOUS at 12:00

## 2019-07-31 RX ADMIN — SUCRALFATE SCH GM: 1 TABLET ORAL at 12:00

## 2019-07-31 RX ADMIN — MEMANTINE SCH MG: 5 TABLET ORAL at 22:11

## 2019-07-31 RX ADMIN — SODIUM CHLORIDE SCH MLS/HR: 9 INJECTION, SOLUTION INTRAVENOUS at 23:48

## 2019-07-31 RX ADMIN — INSULIN LISPRO SCH UNITS: 100 INJECTION, SOLUTION INTRAVENOUS; SUBCUTANEOUS at 17:20

## 2019-07-31 RX ADMIN — MEMANTINE SCH MG: 5 TABLET ORAL at 09:08

## 2019-07-31 RX ADMIN — SODIUM CHLORIDE, PRESERVATIVE FREE SCH ML: 5 INJECTION INTRAVENOUS at 22:12

## 2019-07-31 RX ADMIN — DOCUSATE SODIUM SCH MG: 100 CAPSULE, LIQUID FILLED ORAL at 09:06

## 2019-07-31 RX ADMIN — DEXTROSE MONOHYDRATE SCH MG: 50 INJECTION, SOLUTION INTRAVENOUS at 09:08

## 2019-07-31 RX ADMIN — SUCRALFATE SCH GM: 1 TABLET ORAL at 07:30

## 2019-07-31 RX ADMIN — SUCRALFATE SCH GM: 1 TABLET ORAL at 17:20

## 2019-07-31 RX ADMIN — ASPIRIN SCH MG: 81 TABLET ORAL at 09:08

## 2019-07-31 RX ADMIN — SODIUM CHLORIDE SCH MLS/HR: 9 INJECTION, SOLUTION INTRAVENOUS at 08:48

## 2019-07-31 RX ADMIN — SIMVASTATIN SCH MG: 20 TABLET, FILM COATED ORAL at 22:12

## 2019-07-31 RX ADMIN — SODIUM CHLORIDE, PRESERVATIVE FREE SCH ML: 5 INJECTION INTRAVENOUS at 14:00

## 2019-07-31 NOTE — REP
HEPATOBILIARY SCAN:

 

HISTORY:  Rule out biliary dyskinesia.

 

TECHNIQUE:  6.6 mCi of technetium 99m mebrofenin is injected, and sequential

anterior abdominal images are acquired.  Initial sequential imaging for 60

minutes as followed by a 3-hour delayed scan.

 

SCINTIGRAPHIC FINDINGS:  The initial hepatocellular parenchymal uptake phase is

normal and homogeneous.  The intrahepatic bile ducts and extrahepatic bile ducts

are labeled at 15 minutes.  The duodenum is labeled at 20 minutes, and subsequent

scans demonstrate washout from the liver parenchyma into the small intestine.

The gallbladder does not visualize during the first 60 minutes of imaging.

However, delayed scanning at 3 hours demonstrates gallbladder visualization.

 

IMPRESSION:

 

Delayed visualization of the gallbladder consistent with chronic cholecystitis.

 

 

Electronically Signed by

Yaakov Rutherford MD 07/31/2019 02:24 P

## 2019-07-31 NOTE — IPNPDOC
Subjective


Date Seen


The patient was seen on 7/31/19.


Time of service 3:20 PM





Subjective


Chief Complaint/HPI


nausea and vomiting


Events since last encounter


The patient doesn't have any acute c/o this morning. He went for a HIDA scan 

which showed delayed emptying c/w chronic cholecystitis.





Objective


Physical Examination


Other physical findings


VITALS: Vitals see below


GENERAL: Well-nourished, well-developed, asleep but arousable with vocal


HEENT: Normocephalic, atraumatic.


CHEST: Regular rate and rhythm, no murmurs, rubs or gallops, radial pulse 

intact.


LUNGS: Clear to auscultation bilaterally on room air.


Abdomen: Positive Bowel Sounds, Abdomen Is Distended, but Soft, but Nontender on

Palpation.


NEURO: Cranial Nerves II through XII Grossly Intact, Speech, Not Dysarthric.


PSYCH: Alert and oriented to person, able to understand and follow commands





Assessment /Plan


Assessment


Mr. Mayfield is a 79-year-old male with a past medical history of bilateral 

pulmonary nodules, subarachnoid cyst, status post right partial craniectomy with

cystoscopy peritoneal shunt, chronic hypertension, dyslipidemia, diabetes 

mellitus, and dementia. He was admitted for evaluation of nausea and vomiting 

which is now resolved. The patient continues to have transaminitis whose causes 

to be determined.





1. Nausea and vomiting - resolving


Possibly due to cholecystitis versus gastroenteritis.


The first CT of abdomen and pelvis showed findings consistent with mesenteric 

edema and partial SBO.


The second CT of the abdomen and pelvis showed ascites and gallbladder wall 

thickening and resolution of the partial SBO





Plan: Advance to regular diet, follow-up on Anisa Aranda and Cynthia's consults





2. Transaminitis


Appears jaundiced according to his daughter


Not sure if this versus hepatic versus cholestatic.


Unable to get MRCP because of  shunt.


Ultrasound of the liver showed tumefactive sludge. 


HIDA scan showed findings consistent with chronic cholecystitis. 


Plan: Trend LFTs / per d/w Dr.Gosselin (Gen Surgery) he can f/u with him on an o

ut patient basis / appreciate GI and Gen Surg's recs





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


HETAL has resolved. 


ACEI and Metformin were held


Plan: follow-up BMP daily 





4. Bilateral pulmonary nodules.


The nodule in the right middle lobe was 1.3 cm in size and may be post 

inflammatory or metastatic.


Since this mass more than 1 cm in size the will may need a biopsy


Plan: Repeat CT on an outpatient basis





5. Low TSH.


May be secondary to euthyroid sick syndrome versus hypothyroidism.


Plan follow-up today free T3 and T4.





6 History of subarachnoid cyst.


Status post right partial craniectomy with CVP shunt.


Recent CT of the head done was unremarkable


Plan: Follow-up with neurologist on an outpatient basis





7 Chronic hypertension.


Plan: Continue with Norvasc/ resume Ace inhibitor





8. Dyslipidemia.


Plan continue statin





9. Diabetes mellitus.


A1c 6.3%


Plan follow-up Accu-Cheks, discontinue sliding scale insulin, patient may not 

need metformin (his target A1C at his age and w a limited life expectance is 8-

8.5%) he can follow up on this with his PCP





10. Dementia.


Plan: Hold donepezil as it can cause GI disturbance, continue with memantine





DVT prophylaxis with Lovenox





Disposition home tomorrow if he tolerates a full diet and his LFTs continue to 

trend downwards





Plan/VTE


VTE Prophylaxis Ordered?:  Yes





VS, I&O, 24H, [facility]


Vital Signs/I&O





Vital Signs








  Date Time  Temp Pulse Resp B/P (MAP) Pulse Ox O2 Delivery O2 Flow Rate FiO2


 


7/31/19 09:07  59  127/59    


 


7/31/19 08:00 98.0  17  99   


 


7/26/19 16:55      Room Air  














I&O- Last 24 Hours up to 6 AM 


 


 7/31/19





 06:00


 


Intake Total 3420 ml


 


Output Total 750 ml


 


Balance 2670 ml











Laboratory Data


24H LABS


Laboratory Tests 2


7/30/19 21:22: Bedside Glucose (Misc Panel) 160H


7/31/19 05:20: 


Immature Granulocyte % (Auto) 1.4, White Blood Count 5.0, Red Blood Count 4.38, 

Hemoglobin 11.9L, Hematocrit 36.5L, Mean Corpuscular Volume 83.3, Mean 

Corpuscular Hemoglobin 27.2, Mean Corpuscular Hemoglobin Concent 32.6, Red Cell 

Distribution Width 13.8, Platelet Count 132L, Neutrophils (%) (Auto) 64.3, 

Lymphocytes (%) (Auto) 15.3L, Monocytes (%) (Auto) 11.8H, Eosinophils (%) (Auto)

6.8H, Basophils (%) (Auto) 0.4, Neutrophils # (Auto) 3.2, Lymphocytes # (Auto) 

0.8L, Monocytes # (Auto) 0.6, Eosinophils # (Auto) 0.3, Basophils # (Auto) 0.0, 

Nucleated Red Blood Cells % (auto) 0.0, Anion Gap 4L, Glomerular Filtration Rate

> 60.0, Estimated Mean Plasma Glucose 134H, Hemoglobin A1c 6.3, Blood Urea 

Nitrogen 19H, Creatinine 0.70, Sodium Level 139, Potassium Level 3.3L, Chloride 

Level 106, Carbon Dioxide Level 29, Calcium Level 7.5L, Aspartate Amino Transf 

(AST/SGOT) 90H, Alanine Aminotransferase (ALT/SGPT) 509H, Alkaline Phosphatase 

306H, Total Bilirubin 1.8H, Total Protein 5.8L, Albumin 2.5L, Albumin/Globulin 

Ratio 0.76L, Free Thyroxine 1.39, Free Triiodothyronine 2.0L


7/31/19 12:19: Bedside Glucose (Misc Panel) 115H


CBC/BMP


Laboratory Tests


7/31/19 05:20








Red Blood Count 4.38, Mean Corpuscular Volume 83.3, Mean Corpuscular Hemoglobin 

27.2, Mean Corpuscular Hemoglobin Concent 32.6, Red Cell Distribution Width 13

.8, Neutrophils (%) (Auto) 64.3, Lymphocytes (%) (Auto) 15.3 L, Monocytes (%) 

(Auto) 11.8 H, Eosinophils (%) (Auto) 6.8 H, Basophils (%) (Auto) 0.4, 

Neutrophils # (Auto) 3.2, Lymphocytes # (Auto) 0.8 L, Monocytes # (Auto) 0.6, 

Eosinophils # (Auto) 0.3, Basophils # (Auto) 0.0, Calcium Level 7.5 L, Aspartate

Amino Transf (AST/SGOT) 90 H, Alanine Aminotransferase (ALT/SGPT) 509 H, 

Alkaline Phosphatase 306 H, Total Bilirubin 1.8 H, Total Protein 5.8 L, Albumin 

2.5 L











BRANDO MCCARTHY MD                Jul 31, 2019 16:58

## 2019-08-01 VITALS — SYSTOLIC BLOOD PRESSURE: 127 MMHG | DIASTOLIC BLOOD PRESSURE: 67 MMHG

## 2019-08-01 LAB
ALBUMIN SERPL BCG-MCNC: 2.6 GM/DL (ref 3.2–5.2)
ALT SERPL W P-5'-P-CCNC: 366 U/L (ref 12–78)
BASOPHILS # BLD AUTO: 0 10^3/UL (ref 0–0.2)
BASOPHILS NFR BLD AUTO: 0.5 % (ref 0–1)
BILIRUB SERPL-MCNC: 1.3 MG/DL (ref 0.2–1)
BUN SERPL-MCNC: 13 MG/DL (ref 7–18)
CALCIUM SERPL-MCNC: 8.5 MG/DL (ref 8.8–10.2)
CHLORIDE SERPL-SCNC: 106 MEQ/L (ref 98–107)
CO2 SERPL-SCNC: 28 MEQ/L (ref 21–32)
CREAT SERPL-MCNC: 0.7 MG/DL (ref 0.7–1.3)
EOSINOPHIL # BLD AUTO: 0.5 10^3/UL (ref 0–0.5)
EOSINOPHIL NFR BLD AUTO: 7.9 % (ref 0–3)
GFR SERPL CREATININE-BSD FRML MDRD: > 60 ML/MIN/{1.73_M2} (ref 42–?)
GLUCOSE SERPL-MCNC: 99 MG/DL (ref 70–100)
HCT VFR BLD AUTO: 38.2 % (ref 42–52)
HGB BLD-MCNC: 12.4 G/DL (ref 13.5–17.5)
LYMPHOCYTES # BLD AUTO: 1.3 10^3/UL (ref 1.5–4.5)
LYMPHOCYTES NFR BLD AUTO: 22.6 % (ref 24–44)
MCH RBC QN AUTO: 27.1 PG (ref 27–33)
MCHC RBC AUTO-ENTMCNC: 32.5 G/DL (ref 32–36.5)
MCV RBC AUTO: 83.4 FL (ref 80–96)
MONOCYTES # BLD AUTO: 0.7 10^3/UL (ref 0–0.8)
MONOCYTES NFR BLD AUTO: 12.3 % (ref 0–5)
NEUTROPHILS # BLD AUTO: 3.2 10^3/UL (ref 1.8–7.7)
NEUTROPHILS NFR BLD AUTO: 55.3 % (ref 36–66)
PLATELET # BLD AUTO: 163 10^3/UL (ref 150–450)
POTASSIUM SERPL-SCNC: 3.4 MEQ/L (ref 3.5–5.1)
PROT SERPL-MCNC: 6.6 GM/DL (ref 6.4–8.2)
RBC # BLD AUTO: 4.58 10^6/UL (ref 4.3–6.1)
SODIUM SERPL-SCNC: 140 MEQ/L (ref 136–145)
WBC # BLD AUTO: 5.7 10^3/UL (ref 4–10)

## 2019-08-01 RX ADMIN — MULTIPLE VITAMINS W/ MINERALS TAB SCH TAB: TAB at 08:18

## 2019-08-01 RX ADMIN — DEXTROSE MONOHYDRATE SCH MG: 50 INJECTION, SOLUTION INTRAVENOUS at 08:18

## 2019-08-01 RX ADMIN — SODIUM CHLORIDE, PRESERVATIVE FREE SCH ML: 5 INJECTION INTRAVENOUS at 05:42

## 2019-08-01 RX ADMIN — INSULIN LISPRO SCH UNITS: 100 INJECTION, SOLUTION INTRAVENOUS; SUBCUTANEOUS at 07:30

## 2019-08-01 RX ADMIN — MEMANTINE SCH MG: 5 TABLET ORAL at 08:17

## 2019-08-01 RX ADMIN — SUCRALFATE SCH GM: 1 TABLET ORAL at 08:18

## 2019-08-01 RX ADMIN — ASPIRIN SCH MG: 81 TABLET ORAL at 08:17

## 2019-08-01 RX ADMIN — DOCUSATE SODIUM SCH MG: 100 CAPSULE, LIQUID FILLED ORAL at 08:18

## 2019-08-01 NOTE — CR
DATE OF CONSULTATION:  07/30/2019

 

REASON FOR CONSULTATION:  Abnormal liver function tests and gallstones.

 

BRIEF HISTORY OF PRESENT ILLNESS:  The patient is an elderly male with some

Alzheimer who had presented with nausea and vomiting.  Did not complain of any

abdominal pain.  Did not complain of any right upper quadrant pain.  Did not have

any history of fatty food intolerance.  Has not had any previous

gallbladder/cholecystitis in the past.  Has had a craniotomy with a peritoneal

shunt placed in the past and ended up having a CAT scan on admission that showed

some mild dilated small bowel and fluid in the abdomen.  The peritoneal shunt did

not actually see mention of that on their study.  However, obviously with the

fluid in the abdomen this was noted as well.  The patient had some abnormal liver

function tests on admission and because of these abnormal liver function tests

had some followup studies the next few days.  The bilirubin bumped up to 5.1 on

07/29/2019 and has been slowly decreasing.  However, the aspartate

aminotransferase (AST) and alanine transaminase (ALT) have been in the 100s.

Alkaline phosphatase has been very high as well.  No evidence of cholecystitis

was appreciated on gallbladder ultrasound.  However, some gallstones were

appreciated.  No gallbladder wall thickening and normal common bile duct.  The

patient does not complain of any abdominal pain at this time and I was asked to

see the patient for possible cholecystitis causing this abnormal liver function

tests.

 

PAST MEDICAL HISTORY:  Significant for history of Alzheimer disease, history of

hypertension, history of dyslipidemia, history of diabetes mellitus, history of

subarachnoid cyst, history of chronic kidney disease, history of peritoneal

shunt.

 

MEDICATIONS (include):

- aspirin

- Colace

- donepezil

- lisinopril

- melatonin

- metformin

- Thera M Plus

- olopatadine

- pantoprazole

- simvastatin

 

PHYSICAL EXAMINATION:

Reveals an elderly male who looks stated age.

HEENT is unremarkable.  He does have some mild scleral icterus.

Neck:  Supple without adenopathy.

Lungs are clear to auscultation.

Heart is regular.

Abdomen is soft, nontender.

 

IMPRESSION/PLAN:  At  this point, I am not seeing evidence of cholecystitis

causing his major issue and some ileus that he has as suggested on the possible

first CAT scan I am not really appreciating that as well.  I would recommend

progressing his diet as tolerated.  However, more importantly with his liver

function test abnormality I would recommend GI to evaluate him and make

recommendations appropriate as deemed necessary.  It seems unlikely that he has a

common bile duct stone that has passed given the significantly elevated AST and

ALT out of proportion to what it would typically anticipate a common bile duct

stone obstruction and thus, given this finding if you have ongoing concerns of

common bile duct obstruction an MRC might be reasonable.  Once again, no evidence

of acute cholecystitis.

## 2019-08-01 NOTE — IPN
DATE:  07/31/2019

 

The patient essentially ended up having a HIDA scan this morning to rule out

acute cholecystitis.  No evidence of the acute cholecystitis was appreciated.

However, the gallbladder did not really empty very well, slowly and may consider

this as a possible chronic cholecystitis, although easily could be a normal

finding in somebody who is n.p.o. and had poor diet for several days.  In any

case, his abdomen is still benign and nontender.  He does not have any right

upper quadrant pain and tenderness.  His LFTs are still elevated but they are

coming down nicely and his white count has been normal.  All suggesting that this

is not an evident episode of cholecystitis.  What the etiology is as of yet I am

not sure.  Given the HIDA showed evidence of flow into the duodenum, it is

unlikely that he has an obstructive common bile duct stone.

 

Awaiting GI recommendations and consultation concerning this issue.  Otherwise no

surgical intervention is necessary.  Overall proceeding with a lap

cholecystectomy with him in the future will be somewhat more complicated given

his of peritoneal shunt.  Will have to discuss this as an outpatient at a later

date.  However, I would recommend continuing to progress his diet as tolerated

given that there is no surgical intervention necessary.

## 2019-08-08 NOTE — DS.PDOC
Discharge Summary


General


Date of Admission


Jul 26, 2019 at 15:53


Date of Discharge


8.1.19





Discharge Summary


PROCEDURES PERFORMED DURING STAY: [None].





ADMITTING DIAGNOSES: 


1. vomiting


2. dehydration





DISCHARGE DIAGNOSES:


1. gastroenteritis


2. dehydration


3. transaminitis





COMPLICATIONS/CHIEF COMPLAINT: Volume Depletion.





HISTORY OF PRESENT ILLNESS: 79-year-old male with past medical history of 

hypertension, dyslipidemia, diabetes, subarachnoid cyst, Alzheimer's dementia, 

and chronic kidney disease presented to the ER with a chief complaint of nausea 

with vomiting. The patient's history is limited secondary to his underlying 

dementia. However, according to the patient's son who is at the bedside, he went

 to dinner with his dad yesterday evening when he was last well. This morning, 

the patient apparently woke up and had significant nausea and had about 10 

episodes of brown-colored emesis. There was no fevers, chills, abdominal pain, 

or diarrhea noted. The patient's son states that he did not have any illness, 

but ate different food in his father who had roast beef.





In the ER, the patient was noted to be dehydrated and had a borderline low blood

 pressure. The patient's lactic acid level was also noted to be elevated at 4.9.

 He will be admitted to the hospitalist service for further evaluation and 

management.





HOSPITAL COURSE: Pt was admitted to the hospitalist service. He was hydrated and

 treated with antiemetics. Ct showed evidence of possible ileus. His bp and 

vomiting improved. However his lfts became markedly elevated. He was evaluated 

by surgery and gi. he was screened for possible causes of hepatitis with no 

obvious abnormalities. He also had a hida scan which showed delayed filling of 

GB. This was felt to be due to his prolonged npo status and not chronic 

cholecystitis. He had lung nodules seen on ct which he is to follow up with his 

pmd for.  





DISCHARGE MEDICATIONS: Please see below.


 


ALLERGIES: Please see below.





PHYSICAL EXAMINATION ON DISCHARGE:


VITAL SIGNS: Please see below.


GENERAL: Well-nourished, well-developed, asleep but arousable with vocal


HEENT: Normocephalic, atraumatic.


CHEST: Regular rate and rhythm, no murmurs, rubs or gallops, radial pulse 

intact.


LUNGS: Clear to auscultation bilaterally on room air.


Abdomen: Positive Bowel Sounds, Abdomen Is Distended, but Soft, but Nontender on

 Palpation.


NEURO: Cranial Nerves II through XII Grossly Intact, Speech, Not Dysarthric.


PSYCH: Alert and oriented to person, able to understand and follow commands





LABORATORY DATA: Please see below.











ACTIVITY: [As tolerated].





DIET: diabetic








DISPOSITION: [facility].











DISCHARGE CONDITION: [Stable].





Discharge Medications


Scheduled


Aspirin (Aspirin EC) 81 Mg Tab, 81 MG PO DAILY, (Reported)


Docusate Sodium (Colace) 100 Mg Cap, 100 MG PO BID, (Reported)


Lisinopril (Lisinopril) 40 Mg Tab, 20 MG PO DAILY, (Reported)


Melatonin (Melatonin) 3 Mg Tablet, 3 MG PO QHS, (Reported)


Memantine HCl (Namenda Xr) 28 Mg Cap, 28 MG PO DAILY, (Reported)


Multivitamins (Thera M Plus Tablet) 1 Tab Tab, 1 TAB PO DAILY, (Reported)


Olopatadine HCl (Olopatadine HCl) 0.2% 2.5ML Drops, 1 DROP OU BID, (Reported)


Pantoprazole Sodium (Pantoprazole Sodium) 40 Mg Tab, 40 MG PO BID


Simvastatin (Simvastatin) 20 Mg Tablet, 20 MG PO QHS, (Reported)


Sucralfate (Sucralfate) 1 Gm Tablet, 1 GM PO ACHS





Scheduled PRN


Polyvinyl Alcohol (Artificial Tears) 15 Ml Drops, 1 DROP OU QID PRN for DRY 

EYES, (Reported)





Allergies


Coded Allergies:  


     No Known Allergies (Unverified , 7/26/19)











THONG MOYER MD           Aug 8, 2019 00:12

## 2019-08-15 ENCOUNTER — HOSPITAL ENCOUNTER (OUTPATIENT)
Dept: HOSPITAL 53 - M LAB REF | Age: 79
End: 2019-08-15
Attending: FAMILY MEDICINE
Payer: MEDICARE

## 2019-08-15 DIAGNOSIS — R74.8: Primary | ICD-10-CM

## 2019-09-06 ENCOUNTER — HOSPITAL ENCOUNTER (OUTPATIENT)
Dept: HOSPITAL 53 - M LAB REF | Age: 79
End: 2019-09-06
Attending: FAMILY MEDICINE
Payer: MEDICARE

## 2019-09-06 DIAGNOSIS — E07.9: Primary | ICD-10-CM

## 2019-10-16 ENCOUNTER — HOSPITAL ENCOUNTER (EMERGENCY)
Dept: HOSPITAL 53 - M ED | Age: 79
Discharge: HOME | End: 2019-10-16
Payer: MEDICARE

## 2019-10-16 VITALS — HEIGHT: 65 IN | BODY MASS INDEX: 28.38 KG/M2 | WEIGHT: 170.35 LBS

## 2019-10-16 VITALS — DIASTOLIC BLOOD PRESSURE: 53 MMHG | SYSTOLIC BLOOD PRESSURE: 97 MMHG

## 2019-10-16 DIAGNOSIS — Z87.891: ICD-10-CM

## 2019-10-16 DIAGNOSIS — N18.3: ICD-10-CM

## 2019-10-16 DIAGNOSIS — Z79.82: ICD-10-CM

## 2019-10-16 DIAGNOSIS — K52.9: Primary | ICD-10-CM

## 2019-10-16 DIAGNOSIS — Z79.899: ICD-10-CM

## 2019-10-16 DIAGNOSIS — E11.9: ICD-10-CM

## 2019-10-16 DIAGNOSIS — I12.9: ICD-10-CM

## 2019-10-16 LAB
ALBUMIN SERPL BCG-MCNC: 3.9 GM/DL (ref 3.2–5.2)
ALT SERPL W P-5'-P-CCNC: 35 U/L (ref 12–78)
AMYLASE SERPL-CCNC: 63 U/L (ref 25–115)
BASOPHILS # BLD AUTO: 0.1 10^3/UL (ref 0–0.2)
BASOPHILS NFR BLD AUTO: 0.6 % (ref 0–1)
BILIRUB CONJ SERPL-MCNC: 0.2 MG/DL (ref 0–0.2)
BILIRUB SERPL-MCNC: 0.8 MG/DL (ref 0.2–1)
BUN SERPL-MCNC: 34 MG/DL (ref 7–18)
CALCIUM SERPL-MCNC: 9.3 MG/DL (ref 8.8–10.2)
CHLORIDE SERPL-SCNC: 104 MEQ/L (ref 98–107)
CO2 SERPL-SCNC: 27 MEQ/L (ref 21–32)
CREAT SERPL-MCNC: 1.17 MG/DL (ref 0.7–1.3)
EOSINOPHIL # BLD AUTO: 0 10^3/UL (ref 0–0.5)
EOSINOPHIL NFR BLD AUTO: 0.5 % (ref 0–3)
GFR SERPL CREATININE-BSD FRML MDRD: > 60 ML/MIN/{1.73_M2} (ref 42–?)
GLUCOSE SERPL-MCNC: 137 MG/DL (ref 70–100)
HCT VFR BLD AUTO: 50.9 % (ref 42–52)
HGB BLD-MCNC: 16.7 G/DL (ref 13.5–17.5)
LIPASE SERPL-CCNC: 110 U/L (ref 73–393)
LYMPHOCYTES # BLD AUTO: 0.5 10^3/UL (ref 1.5–5)
LYMPHOCYTES NFR BLD AUTO: 5.8 % (ref 24–44)
MCH RBC QN AUTO: 28.3 PG (ref 27–33)
MCHC RBC AUTO-ENTMCNC: 32.8 G/DL (ref 32–36.5)
MCV RBC AUTO: 86.1 FL (ref 80–96)
MONOCYTES # BLD AUTO: 0.9 10^3/UL (ref 0–0.8)
MONOCYTES NFR BLD AUTO: 10.5 % (ref 0–5)
NEUTROPHILS # BLD AUTO: 7.3 10^3/UL (ref 1.5–8.5)
NEUTROPHILS NFR BLD AUTO: 82.4 % (ref 36–66)
PLATELET # BLD AUTO: 209 10^3/UL (ref 150–450)
POTASSIUM SERPL-SCNC: 4.8 MEQ/L (ref 3.5–5.1)
PROT SERPL-MCNC: 7.4 GM/DL (ref 6.4–8.2)
RBC # BLD AUTO: 5.91 10^6/UL (ref 4.3–6.1)
SODIUM SERPL-SCNC: 138 MEQ/L (ref 136–145)
WBC # BLD AUTO: 8.8 10^3/UL (ref 4–10)

## 2019-10-16 PROCEDURE — 99285 EMERGENCY DEPT VISIT HI MDM: CPT

## 2019-10-16 PROCEDURE — 96361 HYDRATE IV INFUSION ADD-ON: CPT

## 2019-10-16 PROCEDURE — 75809 NONVASCULAR SHUNT X-RAY: CPT

## 2019-10-16 PROCEDURE — 70450 CT HEAD/BRAIN W/O DYE: CPT

## 2019-10-16 PROCEDURE — 85025 COMPLETE CBC W/AUTO DIFF WBC: CPT

## 2019-10-16 PROCEDURE — 80048 BASIC METABOLIC PNL TOTAL CA: CPT

## 2019-10-16 PROCEDURE — 83690 ASSAY OF LIPASE: CPT

## 2019-10-16 PROCEDURE — 82150 ASSAY OF AMYLASE: CPT

## 2019-10-16 PROCEDURE — 96374 THER/PROPH/DIAG INJ IV PUSH: CPT

## 2019-10-16 PROCEDURE — 93041 RHYTHM ECG TRACING: CPT

## 2019-10-16 PROCEDURE — 80076 HEPATIC FUNCTION PANEL: CPT

## 2019-10-16 NOTE — REP
CT brain:  10/16/2019.

 

Indication:  Hydrocephalus.  Emesis.

 

Comparison:  07/26/2019.

 

Technique:  Unenhanced axial CT images of the brain were obtained from skull base

to vertex.

 

Findings:

 

There is no acute intracranial hemorrhage or acute cortical infarction.  Volume

loss is present.  Ventricles remain stable in size and configuration.  There is

encephalomalacia within the high posterior right frontal lobe.  Right

frontoparietal craniotomy is noted.  The placement of the catheter is unchanged.

Patchy areas of white matter hypoattenuation are present most consistent with

sequelae of chronic small vessel disease.

 

Fraction:

 

Stable examination compared to earlier this year.  Mild ventriculomegaly is

unchanged.  Postoperative sequelae and shunt catheter are unchanged.

 

 

Electronically Signed by

Mynor Sotomayor DO 10/16/2019 02:15 P

## 2019-10-16 NOTE — REP
Four views.  The patient:  10/16/2019.

 

Indication:  Hydrocephalus.  Emesis.

 

Comparison:  07/26/2019.

 

Findings:

 

The shunt is clearly visualized throughout and shows complete integrity.  There

is no kinking or additional abnormalities that may explain obstructing flow.  The

configuration and appearance of the shunt is similar to earlier this year.

 

Impression:

 

The integrity of the ventriculoperitoneal shunt is maintained.

 

 

Electronically Signed by

Mynor Sotomayor DO 10/16/2019 01:58 P

## 2019-12-04 ENCOUNTER — HOSPITAL ENCOUNTER (OUTPATIENT)
Dept: HOSPITAL 53 - M LAB REF | Age: 79
End: 2019-12-04
Attending: FAMILY MEDICINE
Payer: MEDICARE

## 2019-12-04 DIAGNOSIS — R30.0: Primary | ICD-10-CM

## 2019-12-04 LAB
APPEARANCE UR: CLEAR
BACTERIA UR QL AUTO: NEGATIVE
BILIRUB UR QL STRIP.AUTO: NEGATIVE
GLUCOSE UR QL STRIP.AUTO: NEGATIVE MG/DL
HGB UR QL STRIP.AUTO: NEGATIVE
KETONES UR QL STRIP.AUTO: NEGATIVE MG/DL
LEUKOCYTE ESTERASE UR QL STRIP.AUTO: NEGATIVE
MUCOUS THREADS URNS QL MICRO: (no result)
NITRITE UR QL STRIP.AUTO: NEGATIVE
PH UR STRIP.AUTO: 5 UNITS (ref 5–9)
PROT UR QL STRIP.AUTO: (no result) MG/DL
RBC # UR AUTO: 3 /HPF (ref 0–3)
SP GR UR STRIP.AUTO: 1.02 (ref 1–1.03)
SQUAMOUS #/AREA URNS AUTO: 0 /HPF (ref 0–6)
UROBILINOGEN UR QL STRIP.AUTO: 0.2 MG/DL (ref 0–2)
WBC #/AREA URNS AUTO: 1 /HPF (ref 0–3)

## 2019-12-05 ENCOUNTER — HOSPITAL ENCOUNTER (OUTPATIENT)
Dept: HOSPITAL 53 - SSVSNF2 | Age: 79
End: 2019-12-05
Attending: FAMILY MEDICINE
Payer: MEDICARE

## 2019-12-05 DIAGNOSIS — R30.0: Primary | ICD-10-CM

## 2020-07-14 ENCOUNTER — HOSPITAL ENCOUNTER (OUTPATIENT)
Dept: HOSPITAL 53 - SSVALF1 | Age: 80
End: 2020-07-14
Attending: FAMILY MEDICINE
Payer: MEDICARE

## 2020-07-14 DIAGNOSIS — E11.40: ICD-10-CM

## 2020-07-14 DIAGNOSIS — E07.9: Primary | ICD-10-CM

## 2020-07-14 LAB
ALBUMIN SERPL BCG-MCNC: 3.6 GM/DL (ref 3.2–5.2)
ALT SERPL W P-5'-P-CCNC: 34 U/L (ref 12–78)
BASOPHILS # BLD AUTO: 0 10^3/UL (ref 0–0.2)
BASOPHILS NFR BLD AUTO: 0.7 % (ref 0–1)
BILIRUB SERPL-MCNC: 0.6 MG/DL (ref 0.2–1)
BUN SERPL-MCNC: 20 MG/DL (ref 7–18)
CALCIUM SERPL-MCNC: 8.8 MG/DL (ref 8.8–10.2)
CHLORIDE SERPL-SCNC: 103 MEQ/L (ref 98–107)
CO2 SERPL-SCNC: 30 MEQ/L (ref 21–32)
CREAT SERPL-MCNC: 0.84 MG/DL (ref 0.7–1.3)
EOSINOPHIL # BLD AUTO: 0.1 10^3/UL (ref 0–0.5)
EOSINOPHIL NFR BLD AUTO: 2.2 % (ref 0–3)
GFR SERPL CREATININE-BSD FRML MDRD: > 60 ML/MIN/{1.73_M2} (ref 42–?)
GLUCOSE SERPL-MCNC: 85 MG/DL (ref 70–100)
HCT VFR BLD AUTO: 41.5 % (ref 42–52)
HGB BLD-MCNC: 13.3 G/DL (ref 13.5–17.5)
LYMPHOCYTES # BLD AUTO: 1.6 10^3/UL (ref 1.5–5)
LYMPHOCYTES NFR BLD AUTO: 30.1 % (ref 24–44)
MAGNESIUM SERPL-MCNC: 2.2 MG/DL (ref 1.8–2.4)
MCH RBC QN AUTO: 28.2 PG (ref 27–33)
MCHC RBC AUTO-ENTMCNC: 32 G/DL (ref 32–36.5)
MCV RBC AUTO: 88.1 FL (ref 80–96)
MONOCYTES # BLD AUTO: 0.4 10^3/UL (ref 0–0.8)
MONOCYTES NFR BLD AUTO: 8.1 % (ref 0–5)
NEUTROPHILS # BLD AUTO: 3.2 10^3/UL (ref 1.5–8.5)
NEUTROPHILS NFR BLD AUTO: 58.2 % (ref 36–66)
PLATELET # BLD AUTO: 263 10^3/UL (ref 150–450)
POTASSIUM SERPL-SCNC: 4.1 MEQ/L (ref 3.5–5.1)
PROT SERPL-MCNC: 7.1 GM/DL (ref 6.4–8.2)
RBC # BLD AUTO: 4.71 10^6/UL (ref 4.3–6.1)
SODIUM SERPL-SCNC: 140 MEQ/L (ref 136–145)
T4 FREE SERPL-MCNC: 1.05 NG/DL (ref 0.76–1.46)
T4 SERPL-MCNC: 6.9 UG/DL (ref 4.5–12)
WBC # BLD AUTO: 5.4 10^3/UL (ref 4–10)

## 2020-07-16 ENCOUNTER — HOSPITAL ENCOUNTER (OUTPATIENT)
Dept: HOSPITAL 53 - M LAB REF | Age: 80
End: 2020-07-16
Attending: FAMILY MEDICINE
Payer: MEDICARE

## 2020-07-16 DIAGNOSIS — D64.9: Primary | ICD-10-CM

## 2020-07-16 LAB
FERRITIN SERPL-MCNC: 104 NG/ML (ref 26–388)
FOLATE SERPL-MCNC: > 24 NG/ML
VIT B12 SERPL-MCNC: 438 PG/ML

## 2020-07-23 ENCOUNTER — HOSPITAL ENCOUNTER (OUTPATIENT)
Dept: HOSPITAL 53 - M LABSMTC | Age: 80
End: 2020-07-23
Attending: FAMILY MEDICINE
Payer: MEDICARE

## 2020-07-23 DIAGNOSIS — Z20.828: Primary | ICD-10-CM

## 2020-07-23 DIAGNOSIS — Z11.59: ICD-10-CM

## 2020-10-06 ENCOUNTER — HOSPITAL ENCOUNTER (OUTPATIENT)
Dept: HOSPITAL 53 - SSVALF1 | Age: 80
End: 2020-10-06
Payer: MEDICARE

## 2020-10-06 DIAGNOSIS — Z20.828: Primary | ICD-10-CM

## 2020-10-22 ENCOUNTER — HOSPITAL ENCOUNTER (OUTPATIENT)
Dept: HOSPITAL 53 - SSVSNF4 | Age: 80
End: 2020-10-22
Attending: INTERNAL MEDICINE

## 2020-10-22 DIAGNOSIS — I10: Primary | ICD-10-CM

## 2020-10-22 LAB
BUN SERPL-MCNC: 20 MG/DL (ref 7–18)
CALCIUM SERPL-MCNC: 8.3 MG/DL (ref 8.8–10.2)
CHLORIDE SERPL-SCNC: 105 MEQ/L (ref 98–107)
CO2 SERPL-SCNC: 32 MEQ/L (ref 21–32)
CREAT SERPL-MCNC: 0.77 MG/DL (ref 0.7–1.3)
GFR SERPL CREATININE-BSD FRML MDRD: > 60 ML/MIN/{1.73_M2} (ref 42–?)
GLUCOSE SERPL-MCNC: 87 MG/DL (ref 70–100)
HCT VFR BLD AUTO: 45.8 % (ref 42–52)
HGB BLD-MCNC: 14.5 G/DL (ref 13.5–17.5)
MCH RBC QN AUTO: 27.6 PG (ref 27–33)
MCHC RBC AUTO-ENTMCNC: 31.7 G/DL (ref 32–36.5)
MCV RBC AUTO: 87.2 FL (ref 80–96)
PLATELET # BLD AUTO: 177 10^3/UL (ref 150–450)
POTASSIUM SERPL-SCNC: 4.3 MEQ/L (ref 3.5–5.1)
RBC # BLD AUTO: 5.25 10^6/UL (ref 4.3–6.1)
SODIUM SERPL-SCNC: 141 MEQ/L (ref 136–145)
WBC # BLD AUTO: 5.5 10^3/UL (ref 4–10)

## 2020-11-12 ENCOUNTER — HOSPITAL ENCOUNTER (OUTPATIENT)
Dept: HOSPITAL 53 - SSVSNF4 | Age: 80
End: 2020-11-12
Attending: INTERNAL MEDICINE

## 2020-11-12 DIAGNOSIS — Z20.828: Primary | ICD-10-CM

## 2020-11-19 ENCOUNTER — HOSPITAL ENCOUNTER (OUTPATIENT)
Dept: HOSPITAL 53 - SSVSNF4 | Age: 80
End: 2020-11-19
Attending: INTERNAL MEDICINE

## 2020-11-19 ENCOUNTER — HOSPITAL ENCOUNTER (OUTPATIENT)
Dept: HOSPITAL 53 - SSVSNF4 | Age: 80
End: 2020-11-19
Attending: INTERNAL MEDICINE
Payer: MEDICARE

## 2020-11-19 DIAGNOSIS — Z20.828: Primary | ICD-10-CM

## 2020-11-19 DIAGNOSIS — I10: Primary | ICD-10-CM

## 2020-11-19 LAB
BUN SERPL-MCNC: 18 MG/DL (ref 7–18)
CALCIUM SERPL-MCNC: 9.2 MG/DL (ref 8.8–10.2)
CHLORIDE SERPL-SCNC: 103 MEQ/L (ref 98–107)
CO2 SERPL-SCNC: 31 MEQ/L (ref 21–32)
CREAT SERPL-MCNC: 0.86 MG/DL (ref 0.7–1.3)
GFR SERPL CREATININE-BSD FRML MDRD: > 60 ML/MIN/{1.73_M2} (ref 42–?)
GLUCOSE SERPL-MCNC: 110 MG/DL (ref 70–100)
HCT VFR BLD AUTO: 50 % (ref 42–52)
HGB BLD-MCNC: 15.8 G/DL (ref 13.5–17.5)
MCH RBC QN AUTO: 27.4 PG (ref 27–33)
MCHC RBC AUTO-ENTMCNC: 31.6 G/DL (ref 32–36.5)
MCV RBC AUTO: 86.8 FL (ref 80–96)
PLATELET # BLD AUTO: 189 10^3/UL (ref 150–450)
POTASSIUM SERPL-SCNC: 4.1 MEQ/L (ref 3.5–5.1)
RBC # BLD AUTO: 5.76 10^6/UL (ref 4.3–6.1)
SODIUM SERPL-SCNC: 139 MEQ/L (ref 136–145)
WBC # BLD AUTO: 5.4 10^3/UL (ref 4–10)

## 2020-11-25 ENCOUNTER — HOSPITAL ENCOUNTER (OUTPATIENT)
Dept: HOSPITAL 53 - SSVSNF4 | Age: 80
End: 2020-11-25
Attending: INTERNAL MEDICINE
Payer: MEDICARE

## 2020-11-25 DIAGNOSIS — Z20.828: Primary | ICD-10-CM

## 2020-12-13 ENCOUNTER — HOSPITAL ENCOUNTER (OUTPATIENT)
Dept: HOSPITAL 53 - SSVSNF3 | Age: 80
End: 2020-12-13
Attending: INTERNAL MEDICINE

## 2020-12-13 DIAGNOSIS — Z20.828: Primary | ICD-10-CM

## 2020-12-16 ENCOUNTER — HOSPITAL ENCOUNTER (OUTPATIENT)
Dept: HOSPITAL 53 - SSVSNF3 | Age: 80
End: 2020-12-16
Attending: INTERNAL MEDICINE

## 2020-12-16 DIAGNOSIS — I10: Primary | ICD-10-CM

## 2020-12-16 LAB
BUN SERPL-MCNC: 25 MG/DL (ref 7–18)
CALCIUM SERPL-MCNC: 9 MG/DL (ref 8.8–10.2)
CHLORIDE SERPL-SCNC: 104 MEQ/L (ref 98–107)
CO2 SERPL-SCNC: 31 MEQ/L (ref 21–32)
CREAT SERPL-MCNC: 0.84 MG/DL (ref 0.7–1.3)
GFR SERPL CREATININE-BSD FRML MDRD: > 60 ML/MIN/{1.73_M2} (ref 42–?)
GLUCOSE SERPL-MCNC: 95 MG/DL (ref 70–100)
HCT VFR BLD AUTO: 47.4 % (ref 42–52)
HGB BLD-MCNC: 14.6 G/DL (ref 13.5–17.5)
MCH RBC QN AUTO: 26.9 PG (ref 27–33)
MCHC RBC AUTO-ENTMCNC: 30.8 G/DL (ref 32–36.5)
MCV RBC AUTO: 87.5 FL (ref 80–96)
PLATELET # BLD AUTO: 173 10^3/UL (ref 150–450)
POTASSIUM SERPL-SCNC: 3.9 MEQ/L (ref 3.5–5.1)
RBC # BLD AUTO: 5.42 10^6/UL (ref 4.3–6.1)
SODIUM SERPL-SCNC: 141 MEQ/L (ref 136–145)
WBC # BLD AUTO: 5.6 10^3/UL (ref 4–10)

## 2020-12-29 ENCOUNTER — HOSPITAL ENCOUNTER (OUTPATIENT)
Dept: HOSPITAL 53 - SSVSNF3 | Age: 80
End: 2020-12-29
Attending: INTERNAL MEDICINE

## 2020-12-29 DIAGNOSIS — Z20.828: Primary | ICD-10-CM

## 2021-01-04 ENCOUNTER — HOSPITAL ENCOUNTER (OUTPATIENT)
Dept: HOSPITAL 53 - SSVSNF3 | Age: 81
End: 2021-01-04
Attending: INTERNAL MEDICINE
Payer: MEDICARE

## 2021-01-04 DIAGNOSIS — Z20.822: Primary | ICD-10-CM

## 2021-01-08 ENCOUNTER — HOSPITAL ENCOUNTER (OUTPATIENT)
Dept: HOSPITAL 53 - SSVSNF3 | Age: 81
End: 2021-01-08
Attending: INTERNAL MEDICINE
Payer: MEDICARE

## 2021-01-08 DIAGNOSIS — Z20.822: Primary | ICD-10-CM

## 2021-01-13 ENCOUNTER — HOSPITAL ENCOUNTER (OUTPATIENT)
Dept: HOSPITAL 53 - SSVSNF3 | Age: 81
End: 2021-01-13
Attending: INTERNAL MEDICINE
Payer: MEDICARE

## 2021-01-13 DIAGNOSIS — Z20.822: Primary | ICD-10-CM

## 2021-01-20 ENCOUNTER — HOSPITAL ENCOUNTER (OUTPATIENT)
Dept: HOSPITAL 53 - SSVSNF3 | Age: 81
End: 2021-01-20
Attending: INTERNAL MEDICINE
Payer: MEDICARE

## 2021-01-20 DIAGNOSIS — Z20.822: Primary | ICD-10-CM

## 2021-01-26 ENCOUNTER — HOSPITAL ENCOUNTER (OUTPATIENT)
Dept: HOSPITAL 53 - SSVSNF3 | Age: 81
End: 2021-01-26
Attending: INTERNAL MEDICINE
Payer: MEDICARE

## 2021-01-26 DIAGNOSIS — I10: Primary | ICD-10-CM

## 2021-01-26 LAB
BUN SERPL-MCNC: 19 MG/DL (ref 7–18)
CALCIUM SERPL-MCNC: 9.4 MG/DL (ref 8.8–10.2)
CHLORIDE SERPL-SCNC: 103 MEQ/L (ref 98–107)
CO2 SERPL-SCNC: 32 MEQ/L (ref 21–32)
CREAT SERPL-MCNC: 0.81 MG/DL (ref 0.7–1.3)
GFR SERPL CREATININE-BSD FRML MDRD: > 60 ML/MIN/{1.73_M2} (ref 42–?)
GLUCOSE SERPL-MCNC: 103 MG/DL (ref 70–100)
HCT VFR BLD AUTO: 44.3 % (ref 42–52)
HGB BLD-MCNC: 14.1 G/DL (ref 13.5–17.5)
MCH RBC QN AUTO: 27.9 PG (ref 27–33)
MCHC RBC AUTO-ENTMCNC: 31.8 G/DL (ref 32–36.5)
MCV RBC AUTO: 87.7 FL (ref 80–96)
PLATELET # BLD AUTO: 177 10^3/UL (ref 150–450)
POTASSIUM SERPL-SCNC: 4.1 MEQ/L (ref 3.5–5.1)
RBC # BLD AUTO: 5.05 10^6/UL (ref 4.3–6.1)
SODIUM SERPL-SCNC: 139 MEQ/L (ref 136–145)
WBC # BLD AUTO: 6.1 10^3/UL (ref 4–10)

## 2021-01-27 ENCOUNTER — HOSPITAL ENCOUNTER (OUTPATIENT)
Dept: HOSPITAL 53 - SSVSNF3 | Age: 81
End: 2021-01-27
Attending: INTERNAL MEDICINE
Payer: MEDICARE

## 2021-01-27 DIAGNOSIS — Z20.822: Primary | ICD-10-CM

## 2021-02-03 ENCOUNTER — HOSPITAL ENCOUNTER (OUTPATIENT)
Dept: HOSPITAL 53 - SSVSNF3 | Age: 81
End: 2021-02-03
Attending: INTERNAL MEDICINE
Payer: MEDICARE

## 2021-02-03 DIAGNOSIS — Z20.822: Primary | ICD-10-CM

## 2021-02-10 ENCOUNTER — HOSPITAL ENCOUNTER (OUTPATIENT)
Dept: HOSPITAL 53 - SSVSNF3 | Age: 81
End: 2021-02-10
Attending: INTERNAL MEDICINE
Payer: MEDICARE

## 2021-02-10 DIAGNOSIS — Z20.822: Primary | ICD-10-CM

## 2021-02-17 ENCOUNTER — HOSPITAL ENCOUNTER (OUTPATIENT)
Dept: HOSPITAL 53 - SSVSNF3 | Age: 81
End: 2021-02-17
Attending: INTERNAL MEDICINE
Payer: MEDICARE

## 2021-02-17 DIAGNOSIS — Z20.822: Primary | ICD-10-CM

## 2021-02-24 ENCOUNTER — HOSPITAL ENCOUNTER (OUTPATIENT)
Dept: HOSPITAL 53 - SSVSNF4 | Age: 81
End: 2021-02-24
Attending: INTERNAL MEDICINE
Payer: MEDICARE

## 2021-02-24 DIAGNOSIS — Z20.822: Primary | ICD-10-CM

## 2021-03-03 ENCOUNTER — HOSPITAL ENCOUNTER (OUTPATIENT)
Dept: HOSPITAL 53 - SSVSNF3 | Age: 81
End: 2021-03-03
Attending: INTERNAL MEDICINE
Payer: MEDICARE

## 2021-03-03 DIAGNOSIS — Z20.822: Primary | ICD-10-CM

## 2021-03-10 ENCOUNTER — HOSPITAL ENCOUNTER (OUTPATIENT)
Dept: HOSPITAL 53 - SSVSNF3 | Age: 81
End: 2021-03-10
Attending: INTERNAL MEDICINE
Payer: MEDICARE

## 2021-03-10 DIAGNOSIS — Z11.52: Primary | ICD-10-CM

## 2021-04-13 ENCOUNTER — HOSPITAL ENCOUNTER (OUTPATIENT)
Dept: HOSPITAL 53 - SSVSNF3 | Age: 81
End: 2021-04-13
Attending: INTERNAL MEDICINE
Payer: MEDICARE

## 2021-04-13 DIAGNOSIS — Z20.822: Primary | ICD-10-CM

## 2021-04-28 ENCOUNTER — HOSPITAL ENCOUNTER (OUTPATIENT)
Dept: HOSPITAL 53 - SSVSNF3 | Age: 81
End: 2021-04-28
Attending: INTERNAL MEDICINE
Payer: MEDICARE

## 2021-04-28 DIAGNOSIS — I10: Primary | ICD-10-CM

## 2021-04-28 LAB
BUN SERPL-MCNC: 21 MG/DL (ref 7–18)
CALCIUM SERPL-MCNC: 8.7 MG/DL (ref 8.8–10.2)
CHLORIDE SERPL-SCNC: 106 MEQ/L (ref 98–107)
CO2 SERPL-SCNC: 25 MEQ/L (ref 21–32)
CREAT SERPL-MCNC: 0.81 MG/DL (ref 0.7–1.3)
GFR SERPL CREATININE-BSD FRML MDRD: > 60 ML/MIN/{1.73_M2} (ref 42–?)
GLUCOSE SERPL-MCNC: 141 MG/DL (ref 70–100)
HCT VFR BLD AUTO: 45.5 % (ref 42–52)
HGB BLD-MCNC: 14.6 G/DL (ref 13.5–17.5)
MCH RBC QN AUTO: 27.7 PG (ref 27–33)
MCHC RBC AUTO-ENTMCNC: 32.1 G/DL (ref 32–36.5)
MCV RBC AUTO: 86.3 FL (ref 80–96)
PLATELET # BLD AUTO: 167 10^3/UL (ref 150–450)
POTASSIUM SERPL-SCNC: 3.8 MEQ/L (ref 3.5–5.1)
RBC # BLD AUTO: 5.27 10^6/UL (ref 4.3–6.1)
SODIUM SERPL-SCNC: 139 MEQ/L (ref 136–145)
WBC # BLD AUTO: 4.7 10^3/UL (ref 4–10)

## 2021-07-13 ENCOUNTER — HOSPITAL ENCOUNTER (OUTPATIENT)
Dept: HOSPITAL 53 - SSVSNF3 | Age: 81
End: 2021-07-13
Attending: PHYSICIAN ASSISTANT
Payer: MEDICARE

## 2021-07-13 DIAGNOSIS — I10: Primary | ICD-10-CM

## 2021-07-13 LAB
BUN SERPL-MCNC: 17 MG/DL (ref 7–18)
CALCIUM SERPL-MCNC: 8.7 MG/DL (ref 8.8–10.2)
CHLORIDE SERPL-SCNC: 103 MEQ/L (ref 98–107)
CO2 SERPL-SCNC: 31 MEQ/L (ref 21–32)
CREAT SERPL-MCNC: 0.78 MG/DL (ref 0.7–1.3)
GFR SERPL CREATININE-BSD FRML MDRD: > 60 ML/MIN/{1.73_M2} (ref 42–?)
GLUCOSE SERPL-MCNC: 118 MG/DL (ref 70–100)
HCT VFR BLD AUTO: 47.7 % (ref 42–52)
HGB BLD-MCNC: 15.2 G/DL (ref 13.5–17.5)
MCH RBC QN AUTO: 27.9 PG (ref 27–33)
MCHC RBC AUTO-ENTMCNC: 31.9 G/DL (ref 32–36.5)
MCV RBC AUTO: 87.7 FL (ref 80–96)
PLATELET # BLD AUTO: 190 10^3/UL (ref 150–450)
POTASSIUM SERPL-SCNC: 3.8 MEQ/L (ref 3.5–5.1)
RBC # BLD AUTO: 5.44 10^6/UL (ref 4.3–6.1)
SODIUM SERPL-SCNC: 138 MEQ/L (ref 136–145)
WBC # BLD AUTO: 5.6 10^3/UL (ref 4–10)

## 2021-09-20 ENCOUNTER — HOSPITAL ENCOUNTER (OUTPATIENT)
Dept: HOSPITAL 53 - SSVSNF3 | Age: 81
End: 2021-09-20
Attending: INTERNAL MEDICINE
Payer: MEDICARE

## 2021-09-20 DIAGNOSIS — R41.82: Primary | ICD-10-CM

## 2021-09-20 LAB
BUN SERPL-MCNC: 56 MG/DL (ref 7–18)
CALCIUM SERPL-MCNC: 9.6 MG/DL (ref 8.8–10.2)
CHLORIDE SERPL-SCNC: 100 MEQ/L (ref 98–107)
CO2 SERPL-SCNC: 30 MEQ/L (ref 21–32)
CREAT SERPL-MCNC: 1.3 MG/DL (ref 0.7–1.3)
GFR SERPL CREATININE-BSD FRML MDRD: 56.5 ML/MIN/{1.73_M2} (ref 35–?)
GLUCOSE SERPL-MCNC: 161 MG/DL (ref 70–100)
HCT VFR BLD AUTO: 43.7 % (ref 42–52)
HGB BLD-MCNC: 14.1 G/DL (ref 13.5–17.5)
MCH RBC QN AUTO: 28 PG (ref 27–33)
MCHC RBC AUTO-ENTMCNC: 32.3 G/DL (ref 32–36.5)
MCV RBC AUTO: 86.9 FL (ref 80–96)
PLATELET # BLD AUTO: 202 10^3/UL (ref 150–450)
POTASSIUM SERPL-SCNC: 3.7 MEQ/L (ref 3.5–5.1)
RBC # BLD AUTO: 5.03 10^6/UL (ref 4.3–6.1)
SODIUM SERPL-SCNC: 137 MEQ/L (ref 136–145)
WBC # BLD AUTO: 10 10^3/UL (ref 4–10)

## 2021-09-21 ENCOUNTER — HOSPITAL ENCOUNTER (OUTPATIENT)
Dept: HOSPITAL 53 - M RAD | Age: 81
End: 2021-09-21
Attending: PHYSICIAN ASSISTANT
Payer: MEDICARE

## 2021-09-21 DIAGNOSIS — R41.82: Primary | ICD-10-CM

## 2021-09-21 LAB
APPEARANCE UR: (no result)
BACTERIA UR QL AUTO: NEGATIVE
BILIRUB UR QL STRIP.AUTO: NEGATIVE
GLUCOSE UR QL STRIP.AUTO: (no result) MG/DL
HGB UR QL STRIP.AUTO: NEGATIVE
KETONES UR QL STRIP.AUTO: NEGATIVE MG/DL
LEUKOCYTE ESTERASE UR QL STRIP.AUTO: NEGATIVE
MUCOUS THREADS URNS QL MICRO: (no result)
NITRITE UR QL STRIP.AUTO: NEGATIVE
PH UR STRIP.AUTO: 5 UNITS (ref 5–9)
PROT UR QL STRIP.AUTO: (no result) MG/DL
RBC # UR AUTO: 3 /HPF (ref 0–3)
SP GR UR STRIP.AUTO: 1.02 (ref 1–1.03)
SQUAMOUS #/AREA URNS AUTO: 0 /HPF (ref 0–6)
UROBILINOGEN UR QL STRIP.AUTO: 2 MG/DL (ref 0–2)
WBC #/AREA URNS AUTO: 3 /HPF (ref 0–3)

## 2021-09-21 NOTE — REP
INDICATION:

ALTERED MENTAL STATUS.



COMPARISON:

Most recent comparison study is from October 16, 2019.  July 2019 and February 15,

2017 prior studies are also reviewed.



TECHNIQUE:

Helical scanning is acquired. 5 mm axial images were reformatted.  Coronal MPR images

were generated.



FINDINGS:

Preliminary digital  radiograph demonstrates a small craniotomy defect in the

vertex.  Bone window settings demonstrates a right parietal craniotomy defect with a

shunt catheter in the right parietal lobe subarachnoid Ace..  Original CT study showed

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  There is some low-density

encephalomalacia in the right parietal lobe adjacent to the shunt which is unchanged

from the October 16, 2019 study.  Ventricular size is somewhat enlarged, concordant

with the generalized volume loss seen intracranially, and unchanged from comparison CT

studies.  There is no evidence of intracranial hemorrhage.  No extra-axial fluid

collection is appreciated.  No mass, acute infarction or midline shift is seen.  There

are fairly extensive small-vessel atherosclerotic changes in the periventricular white

matter of the supratentorial brain.  Mild vascular calcification is present at the

skull base.



The visualized paranasal sinuses are clear.  No intraorbital abnormality is seen.





IMPRESSION:

Generalized volume loss, small vessel changes, and an old area of encephalomalacia

underlying a previously placed subarachnoid CT in the right parietal lobe region.

Findings are unchanged from most recent prior study October 16, 2019. no acute

intracranial abnormality.





<Electronically signed by Joseph Rutherford > 09/21/21 1640

## 2021-10-20 ENCOUNTER — HOSPITAL ENCOUNTER (OUTPATIENT)
Dept: HOSPITAL 53 - SSVSNF3 | Age: 81
End: 2021-10-20
Attending: INTERNAL MEDICINE
Payer: MEDICARE

## 2021-10-20 DIAGNOSIS — I10: Primary | ICD-10-CM

## 2021-10-20 LAB
BUN SERPL-MCNC: 16 MG/DL (ref 7–18)
CALCIUM SERPL-MCNC: 9 MG/DL (ref 8.8–10.2)
CHLORIDE SERPL-SCNC: 106 MEQ/L (ref 98–107)
CO2 SERPL-SCNC: 31 MEQ/L (ref 21–32)
CREAT SERPL-MCNC: 0.74 MG/DL (ref 0.7–1.3)
GFR SERPL CREATININE-BSD FRML MDRD: > 60 ML/MIN/{1.73_M2} (ref 35–?)
GLUCOSE SERPL-MCNC: 98 MG/DL (ref 70–100)
HCT VFR BLD AUTO: 45.4 % (ref 42–52)
HGB BLD-MCNC: 14.5 G/DL (ref 13.5–17.5)
MCH RBC QN AUTO: 28 PG (ref 27–33)
MCHC RBC AUTO-ENTMCNC: 31.9 G/DL (ref 32–36.5)
MCV RBC AUTO: 87.8 FL (ref 80–96)
PLATELET # BLD AUTO: 180 10^3/UL (ref 150–450)
POTASSIUM SERPL-SCNC: 4.1 MEQ/L (ref 3.5–5.1)
RBC # BLD AUTO: 5.17 10^6/UL (ref 4.3–6.1)
SODIUM SERPL-SCNC: 139 MEQ/L (ref 136–145)
WBC # BLD AUTO: 5.8 10^3/UL (ref 4–10)

## 2022-01-26 ENCOUNTER — HOSPITAL ENCOUNTER (OUTPATIENT)
Dept: HOSPITAL 53 - SSVSNF3 | Age: 82
End: 2022-01-26
Attending: INTERNAL MEDICINE
Payer: MEDICARE

## 2022-01-26 DIAGNOSIS — I10: Primary | ICD-10-CM

## 2022-01-26 LAB
BUN SERPL-MCNC: 20 MG/DL (ref 7–18)
CALCIUM SERPL-MCNC: 9.1 MG/DL (ref 8.8–10.2)
CHLORIDE SERPL-SCNC: 106 MEQ/L (ref 98–107)
CO2 SERPL-SCNC: 30 MEQ/L (ref 21–32)
CREAT SERPL-MCNC: 0.74 MG/DL (ref 0.7–1.3)
GFR SERPL CREATININE-BSD FRML MDRD: > 60 ML/MIN/{1.73_M2} (ref 35–?)
GLUCOSE SERPL-MCNC: 94 MG/DL (ref 70–100)
HCT VFR BLD AUTO: 47 % (ref 42–52)
HGB BLD-MCNC: 14.9 G/DL (ref 13.5–17.5)
MCH RBC QN AUTO: 27.4 PG (ref 27–33)
MCHC RBC AUTO-ENTMCNC: 31.7 G/DL (ref 32–36.5)
MCV RBC AUTO: 86.6 FL (ref 80–96)
PLATELET # BLD AUTO: 206 10^3/UL (ref 150–450)
POTASSIUM SERPL-SCNC: 4.2 MEQ/L (ref 3.5–5.1)
RBC # BLD AUTO: 5.43 10^6/UL (ref 4.3–6.1)
SODIUM SERPL-SCNC: 140 MEQ/L (ref 136–145)
WBC # BLD AUTO: 6.8 10^3/UL (ref 4–10)

## 2022-02-08 ENCOUNTER — HOSPITAL ENCOUNTER (OUTPATIENT)
Dept: HOSPITAL 53 - SSVSNF3 | Age: 82
End: 2022-02-08
Attending: INTERNAL MEDICINE
Payer: MEDICARE

## 2022-02-08 DIAGNOSIS — R41.82: Primary | ICD-10-CM

## 2022-02-23 ENCOUNTER — HOSPITAL ENCOUNTER (OUTPATIENT)
Dept: HOSPITAL 53 - SSVSNF3 | Age: 82
End: 2022-02-23
Attending: INTERNAL MEDICINE
Payer: MEDICARE

## 2022-02-23 DIAGNOSIS — I11.9: Primary | ICD-10-CM

## 2022-02-23 LAB
ALBUMIN SERPL BCG-MCNC: 3.5 GM/DL (ref 3.2–5.2)
ALT SERPL W P-5'-P-CCNC: 29 U/L (ref 12–78)
BILIRUB SERPL-MCNC: 0.5 MG/DL (ref 0.2–1)
BUN SERPL-MCNC: 15 MG/DL (ref 7–18)
CALCIUM SERPL-MCNC: 8.8 MG/DL (ref 8.8–10.2)
CHLORIDE SERPL-SCNC: 102 MEQ/L (ref 98–107)
CO2 SERPL-SCNC: 28 MEQ/L (ref 21–32)
CREAT SERPL-MCNC: 0.76 MG/DL (ref 0.7–1.3)
GFR SERPL CREATININE-BSD FRML MDRD: > 60 ML/MIN/{1.73_M2} (ref 35–?)
GLUCOSE SERPL-MCNC: 99 MG/DL (ref 70–100)
HCT VFR BLD AUTO: 44.4 % (ref 42–52)
HGB BLD-MCNC: 14.3 G/DL (ref 13.5–17.5)
MCH RBC QN AUTO: 27.7 PG (ref 27–33)
MCHC RBC AUTO-ENTMCNC: 32.2 G/DL (ref 32–36.5)
MCV RBC AUTO: 86 FL (ref 80–96)
PLATELET # BLD AUTO: 188 10^3/UL (ref 150–450)
POTASSIUM SERPL-SCNC: 3.8 MEQ/L (ref 3.5–5.1)
PROT SERPL-MCNC: 7.3 GM/DL (ref 6.4–8.2)
RBC # BLD AUTO: 5.16 10^6/UL (ref 4.3–6.1)
SODIUM SERPL-SCNC: 138 MEQ/L (ref 136–145)
WBC # BLD AUTO: 6.4 10^3/UL (ref 4–10)

## 2022-03-21 ENCOUNTER — HOSPITAL ENCOUNTER (OUTPATIENT)
Dept: HOSPITAL 53 - SSVSNF3 | Age: 82
End: 2022-03-21
Attending: INTERNAL MEDICINE
Payer: MEDICARE

## 2022-03-21 DIAGNOSIS — N18.9: Primary | ICD-10-CM

## 2022-03-21 LAB
BUN SERPL-MCNC: 18 MG/DL (ref 7–18)
CALCIUM SERPL-MCNC: 9.3 MG/DL (ref 8.8–10.2)
CHLORIDE SERPL-SCNC: 104 MEQ/L (ref 98–107)
CO2 SERPL-SCNC: 31 MEQ/L (ref 21–32)
CREAT SERPL-MCNC: 0.6 MG/DL (ref 0.7–1.3)
GFR SERPL CREATININE-BSD FRML MDRD: > 60 ML/MIN/{1.73_M2} (ref 35–?)
GLUCOSE SERPL-MCNC: 92 MG/DL (ref 70–100)
HCT VFR BLD AUTO: 45.4 % (ref 42–52)
HGB BLD-MCNC: 14.5 G/DL (ref 13.5–17.5)
MCH RBC QN AUTO: 27.4 PG (ref 27–33)
MCHC RBC AUTO-ENTMCNC: 31.9 G/DL (ref 32–36.5)
MCV RBC AUTO: 85.8 FL (ref 80–96)
PLATELET # BLD AUTO: 199 10^3/UL (ref 150–450)
POTASSIUM SERPL-SCNC: 3.9 MEQ/L (ref 3.5–5.1)
RBC # BLD AUTO: 5.29 10^6/UL (ref 4.3–6.1)
SODIUM SERPL-SCNC: 141 MEQ/L (ref 136–145)
WBC # BLD AUTO: 7.9 10^3/UL (ref 4–10)

## 2022-09-09 ENCOUNTER — HOSPITAL ENCOUNTER (OUTPATIENT)
Dept: HOSPITAL 53 - SSVSNF3 | Age: 82
End: 2022-09-09
Attending: NURSE PRACTITIONER
Payer: MEDICARE

## 2022-09-09 ENCOUNTER — HOSPITAL ENCOUNTER (OUTPATIENT)
Dept: HOSPITAL 53 - SSVSNF3 | Age: 82
End: 2022-09-09
Attending: INTERNAL MEDICINE
Payer: MEDICARE

## 2022-09-09 DIAGNOSIS — N17.9: ICD-10-CM

## 2022-09-09 DIAGNOSIS — N17.9: Primary | ICD-10-CM

## 2022-09-09 DIAGNOSIS — R19.7: Primary | ICD-10-CM

## 2022-09-09 LAB
ALBUMIN SERPL BCG-MCNC: 4.4 GM/DL (ref 3.2–5.2)
ALT SERPL W P-5'-P-CCNC: 44 U/L (ref 12–78)
BILIRUB SERPL-MCNC: 1.2 MG/DL (ref 0.2–1)
BUN SERPL-MCNC: 43 MG/DL (ref 7–18)
CALCIUM SERPL-MCNC: 10.7 MG/DL (ref 8.8–10.2)
CHLORIDE SERPL-SCNC: 100 MEQ/L (ref 98–107)
CO2 SERPL-SCNC: 22 MEQ/L (ref 21–32)
CREAT SERPL-MCNC: 3.17 MG/DL (ref 0.7–1.3)
GFR SERPL CREATININE-BSD FRML MDRD: 20.2 ML/MIN/{1.73_M2} (ref 35–?)
GLUCOSE SERPL-MCNC: 170 MG/DL (ref 70–100)
HCT VFR BLD AUTO: 58.2 % (ref 42–52)
HGB BLD-MCNC: 18.2 G/DL (ref 13.5–17.5)
LYMPHOCYTES NFR BLD MANUAL: 16 % (ref 16–44)
MCH RBC QN AUTO: 27.5 PG (ref 27–33)
MCHC RBC AUTO-ENTMCNC: 31.3 G/DL (ref 32–36.5)
MCV RBC AUTO: 88 FL (ref 80–96)
METAMYELOCYTES NFR BLD MANUAL: 1 % (ref 0–0)
MONOCYTES NFR BLD MANUAL: 2 % (ref 0–5)
NEUTROPHILS NFR BLD MANUAL: 39 % (ref 28–66)
PHOSPHATE SERPL-MCNC: 5.2 MG/DL (ref 2.5–4.9)
PLATELET # BLD AUTO: 244 10^3/UL (ref 150–450)
PLATELET BLD QL SMEAR: NORMAL
POTASSIUM SERPL-SCNC: 3.9 MEQ/L (ref 3.5–5.1)
PROT SERPL-MCNC: 9.1 GM/DL (ref 6.4–8.2)
RBC # BLD AUTO: 6.61 10^6/UL (ref 4.3–6.1)
RBC MORPH BLD: NORMAL
SODIUM SERPL-SCNC: 139 MEQ/L (ref 136–145)
WBC # BLD AUTO: 7.5 10^3/UL (ref 4–10)

## 2022-09-12 ENCOUNTER — HOSPITAL ENCOUNTER (OUTPATIENT)
Dept: HOSPITAL 53 - SSVSNF3 | Age: 82
End: 2022-09-12
Attending: INTERNAL MEDICINE
Payer: MEDICARE

## 2022-09-12 ENCOUNTER — HOSPITAL ENCOUNTER (OUTPATIENT)
Dept: HOSPITAL 53 - SSVSNF3 | Age: 82
End: 2022-09-12
Attending: NURSE PRACTITIONER
Payer: MEDICARE

## 2022-09-12 DIAGNOSIS — R91.8: Primary | ICD-10-CM

## 2022-09-12 DIAGNOSIS — N19: Primary | ICD-10-CM

## 2022-09-12 LAB
ALBUMIN SERPL BCG-MCNC: 2.5 GM/DL (ref 3.2–5.2)
BASOPHILS # BLD AUTO: 0 10^3/UL (ref 0–0.2)
BASOPHILS NFR BLD AUTO: 0.3 % (ref 0–1)
BUN SERPL-MCNC: 85 MG/DL (ref 7–18)
CALCIUM SERPL-MCNC: 8.5 MG/DL (ref 8.8–10.2)
CHLORIDE SERPL-SCNC: 111 MEQ/L (ref 98–107)
CO2 SERPL-SCNC: 27 MEQ/L (ref 21–32)
CREAT SERPL-MCNC: 1.74 MG/DL (ref 0.7–1.3)
EOSINOPHIL # BLD AUTO: 0.1 10^3/UL (ref 0–0.5)
EOSINOPHIL NFR BLD AUTO: 1.2 % (ref 0–3)
GFR SERPL CREATININE-BSD FRML MDRD: 40.3 ML/MIN/{1.73_M2} (ref 35–?)
GLUCOSE SERPL-MCNC: 105 MG/DL (ref 70–100)
HCT VFR BLD AUTO: 42.3 % (ref 42–52)
HGB BLD-MCNC: 13.5 G/DL (ref 13.5–17.5)
LYMPHOCYTES # BLD AUTO: 1.2 10^3/UL (ref 1.5–5)
LYMPHOCYTES NFR BLD AUTO: 15.2 % (ref 24–44)
MCH RBC QN AUTO: 27.4 PG (ref 27–33)
MCHC RBC AUTO-ENTMCNC: 31.9 G/DL (ref 32–36.5)
MCV RBC AUTO: 86 FL (ref 80–96)
MONOCYTES # BLD AUTO: 0.5 10^3/UL (ref 0–0.8)
MONOCYTES NFR BLD AUTO: 6.4 % (ref 2–8)
NEUTROPHILS # BLD AUTO: 6 10^3/UL (ref 1.5–8.5)
NEUTROPHILS NFR BLD AUTO: 76.6 % (ref 36–66)
PHOSPHATE SERPL-MCNC: 2.4 MG/DL (ref 2.5–4.9)
PLATELET # BLD AUTO: 147 10^3/UL (ref 150–450)
POTASSIUM SERPL-SCNC: 4.4 MEQ/L (ref 3.5–5.1)
RBC # BLD AUTO: 4.92 10^6/UL (ref 4.3–6.1)
SODIUM SERPL-SCNC: 143 MEQ/L (ref 136–145)
WBC # BLD AUTO: 7.8 10^3/UL (ref 4–10)

## 2022-09-14 ENCOUNTER — HOSPITAL ENCOUNTER (OUTPATIENT)
Dept: HOSPITAL 53 - SSVSNF3 | Age: 82
End: 2022-09-14
Attending: INTERNAL MEDICINE
Payer: MEDICARE

## 2022-09-14 DIAGNOSIS — N17.9: Primary | ICD-10-CM

## 2022-09-14 LAB
ALBUMIN SERPL BCG-MCNC: 2.6 GM/DL (ref 3.2–5.2)
BUN SERPL-MCNC: 35 MG/DL (ref 7–18)
CALCIUM SERPL-MCNC: 8.6 MG/DL (ref 8.8–10.2)
CHLORIDE SERPL-SCNC: 110 MEQ/L (ref 98–107)
CO2 SERPL-SCNC: 29 MEQ/L (ref 21–32)
CREAT SERPL-MCNC: 0.79 MG/DL (ref 0.7–1.3)
GFR SERPL CREATININE-BSD FRML MDRD: > 60 ML/MIN/{1.73_M2} (ref 35–?)
GLUCOSE SERPL-MCNC: 135 MG/DL (ref 70–100)
PHOSPHATE SERPL-MCNC: 2.5 MG/DL (ref 2.5–4.9)
POTASSIUM SERPL-SCNC: 4.4 MEQ/L (ref 3.5–5.1)
SODIUM SERPL-SCNC: 143 MEQ/L (ref 136–145)

## 2022-09-16 ENCOUNTER — HOSPITAL ENCOUNTER (OUTPATIENT)
Dept: HOSPITAL 53 - SSVSNF3 | Age: 82
End: 2022-09-16
Attending: INTERNAL MEDICINE
Payer: MEDICARE

## 2022-09-16 DIAGNOSIS — N18.9: Primary | ICD-10-CM

## 2022-09-16 DIAGNOSIS — J18.9: ICD-10-CM

## 2022-09-16 LAB
BASOPHILS # BLD AUTO: 0.1 10^3/UL (ref 0–0.2)
BASOPHILS NFR BLD AUTO: 0.6 % (ref 0–1)
BUN SERPL-MCNC: 27 MG/DL (ref 7–18)
CALCIUM SERPL-MCNC: 8.5 MG/DL (ref 8.8–10.2)
CHLORIDE SERPL-SCNC: 105 MEQ/L (ref 98–107)
CO2 SERPL-SCNC: 26 MEQ/L (ref 21–32)
CREAT SERPL-MCNC: 0.66 MG/DL (ref 0.7–1.3)
EOSINOPHIL # BLD AUTO: 0.3 10^3/UL (ref 0–0.5)
EOSINOPHIL NFR BLD AUTO: 2.9 % (ref 0–3)
GFR SERPL CREATININE-BSD FRML MDRD: > 60 ML/MIN/{1.73_M2} (ref 35–?)
GLUCOSE SERPL-MCNC: 104 MG/DL (ref 70–100)
HCT VFR BLD AUTO: 41.8 % (ref 42–52)
HGB BLD-MCNC: 13.6 G/DL (ref 13.5–17.5)
LYMPHOCYTES # BLD AUTO: 1.9 10^3/UL (ref 1.5–5)
LYMPHOCYTES NFR BLD AUTO: 19.3 % (ref 24–44)
MCH RBC QN AUTO: 28 PG (ref 27–33)
MCHC RBC AUTO-ENTMCNC: 32.5 G/DL (ref 32–36.5)
MCV RBC AUTO: 86.2 FL (ref 80–96)
MONOCYTES # BLD AUTO: 0.8 10^3/UL (ref 0–0.8)
MONOCYTES NFR BLD AUTO: 7.6 % (ref 2–8)
NEUTROPHILS # BLD AUTO: 6.7 10^3/UL (ref 1.5–8.5)
NEUTROPHILS NFR BLD AUTO: 67.2 % (ref 36–66)
PLATELET # BLD AUTO: 222 10^3/UL (ref 150–450)
POTASSIUM SERPL-SCNC: 4.2 MEQ/L (ref 3.5–5.1)
RBC # BLD AUTO: 4.85 10^6/UL (ref 4.3–6.1)
SODIUM SERPL-SCNC: 139 MEQ/L (ref 136–145)
WBC # BLD AUTO: 10 10^3/UL (ref 4–10)

## 2022-09-21 ENCOUNTER — HOSPITAL ENCOUNTER (OUTPATIENT)
Dept: HOSPITAL 53 - SSVSNF3 | Age: 82
End: 2022-09-21
Attending: INTERNAL MEDICINE
Payer: MEDICARE

## 2022-09-21 DIAGNOSIS — N18.9: Primary | ICD-10-CM

## 2022-09-21 LAB
ALBUMIN SERPL BCG-MCNC: 3.1 GM/DL (ref 3.2–5.2)
ALT SERPL W P-5'-P-CCNC: 65 U/L (ref 12–78)
BILIRUB SERPL-MCNC: 0.5 MG/DL (ref 0.2–1)
BUN SERPL-MCNC: 18 MG/DL (ref 7–18)
CALCIUM SERPL-MCNC: 9.1 MG/DL (ref 8.8–10.2)
CHLORIDE SERPL-SCNC: 105 MEQ/L (ref 98–107)
CO2 SERPL-SCNC: 25 MEQ/L (ref 21–32)
CREAT SERPL-MCNC: 0.64 MG/DL (ref 0.7–1.3)
GFR SERPL CREATININE-BSD FRML MDRD: > 60 ML/MIN/{1.73_M2} (ref 35–?)
GLUCOSE SERPL-MCNC: 83 MG/DL (ref 70–100)
HCT VFR BLD AUTO: 44.3 % (ref 42–52)
HGB BLD-MCNC: 14.1 G/DL (ref 13.5–17.5)
MCH RBC QN AUTO: 27.2 PG (ref 27–33)
MCHC RBC AUTO-ENTMCNC: 31.8 G/DL (ref 32–36.5)
MCV RBC AUTO: 85.5 FL (ref 80–96)
PLATELET # BLD AUTO: 363 10^3/UL (ref 150–450)
POTASSIUM SERPL-SCNC: 3.6 MEQ/L (ref 3.5–5.1)
PROT SERPL-MCNC: 7.6 GM/DL (ref 6.4–8.2)
RBC # BLD AUTO: 5.18 10^6/UL (ref 4.3–6.1)
SODIUM SERPL-SCNC: 138 MEQ/L (ref 136–145)
WBC # BLD AUTO: 8.3 10^3/UL (ref 4–10)

## 2022-10-05 ENCOUNTER — HOSPITAL ENCOUNTER (OUTPATIENT)
Dept: HOSPITAL 53 - SSVSNF3 | Age: 82
End: 2022-10-05
Attending: INTERNAL MEDICINE
Payer: MEDICARE

## 2022-10-05 DIAGNOSIS — I11.9: Primary | ICD-10-CM

## 2022-10-05 LAB
BASOPHILS # BLD AUTO: 0 10^3/UL (ref 0–0.2)
BASOPHILS NFR BLD AUTO: 0.7 % (ref 0–1)
BUN SERPL-MCNC: 13 MG/DL (ref 7–18)
CALCIUM SERPL-MCNC: 9 MG/DL (ref 8.8–10.2)
CHLORIDE SERPL-SCNC: 104 MEQ/L (ref 98–107)
CO2 SERPL-SCNC: 26 MEQ/L (ref 21–32)
CREAT SERPL-MCNC: 0.72 MG/DL (ref 0.7–1.3)
EOSINOPHIL # BLD AUTO: 0.3 10^3/UL (ref 0–0.5)
EOSINOPHIL NFR BLD AUTO: 5.4 % (ref 0–3)
GFR SERPL CREATININE-BSD FRML MDRD: > 60 ML/MIN/{1.73_M2} (ref 35–?)
GLUCOSE SERPL-MCNC: 146 MG/DL (ref 70–100)
HCT VFR BLD AUTO: 41.5 % (ref 42–52)
HGB BLD-MCNC: 13 G/DL (ref 13.5–17.5)
LYMPHOCYTES # BLD AUTO: 1.8 10^3/UL (ref 1.5–5)
LYMPHOCYTES NFR BLD AUTO: 31.8 % (ref 24–44)
MCH RBC QN AUTO: 27.8 PG (ref 27–33)
MCHC RBC AUTO-ENTMCNC: 31.3 G/DL (ref 32–36.5)
MCV RBC AUTO: 88.9 FL (ref 80–96)
MONOCYTES # BLD AUTO: 0.4 10^3/UL (ref 0–0.8)
MONOCYTES NFR BLD AUTO: 7 % (ref 2–8)
NEUTROPHILS # BLD AUTO: 3 10^3/UL (ref 1.5–8.5)
NEUTROPHILS NFR BLD AUTO: 54.4 % (ref 36–66)
PLATELET # BLD AUTO: 204 10^3/UL (ref 150–450)
POTASSIUM SERPL-SCNC: 3.4 MEQ/L (ref 3.5–5.1)
RBC # BLD AUTO: 4.67 10^6/UL (ref 4.3–6.1)
SODIUM SERPL-SCNC: 138 MEQ/L (ref 136–145)
WBC # BLD AUTO: 5.6 10^3/UL (ref 4–10)

## 2022-10-06 ENCOUNTER — HOSPITAL ENCOUNTER (OUTPATIENT)
Dept: HOSPITAL 53 - SSVSNF3 | Age: 82
End: 2022-10-06
Attending: INTERNAL MEDICINE
Payer: MEDICARE

## 2022-10-06 DIAGNOSIS — E87.6: Primary | ICD-10-CM

## 2022-10-06 LAB
BUN SERPL-MCNC: 13 MG/DL (ref 7–18)
CALCIUM SERPL-MCNC: 8.8 MG/DL (ref 8.8–10.2)
CHLORIDE SERPL-SCNC: 103 MEQ/L (ref 98–107)
CO2 SERPL-SCNC: 29 MEQ/L (ref 21–32)
CREAT SERPL-MCNC: 0.68 MG/DL (ref 0.7–1.3)
GFR SERPL CREATININE-BSD FRML MDRD: > 60 ML/MIN/{1.73_M2} (ref 35–?)
GLUCOSE SERPL-MCNC: 97 MG/DL (ref 70–100)
POTASSIUM SERPL-SCNC: 4.2 MEQ/L (ref 3.5–5.1)
SODIUM SERPL-SCNC: 138 MEQ/L (ref 136–145)

## 2022-11-14 ENCOUNTER — HOSPITAL ENCOUNTER (OUTPATIENT)
Dept: HOSPITAL 53 - SSVSNF3 | Age: 82
End: 2022-11-14
Attending: INTERNAL MEDICINE
Payer: MEDICARE

## 2022-11-14 DIAGNOSIS — I10: Primary | ICD-10-CM

## 2022-11-14 LAB
BASOPHILS # BLD AUTO: 0 10^3/UL (ref 0–0.2)
BASOPHILS NFR BLD AUTO: 0.5 % (ref 0–1)
BUN SERPL-MCNC: 20 MG/DL (ref 7–18)
CALCIUM SERPL-MCNC: 8.9 MG/DL (ref 8.8–10.2)
CHLORIDE SERPL-SCNC: 104 MEQ/L (ref 98–107)
CO2 SERPL-SCNC: 27 MEQ/L (ref 21–32)
CREAT SERPL-MCNC: 0.66 MG/DL (ref 0.7–1.3)
EOSINOPHIL # BLD AUTO: 0.4 10^3/UL (ref 0–0.5)
EOSINOPHIL NFR BLD AUTO: 4.5 % (ref 0–3)
GFR SERPL CREATININE-BSD FRML MDRD: > 60 ML/MIN/{1.73_M2} (ref 35–?)
GLUCOSE SERPL-MCNC: 90 MG/DL (ref 70–100)
HCT VFR BLD AUTO: 42.7 % (ref 42–52)
HGB BLD-MCNC: 13.4 G/DL (ref 13.5–17.5)
LYMPHOCYTES # BLD AUTO: 2.1 10^3/UL (ref 1.5–5)
LYMPHOCYTES NFR BLD AUTO: 26 % (ref 24–44)
MCH RBC QN AUTO: 27.8 PG (ref 27–33)
MCHC RBC AUTO-ENTMCNC: 31.4 G/DL (ref 32–36.5)
MCV RBC AUTO: 88.6 FL (ref 80–96)
MONOCYTES # BLD AUTO: 0.5 10^3/UL (ref 0–0.8)
MONOCYTES NFR BLD AUTO: 6 % (ref 2–8)
NEUTROPHILS # BLD AUTO: 5 10^3/UL (ref 1.5–8.5)
NEUTROPHILS NFR BLD AUTO: 62.6 % (ref 36–66)
PLATELET # BLD AUTO: 258 10^3/UL (ref 150–450)
POTASSIUM SERPL-SCNC: 3.9 MEQ/L (ref 3.5–5.1)
RBC # BLD AUTO: 4.82 10^6/UL (ref 4.3–6.1)
SODIUM SERPL-SCNC: 139 MEQ/L (ref 136–145)
WBC # BLD AUTO: 8 10^3/UL (ref 4–10)

## 2022-12-19 ENCOUNTER — HOSPITAL ENCOUNTER (OUTPATIENT)
Dept: HOSPITAL 53 - SSVSNF3 | Age: 82
End: 2022-12-19
Attending: INTERNAL MEDICINE
Payer: MEDICARE

## 2022-12-19 DIAGNOSIS — N17.9: Primary | ICD-10-CM

## 2022-12-19 LAB
ALBUMIN SERPL BCG-MCNC: 3.4 G/DL (ref 3.2–5.2)
ALP SERPL-CCNC: 95 U/L (ref 46–116)
ALT SERPL W P-5'-P-CCNC: 22 U/L (ref 7–40)
AST SERPL-CCNC: 20 U/L (ref ?–34)
BILIRUB SERPL-MCNC: 0.7 MG/DL (ref 0.3–1.2)
BUN SERPL-MCNC: 17 MG/DL (ref 9–23)
CALCIUM SERPL-MCNC: 8.8 MG/DL (ref 8.3–10.6)
CHLORIDE SERPL-SCNC: 101 MMOL/L (ref 98–107)
CO2 SERPL-SCNC: 29 MMOL/L (ref 20–31)
CREAT SERPL-MCNC: 0.67 MG/DL (ref 0.7–1.3)
GFR SERPL CREATININE-BSD FRML MDRD: > 60 ML/MIN/{1.73_M2} (ref 35–?)
GLUCOSE SERPL-MCNC: 128 MG/DL (ref 74–106)
HCT VFR BLD AUTO: 44.6 % (ref 42–52)
HGB BLD-MCNC: 14 G/DL (ref 13.5–17.5)
MCH RBC QN AUTO: 27.9 PG (ref 27–33)
MCHC RBC AUTO-ENTMCNC: 31.4 G/DL (ref 32–36.5)
MCV RBC AUTO: 88.8 FL (ref 80–96)
PLATELET # BLD AUTO: 195 10^3/UL (ref 150–450)
POTASSIUM SERPL-SCNC: 3.6 MMOL/L (ref 3.5–5.1)
PROT SERPL-MCNC: 7.1 G/DL (ref 5.7–8.2)
RBC # BLD AUTO: 5.02 10^6/UL (ref 4.3–6.1)
SODIUM SERPL-SCNC: 139 MMOL/L (ref 136–145)
WBC # BLD AUTO: 5.2 10^3/UL (ref 4–10)

## 2023-01-30 ENCOUNTER — HOSPITAL ENCOUNTER (OUTPATIENT)
Dept: HOSPITAL 53 - SSVSNF3 | Age: 83
End: 2023-01-30
Attending: INTERNAL MEDICINE
Payer: MEDICARE

## 2023-01-30 DIAGNOSIS — I10: Primary | ICD-10-CM

## 2023-01-30 LAB
ALBUMIN SERPL BCG-MCNC: 3.6 G/DL (ref 3.2–5.2)
ALP SERPL-CCNC: 106 U/L (ref 46–116)
ALT SERPL W P-5'-P-CCNC: 29 U/L (ref 7–40)
AST SERPL-CCNC: 21 U/L (ref ?–34)
BASOPHILS # BLD AUTO: 0.1 10^3/UL (ref 0–0.2)
BASOPHILS NFR BLD AUTO: 0.8 % (ref 0–1)
BILIRUB SERPL-MCNC: 0.4 MG/DL (ref 0.3–1.2)
BUN SERPL-MCNC: 22 MG/DL (ref 9–23)
CALCIUM SERPL-MCNC: 9.4 MG/DL (ref 8.3–10.6)
CHLORIDE SERPL-SCNC: 105 MMOL/L (ref 98–107)
CO2 SERPL-SCNC: 28 MMOL/L (ref 20–31)
CREAT SERPL-MCNC: 0.73 MG/DL (ref 0.7–1.3)
EOSINOPHIL # BLD AUTO: 0.3 10^3/UL (ref 0–0.5)
EOSINOPHIL NFR BLD AUTO: 4.9 % (ref 0–3)
GFR SERPL CREATININE-BSD FRML MDRD: > 60 ML/MIN/{1.73_M2} (ref 35–?)
GLUCOSE SERPL-MCNC: 120 MG/DL (ref 74–106)
HCT VFR BLD AUTO: 47.8 % (ref 42–52)
HGB BLD-MCNC: 14.6 G/DL (ref 13.5–17.5)
LYMPHOCYTES # BLD AUTO: 2 10^3/UL (ref 1.5–5)
LYMPHOCYTES NFR BLD AUTO: 31.7 % (ref 24–44)
MCH RBC QN AUTO: 27.2 PG (ref 27–33)
MCHC RBC AUTO-ENTMCNC: 30.5 G/DL (ref 32–36.5)
MCV RBC AUTO: 89 FL (ref 80–96)
MONOCYTES # BLD AUTO: 0.4 10^3/UL (ref 0–0.8)
MONOCYTES NFR BLD AUTO: 7 % (ref 2–8)
NEUTROPHILS # BLD AUTO: 3.4 10^3/UL (ref 1.5–8.5)
NEUTROPHILS NFR BLD AUTO: 55.4 % (ref 36–66)
PLATELET # BLD AUTO: 191 10^3/UL (ref 150–450)
POTASSIUM SERPL-SCNC: 3.8 MMOL/L (ref 3.5–5.1)
PROT SERPL-MCNC: 7 G/DL (ref 5.7–8.2)
RBC # BLD AUTO: 5.37 10^6/UL (ref 4.3–6.1)
SODIUM SERPL-SCNC: 142 MMOL/L (ref 136–145)
WBC # BLD AUTO: 6.2 10^3/UL (ref 4–10)

## 2023-04-24 ENCOUNTER — HOSPITAL ENCOUNTER (OUTPATIENT)
Dept: HOSPITAL 53 - SSVSNF3 | Age: 83
End: 2023-04-24
Attending: INTERNAL MEDICINE
Payer: MEDICARE

## 2023-04-24 DIAGNOSIS — R05.9: Primary | ICD-10-CM

## 2023-04-25 ENCOUNTER — HOSPITAL ENCOUNTER (OUTPATIENT)
Dept: HOSPITAL 53 - SSVSNF3 | Age: 83
End: 2023-04-25
Attending: INTERNAL MEDICINE
Payer: MEDICARE

## 2023-04-25 DIAGNOSIS — R05.9: Primary | ICD-10-CM

## 2023-06-09 ENCOUNTER — HOSPITAL ENCOUNTER (OUTPATIENT)
Dept: HOSPITAL 53 - SSVSNF3 | Age: 83
End: 2023-06-09
Attending: INTERNAL MEDICINE
Payer: MEDICARE

## 2023-06-09 DIAGNOSIS — Z79.899: ICD-10-CM

## 2023-06-09 DIAGNOSIS — I10: Primary | ICD-10-CM

## 2023-06-09 LAB
25(OH)D3 SERPL-MCNC: 21.3 NG/ML (ref 20–100)
BUN SERPL-MCNC: 15 MG/DL (ref 9–23)
CALCIUM SERPL-MCNC: 8.9 MG/DL (ref 8.3–10.6)
CHLORIDE SERPL-SCNC: 104 MMOL/L (ref 98–107)
CHOLEST SERPL-MCNC: 146 MG/DL (ref ?–200)
CHOLEST/HDLC SERPL: 3.32 {RATIO} (ref ?–5)
CO2 SERPL-SCNC: 27 MMOL/L (ref 20–31)
CREAT SERPL-MCNC: 0.58 MG/DL (ref 0.7–1.3)
GFR SERPL CREATININE-BSD FRML MDRD: > 60 ML/MIN/{1.73_M2} (ref 35–?)
GLUCOSE SERPL-MCNC: 119 MG/DL (ref 74–106)
HCT VFR BLD AUTO: 37.6 % (ref 42–52)
HDLC SERPL-MCNC: 43.9 MG/DL (ref 40–?)
HGB BLD-MCNC: 12.3 G/DL (ref 13.5–17.5)
LDLC SERPL CALC-MCNC: 65.9 MG/DL (ref ?–100)
MCH RBC QN AUTO: 28.8 PG (ref 27–33)
MCHC RBC AUTO-ENTMCNC: 32.7 G/DL (ref 32–36.5)
MCV RBC AUTO: 88.1 FL (ref 80–96)
NONHDLC SERPL-MCNC: 102.1 MG/DL
PLATELET # BLD AUTO: 204 10^3/UL (ref 150–450)
POTASSIUM SERPL-SCNC: 3.8 MMOL/L (ref 3.5–5.1)
RBC # BLD AUTO: 4.27 10^6/UL (ref 4.3–6.1)
SODIUM SERPL-SCNC: 139 MMOL/L (ref 136–145)
TRIGL SERPL-MCNC: 181 MG/DL (ref ?–150)
WBC # BLD AUTO: 5.9 10^3/UL (ref 4–10)

## 2023-08-02 ENCOUNTER — HOSPITAL ENCOUNTER (OUTPATIENT)
Dept: HOSPITAL 53 - SSVSNF3 | Age: 83
End: 2023-08-02
Attending: INTERNAL MEDICINE
Payer: MEDICARE

## 2023-08-02 DIAGNOSIS — K92.1: Primary | ICD-10-CM

## 2023-08-03 ENCOUNTER — HOSPITAL ENCOUNTER (OUTPATIENT)
Dept: HOSPITAL 53 - SSVSNF3 | Age: 83
End: 2023-08-03
Attending: INTERNAL MEDICINE

## 2023-08-03 ENCOUNTER — HOSPITAL ENCOUNTER (OUTPATIENT)
Dept: HOSPITAL 53 - SSVSNF3 | Age: 83
End: 2023-08-03
Attending: INTERNAL MEDICINE
Payer: MEDICARE

## 2023-08-03 DIAGNOSIS — Z53.8: ICD-10-CM

## 2023-08-03 DIAGNOSIS — K92.1: Primary | ICD-10-CM

## 2023-08-03 LAB
BUN SERPL-MCNC: 39 MG/DL (ref 9–23)
CALCIUM SERPL-MCNC: 9.2 MG/DL (ref 8.3–10.6)
CHLORIDE SERPL-SCNC: 102 MMOL/L (ref 98–107)
CO2 SERPL-SCNC: 23 MMOL/L (ref 20–31)
CREAT SERPL-MCNC: 0.71 MG/DL (ref 0.7–1.3)
GFR SERPL CREATININE-BSD FRML MDRD: > 60 ML/MIN/{1.73_M2} (ref 35–?)
GLUCOSE SERPL-MCNC: 147 MG/DL (ref 74–106)
HCT VFR BLD AUTO: 45 % (ref 42–52)
HGB BLD-MCNC: 14.6 G/DL (ref 13.5–17.5)
MCH RBC QN AUTO: 28.6 PG (ref 27–33)
MCHC RBC AUTO-ENTMCNC: 32.4 G/DL (ref 32–36.5)
MCV RBC AUTO: 88.2 FL (ref 80–96)
PLATELET # BLD AUTO: 217 10^3/UL (ref 150–450)
POTASSIUM SERPL-SCNC: 3.6 MMOL/L (ref 3.5–5.1)
RBC # BLD AUTO: 5.1 10^6/UL (ref 4.3–6.1)
SODIUM SERPL-SCNC: 142 MMOL/L (ref 136–145)
WBC # BLD AUTO: 11.9 10^3/UL (ref 4–10)

## 2023-08-04 ENCOUNTER — HOSPITAL ENCOUNTER (OUTPATIENT)
Dept: HOSPITAL 53 - SSVSNF3 | Age: 83
End: 2023-08-04
Attending: INTERNAL MEDICINE
Payer: MEDICARE

## 2023-08-04 DIAGNOSIS — K52.9: Primary | ICD-10-CM

## 2023-08-04 LAB
BASOPHILS # BLD AUTO: 0 10^3/UL (ref 0–0.2)
BASOPHILS NFR BLD AUTO: 0.4 % (ref 0–1)
BUN SERPL-MCNC: 41 MG/DL (ref 9–23)
CALCIUM SERPL-MCNC: 8.9 MG/DL (ref 8.3–10.6)
CHLORIDE SERPL-SCNC: 108 MMOL/L (ref 98–107)
CO2 SERPL-SCNC: 26 MMOL/L (ref 20–31)
CREAT SERPL-MCNC: 0.75 MG/DL (ref 0.7–1.3)
EOSINOPHIL # BLD AUTO: 0.2 10^3/UL (ref 0–0.5)
EOSINOPHIL NFR BLD AUTO: 2 % (ref 0–3)
GFR SERPL CREATININE-BSD FRML MDRD: > 60 ML/MIN/{1.73_M2} (ref 35–?)
GLUCOSE SERPL-MCNC: 109 MG/DL (ref 74–106)
HCT VFR BLD AUTO: 43.2 % (ref 42–52)
HGB BLD-MCNC: 13.5 G/DL (ref 13.5–17.5)
LYMPHOCYTES # BLD AUTO: 2.1 10^3/UL (ref 1.5–5)
LYMPHOCYTES NFR BLD AUTO: 21.1 % (ref 24–44)
MCH RBC QN AUTO: 28.2 PG (ref 27–33)
MCHC RBC AUTO-ENTMCNC: 31.3 G/DL (ref 32–36.5)
MCV RBC AUTO: 90.2 FL (ref 80–96)
MONOCYTES # BLD AUTO: 0.9 10^3/UL (ref 0–0.8)
MONOCYTES NFR BLD AUTO: 8.5 % (ref 2–8)
NEUTROPHILS # BLD AUTO: 6.8 10^3/UL (ref 1.5–8.5)
NEUTROPHILS NFR BLD AUTO: 67.7 % (ref 36–66)
PLATELET # BLD AUTO: 148 10^3/UL (ref 150–450)
POTASSIUM SERPL-SCNC: 4.2 MMOL/L (ref 3.5–5.1)
RBC # BLD AUTO: 4.79 10^6/UL (ref 4.3–6.1)
SODIUM SERPL-SCNC: 143 MMOL/L (ref 136–145)
WBC # BLD AUTO: 10 10^3/UL (ref 4–10)

## 2023-08-07 ENCOUNTER — HOSPITAL ENCOUNTER (OUTPATIENT)
Dept: HOSPITAL 53 - SSVSNF3 | Age: 83
End: 2023-08-07
Attending: INTERNAL MEDICINE
Payer: MEDICARE

## 2023-08-07 DIAGNOSIS — K92.2: Primary | ICD-10-CM

## 2023-08-07 LAB
ALBUMIN SERPL BCG-MCNC: 3 G/DL (ref 3.2–5.2)
ALP SERPL-CCNC: 76 U/L (ref 46–116)
ALT SERPL W P-5'-P-CCNC: 17 U/L (ref 7–40)
AST SERPL-CCNC: 9 U/L (ref ?–34)
BASOPHILS # BLD AUTO: 0 10^3/UL (ref 0–0.2)
BASOPHILS NFR BLD AUTO: 0.6 % (ref 0–1)
BILIRUB SERPL-MCNC: 0.4 MG/DL (ref 0.3–1.2)
BUN SERPL-MCNC: 17 MG/DL (ref 9–23)
CALCIUM SERPL-MCNC: 8.9 MG/DL (ref 8.3–10.6)
CHLORIDE SERPL-SCNC: 106 MMOL/L (ref 98–107)
CO2 SERPL-SCNC: 27 MMOL/L (ref 20–31)
CREAT SERPL-MCNC: 0.5 MG/DL (ref 0.7–1.3)
EOSINOPHIL # BLD AUTO: 0.4 10^3/UL (ref 0–0.5)
EOSINOPHIL NFR BLD AUTO: 6.2 % (ref 0–3)
GFR SERPL CREATININE-BSD FRML MDRD: > 60 ML/MIN/{1.73_M2} (ref 35–?)
GLUCOSE SERPL-MCNC: 97 MG/DL (ref 74–106)
HCT VFR BLD AUTO: 40.6 % (ref 42–52)
HGB BLD-MCNC: 12.9 G/DL (ref 13.5–17.5)
LYMPHOCYTES # BLD AUTO: 2 10^3/UL (ref 1.5–5)
LYMPHOCYTES NFR BLD AUTO: 30.7 % (ref 24–44)
MCH RBC QN AUTO: 28.4 PG (ref 27–33)
MCHC RBC AUTO-ENTMCNC: 31.8 G/DL (ref 32–36.5)
MCV RBC AUTO: 89.4 FL (ref 80–96)
MONOCYTES # BLD AUTO: 0.5 10^3/UL (ref 0–0.8)
MONOCYTES NFR BLD AUTO: 7.3 % (ref 2–8)
NEUTROPHILS # BLD AUTO: 3.6 10^3/UL (ref 1.5–8.5)
NEUTROPHILS NFR BLD AUTO: 54.6 % (ref 36–66)
PLATELET # BLD AUTO: 178 10^3/UL (ref 150–450)
POTASSIUM SERPL-SCNC: 3.6 MMOL/L (ref 3.5–5.1)
PROT SERPL-MCNC: 6.3 G/DL (ref 5.7–8.2)
RBC # BLD AUTO: 4.54 10^6/UL (ref 4.3–6.1)
SODIUM SERPL-SCNC: 142 MMOL/L (ref 136–145)
WBC # BLD AUTO: 6.6 10^3/UL (ref 4–10)

## 2024-05-28 ENCOUNTER — HOSPITAL ENCOUNTER (OUTPATIENT)
Dept: HOSPITAL 53 - SSVSNF3 | Age: 84
End: 2024-05-28
Attending: INTERNAL MEDICINE
Payer: MEDICARE

## 2024-05-28 DIAGNOSIS — R06.2: Primary | ICD-10-CM

## 2024-05-28 DIAGNOSIS — R68.89: Primary | ICD-10-CM

## 2024-05-28 DIAGNOSIS — Z79.899: ICD-10-CM

## 2024-05-28 DIAGNOSIS — R68.89: ICD-10-CM

## 2024-05-28 LAB
APPEARANCE UR: CLEAR
BACTERIA UR QL AUTO: NEGATIVE
BASOPHILS # BLD AUTO: 0.1 10^3/UL (ref 0–0.2)
BASOPHILS NFR BLD AUTO: 0.9 % (ref 0–1)
BILIRUB UR QL STRIP.AUTO: NEGATIVE
BUN SERPL-MCNC: 19 MG/DL (ref 9–23)
CALCIUM SERPL-MCNC: 8.7 MG/DL (ref 8.3–10.6)
CHLORIDE SERPL-SCNC: 106 MMOL/L (ref 98–107)
CO2 SERPL-SCNC: 24 MMOL/L (ref 20–31)
CREAT SERPL-MCNC: 0.62 MG/DL (ref 0.7–1.3)
EOSINOPHIL # BLD AUTO: 0.4 10^3/UL (ref 0–0.5)
EOSINOPHIL NFR BLD AUTO: 6 % (ref 0–3)
GFR SERPL CREATININE-BSD FRML MDRD: > 60 ML/MIN/{1.73_M2} (ref 35–?)
GLUCOSE SERPL-MCNC: 117 MG/DL (ref 74–106)
GLUCOSE UR QL STRIP.AUTO: NEGATIVE MG/DL
HCT VFR BLD AUTO: 39.6 % (ref 42–52)
HGB BLD-MCNC: 13 G/DL (ref 13.5–17.5)
HGB UR QL STRIP.AUTO: NEGATIVE
KETONES UR QL STRIP.AUTO: NEGATIVE MG/DL
LEUKOCYTE ESTERASE UR QL STRIP.AUTO: NEGATIVE
LYMPHOCYTES # BLD AUTO: 2.2 10^3/UL (ref 1.5–5)
LYMPHOCYTES NFR BLD AUTO: 31.4 % (ref 24–44)
MCH RBC QN AUTO: 29.1 PG (ref 27–33)
MCHC RBC AUTO-ENTMCNC: 32.8 G/DL (ref 32–36.5)
MCV RBC AUTO: 88.8 FL (ref 80–96)
MONOCYTES # BLD AUTO: 0.5 10^3/UL (ref 0–0.8)
MONOCYTES NFR BLD AUTO: 7.1 % (ref 2–8)
MUCOUS THREADS URNS QL MICRO: (no result)
NEUTROPHILS # BLD AUTO: 3.8 10^3/UL (ref 1.5–8.5)
NEUTROPHILS NFR BLD AUTO: 54.2 % (ref 36–66)
NITRITE UR QL STRIP.AUTO: NEGATIVE
PH UR STRIP.AUTO: 5 UNITS (ref 5–9)
PLATELET # BLD AUTO: 225 10^3/UL (ref 150–450)
POTASSIUM SERPL-SCNC: 3.6 MMOL/L (ref 3.5–5.1)
PROT UR QL STRIP.AUTO: (no result) MG/DL
RBC # BLD AUTO: 4.46 10^6/UL (ref 4.3–6.1)
RBC # UR AUTO: 0 /HPF (ref 0–3)
SODIUM SERPL-SCNC: 139 MMOL/L (ref 136–145)
SP GR UR STRIP.AUTO: 1.01 (ref 1–1.03)
SQUAMOUS #/AREA URNS AUTO: 0 /HPF (ref 0–6)
UROBILINOGEN UR QL STRIP.AUTO: 0.2 MG/DL (ref 0–2)
WBC # BLD AUTO: 7 10^3/UL (ref 4–10)
WBC #/AREA URNS AUTO: 1 /HPF (ref 0–3)

## 2024-05-30 ENCOUNTER — HOSPITAL ENCOUNTER (OUTPATIENT)
Dept: HOSPITAL 53 - SSVSNF3 | Age: 84
End: 2024-05-30
Attending: INTERNAL MEDICINE
Payer: MEDICARE

## 2024-05-30 DIAGNOSIS — Z96.89: ICD-10-CM

## 2024-05-30 DIAGNOSIS — M19.012: ICD-10-CM

## 2024-05-30 DIAGNOSIS — M47.9: ICD-10-CM

## 2024-05-30 DIAGNOSIS — M19.011: ICD-10-CM

## 2024-05-30 DIAGNOSIS — R09.02: Primary | ICD-10-CM

## 2024-05-31 ENCOUNTER — HOSPITAL ENCOUNTER (OUTPATIENT)
Dept: HOSPITAL 53 - SSVSNF3 | Age: 84
End: 2024-05-31
Attending: INTERNAL MEDICINE
Payer: MEDICARE

## 2024-05-31 DIAGNOSIS — F03.918: Primary | ICD-10-CM

## 2024-06-19 ENCOUNTER — HOSPITAL ENCOUNTER (OUTPATIENT)
Dept: HOSPITAL 53 - SSVSNF3 | Age: 84
End: 2024-06-19
Attending: INTERNAL MEDICINE
Payer: MEDICARE

## 2024-06-19 DIAGNOSIS — Z79.899: ICD-10-CM

## 2024-06-19 DIAGNOSIS — I10: Primary | ICD-10-CM

## 2024-06-19 LAB
25(OH)D3 SERPL-MCNC: 30.3 NG/ML (ref 20–100)
ALBUMIN SERPL BCG-MCNC: 3.6 G/DL (ref 3.2–5.2)
ALP SERPL-CCNC: 98 U/L (ref 46–116)
ALT SERPL W P-5'-P-CCNC: 15 U/L (ref 7–40)
AST SERPL-CCNC: 8 U/L (ref ?–34)
BILIRUB SERPL-MCNC: 0.6 MG/DL (ref 0.3–1.2)
BUN SERPL-MCNC: 14 MG/DL (ref 9–23)
CALCIUM SERPL-MCNC: 9.5 MG/DL (ref 8.3–10.6)
CHLORIDE SERPL-SCNC: 105 MMOL/L (ref 98–107)
CHOLEST SERPL-MCNC: 258 MG/DL (ref ?–200)
CHOLEST/HDLC SERPL: 5.75 {RATIO} (ref ?–5)
CO2 SERPL-SCNC: 27 MMOL/L (ref 20–31)
CREAT SERPL-MCNC: 0.56 MG/DL (ref 0.7–1.3)
GFR SERPL CREATININE-BSD FRML MDRD: > 60 ML/MIN/{1.73_M2} (ref 35–?)
GLUCOSE SERPL-MCNC: 93 MG/DL (ref 74–106)
HCT VFR BLD AUTO: 41.7 % (ref 42–52)
HDLC SERPL-MCNC: 44.8 MG/DL (ref 40–?)
HGB BLD-MCNC: 13.5 G/DL (ref 13.5–17.5)
MCH RBC QN AUTO: 28.8 PG (ref 27–33)
MCHC RBC AUTO-ENTMCNC: 32.4 G/DL (ref 32–36.5)
MCV RBC AUTO: 89.1 FL (ref 80–96)
NONHDLC SERPL-MCNC: 213.2 MG/DL
PLATELET # BLD AUTO: 199 10^3/UL (ref 150–450)
POTASSIUM SERPL-SCNC: 3.7 MMOL/L (ref 3.5–5.1)
PROT SERPL-MCNC: 7.4 G/DL (ref 5.7–8.2)
RBC # BLD AUTO: 4.68 10^6/UL (ref 4.3–6.1)
SODIUM SERPL-SCNC: 141 MMOL/L (ref 136–145)
TRIGL SERPL-MCNC: 629 MG/DL (ref ?–150)
WBC # BLD AUTO: 6.3 10^3/UL (ref 4–10)

## 2024-12-30 ENCOUNTER — HOSPITAL ENCOUNTER (OUTPATIENT)
Dept: HOSPITAL 53 - SSVSNF3 | Age: 84
End: 2024-12-30
Attending: INTERNAL MEDICINE
Payer: MEDICARE

## 2024-12-30 DIAGNOSIS — I10: Primary | ICD-10-CM

## 2024-12-30 LAB
ALBUMIN SERPL BCG-MCNC: 3.4 G/DL (ref 3.2–5.2)
ALP SERPL-CCNC: 91 U/L (ref 40–129)
ALT SERPL W P-5'-P-CCNC: 16 U/L (ref 7–40)
AST SERPL-CCNC: 12 U/L (ref ?–34)
BILIRUB SERPL-MCNC: 0.6 MG/DL (ref 0.3–1.2)
BUN SERPL-MCNC: 15 MG/DL (ref 9–23)
CALCIUM SERPL-MCNC: 9.1 MG/DL (ref 8.3–10.6)
CHLORIDE SERPL-SCNC: 103 MMOL/L (ref 98–107)
CO2 SERPL-SCNC: 25 MMOL/L (ref 20–31)
CREAT SERPL-MCNC: 0.57 MG/DL (ref 0.7–1.3)
GFR SERPL CREATININE-BSD FRML MDRD: > 60 ML/MIN/{1.73_M2} (ref 35–?)
GLUCOSE SERPL-MCNC: 160 MG/DL (ref 74–106)
HCT VFR BLD AUTO: 43.8 % (ref 42–52)
HGB BLD-MCNC: 13.8 G/DL (ref 13.5–17.5)
MCH RBC QN AUTO: 27.4 PG (ref 27–33)
MCHC RBC AUTO-ENTMCNC: 31.5 G/DL (ref 32–36.5)
MCV RBC AUTO: 87.1 FL (ref 80–96)
PLATELET # BLD AUTO: 204 10^3/UL (ref 150–450)
POTASSIUM SERPL-SCNC: 3.3 MMOL/L (ref 3.5–5.1)
PROT SERPL-MCNC: 7 G/DL (ref 5.7–8.2)
RBC # BLD AUTO: 5.03 10^6/UL (ref 4.3–6.1)
SODIUM SERPL-SCNC: 141 MMOL/L (ref 136–145)
WBC # BLD AUTO: 6.5 10^3/UL (ref 4–10)

## 2025-01-20 ENCOUNTER — HOSPITAL ENCOUNTER (OUTPATIENT)
Dept: HOSPITAL 53 - SSVSNF2 | Age: 85
End: 2025-01-20
Attending: PHYSICIAN ASSISTANT
Payer: MEDICARE

## 2025-01-20 ENCOUNTER — HOSPITAL ENCOUNTER (OUTPATIENT)
Dept: HOSPITAL 53 - SSVSNF3 | Age: 85
End: 2025-01-20
Attending: INTERNAL MEDICINE
Payer: MEDICARE

## 2025-01-20 DIAGNOSIS — R91.8: Primary | ICD-10-CM

## 2025-01-20 DIAGNOSIS — R09.02: ICD-10-CM

## 2025-01-20 DIAGNOSIS — J96.90: Primary | ICD-10-CM

## 2025-01-20 LAB
BUN SERPL-MCNC: 15 MG/DL (ref 9–23)
CALCIUM SERPL-MCNC: 8.6 MG/DL (ref 8.3–10.6)
CHLORIDE SERPL-SCNC: 104 MMOL/L (ref 98–107)
CO2 SERPL-SCNC: 28 MMOL/L (ref 20–31)
CREAT SERPL-MCNC: 0.58 MG/DL (ref 0.7–1.3)
GFR SERPL CREATININE-BSD FRML MDRD: > 60 ML/MIN/{1.73_M2} (ref 35–?)
GLUCOSE SERPL-MCNC: 109 MG/DL (ref 74–106)
HCT VFR BLD AUTO: 42.7 % (ref 42–52)
HGB BLD-MCNC: 13.8 G/DL (ref 13.5–17.5)
MCH RBC QN AUTO: 27.5 PG (ref 27–33)
MCHC RBC AUTO-ENTMCNC: 32.3 G/DL (ref 32–36.5)
MCV RBC AUTO: 85.2 FL (ref 80–96)
PLATELET # BLD AUTO: 203 10^3/UL (ref 150–450)
POTASSIUM SERPL-SCNC: 3.6 MMOL/L (ref 3.5–5.1)
RBC # BLD AUTO: 5.01 10^6/UL (ref 4.3–6.1)
SODIUM SERPL-SCNC: 141 MMOL/L (ref 136–145)
WBC # BLD AUTO: 8.8 10^3/UL (ref 4–10)

## 2025-03-21 ENCOUNTER — HOSPITAL ENCOUNTER (OUTPATIENT)
Dept: HOSPITAL 53 - SSVSNF3 | Age: 85
End: 2025-03-21
Attending: PHYSICIAN ASSISTANT
Payer: MEDICARE

## 2025-03-21 ENCOUNTER — HOSPITAL ENCOUNTER (OUTPATIENT)
Dept: HOSPITAL 53 - SSVSNF3 | Age: 85
End: 2025-03-21
Attending: INTERNAL MEDICINE
Payer: MEDICARE

## 2025-03-21 DIAGNOSIS — R91.8: ICD-10-CM

## 2025-03-21 DIAGNOSIS — R05.9: Primary | ICD-10-CM
